# Patient Record
Sex: MALE | Race: WHITE | Employment: OTHER | URBAN - METROPOLITAN AREA
[De-identification: names, ages, dates, MRNs, and addresses within clinical notes are randomized per-mention and may not be internally consistent; named-entity substitution may affect disease eponyms.]

---

## 2018-11-14 ENCOUNTER — IP HISTORICAL/CONVERTED ENCOUNTER (OUTPATIENT)
Dept: OTHER | Age: 73
End: 2018-11-14

## 2020-08-19 ENCOUNTER — OP HISTORICAL/CONVERTED ENCOUNTER (OUTPATIENT)
Dept: OTHER | Age: 75
End: 2020-08-19

## 2020-10-06 ENCOUNTER — HOSPITAL ENCOUNTER (OUTPATIENT)
Dept: PREADMISSION TESTING | Age: 75
Discharge: HOME | End: 2020-10-06
Payer: MEDICARE

## 2020-10-06 ENCOUNTER — HOSPITAL ENCOUNTER (OUTPATIENT)
Dept: GENERAL RADIOLOGY | Age: 75
Discharge: HOME | End: 2020-10-06
Attending: ORTHOPAEDIC SURGERY
Payer: MEDICARE

## 2020-10-06 VITALS
HEART RATE: 78 BPM | BODY MASS INDEX: 39.71 KG/M2 | SYSTOLIC BLOOD PRESSURE: 182 MMHG | HEIGHT: 70 IN | DIASTOLIC BLOOD PRESSURE: 79 MMHG | WEIGHT: 277.4 LBS | OXYGEN SATURATION: 97 % | TEMPERATURE: 97.8 F | RESPIRATION RATE: 18 BRPM

## 2020-10-06 LAB
ABO + RH BLD: NORMAL
ANION GAP SERPL CALC-SCNC: 6 MMOL/L (ref 5–15)
APPEARANCE UR: CLEAR
APTT PPP: 29.5 SEC (ref 23–35.7)
BACTERIA SPEC CULT: NORMAL
BILIRUB UR QL: NEGATIVE
BLOOD GROUP ANTIBODIES SERPL: NEGATIVE
BUN SERPL-MCNC: 26 MG/DL (ref 6–20)
BUN/CREAT SERPL: 18 (ref 12–20)
CA-I BLD-MCNC: 9.1 MG/DL (ref 8.5–10.1)
CHLORIDE SERPL-SCNC: 108 MMOL/L (ref 97–108)
CO2 SERPL-SCNC: 28 MMOL/L (ref 21–32)
COLOR UR: NORMAL
CREAT SERPL-MCNC: 1.47 MG/DL (ref 0.7–1.3)
ERYTHROCYTE [DISTWIDTH] IN BLOOD BY AUTOMATED COUNT: 14 % (ref 11.5–14.5)
GLUCOSE SERPL-MCNC: 128 MG/DL (ref 65–100)
GLUCOSE UR STRIP.AUTO-MCNC: NEGATIVE MG/DL
HCT VFR BLD AUTO: 51.1 % (ref 36.6–50.3)
HGB BLD-MCNC: 17.8 G/DL (ref 12.1–17)
HGB UR QL STRIP: NEGATIVE
INR PPP: 1 (ref 0.9–1.1)
KETONES UR QL STRIP.AUTO: NEGATIVE MG/DL
LEUKOCYTE ESTERASE UR QL STRIP.AUTO: NEGATIVE
MCH RBC QN AUTO: 32 PG (ref 26–34)
MCHC RBC AUTO-ENTMCNC: 34.8 G/DL (ref 30–36.5)
MCV RBC AUTO: 91.9 FL (ref 80–99)
NITRITE UR QL STRIP.AUTO: NEGATIVE
PH UR STRIP: 5 [PH] (ref 5–8)
PLATELET # BLD AUTO: 181 K/UL (ref 150–400)
PMV BLD AUTO: 11.8 FL (ref 8.9–12.9)
POTASSIUM SERPL-SCNC: 3.4 MMOL/L (ref 3.5–5.1)
PROT UR STRIP-MCNC: NEGATIVE MG/DL
PROTHROMBIN TIME: 13.7 SEC (ref 11.9–14.7)
RBC # BLD AUTO: 5.56 M/UL (ref 4.1–5.7)
SODIUM SERPL-SCNC: 142 MMOL/L (ref 136–145)
SP GR UR REFRACTOMETRY: 1.02 (ref 1–1.03)
SPECIAL REQUESTS,SREQ: NORMAL
SPECIMEN EXP DATE BLD: NORMAL
THERAPEUTIC RANGE,PTTT: NORMAL SEC (ref 68–109)
UROBILINOGEN UR QL STRIP.AUTO: 0.1 EU/DL (ref 0.1–1)
WBC # BLD AUTO: 9.9 K/UL (ref 4.1–11.1)

## 2020-10-06 PROCEDURE — 87086 URINE CULTURE/COLONY COUNT: CPT

## 2020-10-06 PROCEDURE — 85610 PROTHROMBIN TIME: CPT

## 2020-10-06 PROCEDURE — 80048 BASIC METABOLIC PNL TOTAL CA: CPT

## 2020-10-06 PROCEDURE — 81003 URINALYSIS AUTO W/O SCOPE: CPT

## 2020-10-06 PROCEDURE — 71046 X-RAY EXAM CHEST 2 VIEWS: CPT

## 2020-10-06 PROCEDURE — 36415 COLL VENOUS BLD VENIPUNCTURE: CPT

## 2020-10-06 PROCEDURE — 85730 THROMBOPLASTIN TIME PARTIAL: CPT

## 2020-10-06 PROCEDURE — 86900 BLOOD TYPING SEROLOGIC ABO: CPT

## 2020-10-06 PROCEDURE — 85027 COMPLETE CBC AUTOMATED: CPT

## 2020-10-06 RX ORDER — PANTOPRAZOLE SODIUM 40 MG/1
40 TABLET, DELAYED RELEASE ORAL DAILY
COMMUNITY

## 2020-10-06 RX ORDER — ASPIRIN 81 MG/1
81 TABLET ORAL DAILY
COMMUNITY

## 2020-10-06 NOTE — PROGRESS NOTES
Pt states his wife recently, within the last 5 days, tested positive for [place]. As soon as they found out the Pt went to stay with his son, and had a negative COVID test on 10/4/20. He is to be swabbed again on 10/9/20. Dr. Prasanna Dominguez is aware of the situation and wants to proceed with surgery. The Pt was instructed that his wife may not come to the hospital at any point during his stay to visit him due to her recent illness. The Pt confirms understanding. Dr. Prasanna Dominguez is going to postpone the surgery by one week to allow the Pt to pass the 14 day incubation period. Dr. Yanira Adamson office is to follow up and reschedule with the Pt.

## 2020-10-07 LAB
BACTERIA SPEC CULT: NORMAL
SPECIAL REQUESTS,SREQ: NORMAL

## 2020-10-07 NOTE — PROGRESS NOTES
Dr. Judithann Spatz aware of abnormal labs as follows- BUN 26, Crt 1.47, GFR 47. No new orders at this time. Dr. Judithann Spatz advised to have Pt call his PCP to check in with them. Dr. Judithann Spatz states he does NOT need medical clearance. Pt advised and verbalizes understanding to call PCP.

## 2020-10-09 ENCOUNTER — HOSPITAL ENCOUNTER (OUTPATIENT)
Dept: PREADMISSION TESTING | Age: 75
Discharge: HOME | End: 2020-10-09

## 2020-10-23 ENCOUNTER — HOSPITAL ENCOUNTER (OUTPATIENT)
Dept: PREADMISSION TESTING | Age: 75
Discharge: HOME | End: 2020-10-23
Payer: MEDICARE

## 2020-10-23 LAB — SARS-COV-2, COV2: NORMAL

## 2020-10-23 PROCEDURE — 87635 SARS-COV-2 COVID-19 AMP PRB: CPT

## 2020-10-25 LAB — SARS-COV-2, COV2NT: NOT DETECTED

## 2020-10-26 RX ORDER — CEFAZOLIN SODIUM 2 G/100ML
2 INJECTION, SOLUTION INTRAVENOUS ONCE
Status: CANCELLED | OUTPATIENT
Start: 2020-10-27 | End: 2020-10-27

## 2020-10-27 ENCOUNTER — HOSPITAL ENCOUNTER (INPATIENT)
Age: 75
LOS: 5 days | Discharge: HOME HEALTH | DRG: 519 | End: 2020-11-01
Attending: ORTHOPAEDIC SURGERY | Admitting: ORTHOPAEDIC SURGERY
Payer: MEDICARE

## 2020-10-27 ENCOUNTER — APPOINTMENT (OUTPATIENT)
Dept: GENERAL RADIOLOGY | Age: 75
DRG: 519 | End: 2020-10-27
Attending: ORTHOPAEDIC SURGERY
Payer: MEDICARE

## 2020-10-27 ENCOUNTER — ANESTHESIA EVENT (OUTPATIENT)
Dept: SURGERY | Age: 75
DRG: 519 | End: 2020-10-27
Payer: MEDICARE

## 2020-10-27 ENCOUNTER — ANESTHESIA (OUTPATIENT)
Dept: SURGERY | Age: 75
DRG: 519 | End: 2020-10-27
Payer: MEDICARE

## 2020-10-27 PROBLEM — M48.062 LUMBAR STENOSIS WITH NEUROGENIC CLAUDICATION: Status: ACTIVE | Noted: 2020-10-27

## 2020-10-27 LAB
ANION GAP SERPL CALC-SCNC: 3 MMOL/L (ref 5–15)
BASOPHILS # BLD: 0 K/UL (ref 0–0.1)
BASOPHILS NFR BLD: 0 % (ref 0–1)
BUN SERPL-MCNC: 22 MG/DL (ref 6–20)
BUN/CREAT SERPL: 15 (ref 12–20)
CA-I BLD-MCNC: 8.9 MG/DL (ref 8.5–10.1)
CHLORIDE SERPL-SCNC: 107 MMOL/L (ref 97–108)
CO2 SERPL-SCNC: 30 MMOL/L (ref 21–32)
CREAT SERPL-MCNC: 1.45 MG/DL (ref 0.7–1.3)
DIFFERENTIAL METHOD BLD: ABNORMAL
EOSINOPHIL # BLD: 0 K/UL (ref 0–0.4)
EOSINOPHIL NFR BLD: 0 % (ref 0–7)
ERYTHROCYTE [DISTWIDTH] IN BLOOD BY AUTOMATED COUNT: 14 % (ref 11.5–14.5)
GLUCOSE BLD STRIP.AUTO-MCNC: 116 MG/DL (ref 65–100)
GLUCOSE BLD STRIP.AUTO-MCNC: 148 MG/DL (ref 65–100)
GLUCOSE SERPL-MCNC: 147 MG/DL (ref 65–100)
HCT VFR BLD AUTO: 48.6 % (ref 36.6–50.3)
HGB BLD-MCNC: 16.8 G/DL (ref 12.1–17)
IMM GRANULOCYTES # BLD AUTO: 0 K/UL (ref 0–0.04)
IMM GRANULOCYTES NFR BLD AUTO: 0 % (ref 0–0.5)
LYMPHOCYTES # BLD: 3.9 K/UL (ref 0.8–3.5)
LYMPHOCYTES NFR BLD: 26 % (ref 12–49)
MCH RBC QN AUTO: 32.2 PG (ref 26–34)
MCHC RBC AUTO-ENTMCNC: 34.6 G/DL (ref 30–36.5)
MCV RBC AUTO: 93.1 FL (ref 80–99)
MONOCYTES # BLD: 0.6 K/UL (ref 0–1)
MONOCYTES NFR BLD: 4 % (ref 5–13)
NEUTS SEG # BLD: 10.4 K/UL (ref 1.8–8)
NEUTS SEG NFR BLD: 70 % (ref 32–75)
PERFORMED BY, TECHID: ABNORMAL
PERFORMED BY, TECHID: ABNORMAL
PLATELET # BLD AUTO: 170 K/UL (ref 150–400)
PMV BLD AUTO: 11.2 FL (ref 8.9–12.9)
POTASSIUM SERPL-SCNC: 3.7 MMOL/L (ref 3.5–5.1)
POTASSIUM SERPL-SCNC: 3.9 MMOL/L (ref 3.5–5.1)
RBC # BLD AUTO: 5.22 M/UL (ref 4.1–5.7)
SODIUM SERPL-SCNC: 140 MMOL/L (ref 136–145)
WBC # BLD AUTO: 14.8 K/UL (ref 4.1–11.1)

## 2020-10-27 PROCEDURE — 85025 COMPLETE CBC W/AUTO DIFF WBC: CPT

## 2020-10-27 PROCEDURE — 36415 COLL VENOUS BLD VENIPUNCTURE: CPT

## 2020-10-27 PROCEDURE — 76010000173 HC OR TIME 3 TO 3.5 HR INTENSV-TIER 1: Performed by: ORTHOPAEDIC SURGERY

## 2020-10-27 PROCEDURE — 74011000250 HC RX REV CODE- 250: Performed by: NURSE ANESTHETIST, CERTIFIED REGISTERED

## 2020-10-27 PROCEDURE — 77030002986 HC SUT PROL J&J -A: Performed by: ORTHOPAEDIC SURGERY

## 2020-10-27 PROCEDURE — 77030013951 HC SEAL TISS ADH DURASL KT INLC -G1: Performed by: ORTHOPAEDIC SURGERY

## 2020-10-27 PROCEDURE — 76210000006 HC OR PH I REC 0.5 TO 1 HR: Performed by: ORTHOPAEDIC SURGERY

## 2020-10-27 PROCEDURE — 87086 URINE CULTURE/COLONY COUNT: CPT

## 2020-10-27 PROCEDURE — 2709999900 HC NON-CHARGEABLE SUPPLY: Performed by: ORTHOPAEDIC SURGERY

## 2020-10-27 PROCEDURE — 77030018673: Performed by: ORTHOPAEDIC SURGERY

## 2020-10-27 PROCEDURE — 77030012035 HC APPL SEAL MICROMYST KT INLC -C: Performed by: ORTHOPAEDIC SURGERY

## 2020-10-27 PROCEDURE — 00NY0ZZ RELEASE LUMBAR SPINAL CORD, OPEN APPROACH: ICD-10-PCS | Performed by: ORTHOPAEDIC SURGERY

## 2020-10-27 PROCEDURE — 74011250636 HC RX REV CODE- 250/636

## 2020-10-27 PROCEDURE — 77030039464 HC TU IRR ENDO DISP MSNX -B: Performed by: ORTHOPAEDIC SURGERY

## 2020-10-27 PROCEDURE — 74011000258 HC RX REV CODE- 258: Performed by: ORTHOPAEDIC SURGERY

## 2020-10-27 PROCEDURE — 00QT0ZZ REPAIR SPINAL MENINGES, OPEN APPROACH: ICD-10-PCS | Performed by: ORTHOPAEDIC SURGERY

## 2020-10-27 PROCEDURE — 77030041703 HC SLV COMPR DVT ARJO -B: Performed by: ORTHOPAEDIC SURGERY

## 2020-10-27 PROCEDURE — 74011250636 HC RX REV CODE- 250/636: Performed by: NURSE ANESTHETIST, CERTIFIED REGISTERED

## 2020-10-27 PROCEDURE — 74011250636 HC RX REV CODE- 250/636: Performed by: ORTHOPAEDIC SURGERY

## 2020-10-27 PROCEDURE — 77030005515 HC CATH URETH FOL14 BARD -B: Performed by: ORTHOPAEDIC SURGERY

## 2020-10-27 PROCEDURE — 01NB0ZZ RELEASE LUMBAR NERVE, OPEN APPROACH: ICD-10-PCS | Performed by: ORTHOPAEDIC SURGERY

## 2020-10-27 PROCEDURE — 77030039461 HC BLD SURG BN MSNX -E: Performed by: ORTHOPAEDIC SURGERY

## 2020-10-27 PROCEDURE — 77030002933 HC SUT MCRYL J&J -A: Performed by: ORTHOPAEDIC SURGERY

## 2020-10-27 PROCEDURE — 0QB00ZZ EXCISION OF LUMBAR VERTEBRA, OPEN APPROACH: ICD-10-PCS | Performed by: ORTHOPAEDIC SURGERY

## 2020-10-27 PROCEDURE — 77030011265 HC ELECTRD BLD HEX COVD -A: Performed by: ORTHOPAEDIC SURGERY

## 2020-10-27 PROCEDURE — 77030003161 HC GRFT DURA MTRX INLC -E: Performed by: ORTHOPAEDIC SURGERY

## 2020-10-27 PROCEDURE — 77030028271 HC SRGFL HEMSTAT MTRX KT J&J -C: Performed by: ORTHOPAEDIC SURGERY

## 2020-10-27 PROCEDURE — 82962 GLUCOSE BLOOD TEST: CPT

## 2020-10-27 PROCEDURE — 84132 ASSAY OF SERUM POTASSIUM: CPT

## 2020-10-27 PROCEDURE — 76000 FLUOROSCOPY <1 HR PHYS/QHP: CPT

## 2020-10-27 PROCEDURE — 76060000037 HC ANESTHESIA 3 TO 3.5 HR: Performed by: ORTHOPAEDIC SURGERY

## 2020-10-27 PROCEDURE — 74011000250 HC RX REV CODE- 250: Performed by: ORTHOPAEDIC SURGERY

## 2020-10-27 PROCEDURE — 77030038156 HC CRD BPLR DISP CARF -A: Performed by: ORTHOPAEDIC SURGERY

## 2020-10-27 PROCEDURE — 65270000029 HC RM PRIVATE

## 2020-10-27 PROCEDURE — 74011250637 HC RX REV CODE- 250/637: Performed by: ORTHOPAEDIC SURGERY

## 2020-10-27 PROCEDURE — 77030031139 HC SUT VCRL2 J&J -A: Performed by: ORTHOPAEDIC SURGERY

## 2020-10-27 PROCEDURE — 77030011264 HC ELECTRD BLD EXT COVD -A: Performed by: ORTHOPAEDIC SURGERY

## 2020-10-27 DEVICE — DURAGEN® SUTURABLE DURAL REGENERATION MATRIX, 2 IN X 2 IN (5 CM X 5 CM)
Type: IMPLANTABLE DEVICE | Site: SPINE LUMBAR | Status: FUNCTIONAL
Brand: DURAGEN® SUTURABLE

## 2020-10-27 RX ORDER — PROPOFOL 10 MG/ML
INJECTION, EMULSION INTRAVENOUS AS NEEDED
Status: DISCONTINUED | OUTPATIENT
Start: 2020-10-27 | End: 2020-10-27 | Stop reason: HOSPADM

## 2020-10-27 RX ORDER — ATORVASTATIN CALCIUM 20 MG/1
20 TABLET, FILM COATED ORAL
Status: DISCONTINUED | OUTPATIENT
Start: 2020-10-27 | End: 2020-11-01 | Stop reason: HOSPADM

## 2020-10-27 RX ORDER — HYDROCODONE BITARTRATE AND ACETAMINOPHEN 5; 325 MG/1; MG/1
1 TABLET ORAL
Status: DISCONTINUED | OUTPATIENT
Start: 2020-10-27 | End: 2020-10-28

## 2020-10-27 RX ORDER — AMLODIPINE BESYLATE 5 MG/1
5 TABLET ORAL DAILY
Status: DISCONTINUED | OUTPATIENT
Start: 2020-10-28 | End: 2020-10-30

## 2020-10-27 RX ORDER — LIDOCAINE HYDROCHLORIDE 20 MG/ML
INJECTION, SOLUTION EPIDURAL; INFILTRATION; INTRACAUDAL; PERINEURAL AS NEEDED
Status: DISCONTINUED | OUTPATIENT
Start: 2020-10-27 | End: 2020-10-27 | Stop reason: HOSPADM

## 2020-10-27 RX ORDER — NALOXONE HYDROCHLORIDE 0.4 MG/ML
0.4 INJECTION, SOLUTION INTRAMUSCULAR; INTRAVENOUS; SUBCUTANEOUS AS NEEDED
Status: DISCONTINUED | OUTPATIENT
Start: 2020-10-27 | End: 2020-11-01 | Stop reason: HOSPADM

## 2020-10-27 RX ORDER — AMOXICILLIN 250 MG
1 CAPSULE ORAL 2 TIMES DAILY
Status: DISCONTINUED | OUTPATIENT
Start: 2020-10-27 | End: 2020-11-01 | Stop reason: HOSPADM

## 2020-10-27 RX ORDER — HYDROCHLOROTHIAZIDE 25 MG/1
12.5 TABLET ORAL DAILY
Status: DISCONTINUED | OUTPATIENT
Start: 2020-10-28 | End: 2020-11-01 | Stop reason: HOSPADM

## 2020-10-27 RX ORDER — ONDANSETRON 2 MG/ML
4 INJECTION INTRAMUSCULAR; INTRAVENOUS
Status: DISPENSED | OUTPATIENT
Start: 2020-10-27 | End: 2020-10-28

## 2020-10-27 RX ORDER — FENTANYL CITRATE 50 UG/ML
INJECTION, SOLUTION INTRAMUSCULAR; INTRAVENOUS AS NEEDED
Status: DISCONTINUED | OUTPATIENT
Start: 2020-10-27 | End: 2020-10-27 | Stop reason: HOSPADM

## 2020-10-27 RX ORDER — POLYETHYLENE GLYCOL 3350 17 G/17G
17 POWDER, FOR SOLUTION ORAL DAILY
Status: DISCONTINUED | OUTPATIENT
Start: 2020-10-28 | End: 2020-11-01 | Stop reason: HOSPADM

## 2020-10-27 RX ORDER — OXYCODONE AND ACETAMINOPHEN 5; 325 MG/1; MG/1
2 TABLET ORAL AS NEEDED
Status: DISCONTINUED | OUTPATIENT
Start: 2020-10-27 | End: 2020-10-27 | Stop reason: HOSPADM

## 2020-10-27 RX ORDER — GABAPENTIN 300 MG/1
300 CAPSULE ORAL 2 TIMES DAILY
Status: DISCONTINUED | OUTPATIENT
Start: 2020-10-27 | End: 2020-11-01 | Stop reason: HOSPADM

## 2020-10-27 RX ORDER — SODIUM CHLORIDE 0.9 % (FLUSH) 0.9 %
5-40 SYRINGE (ML) INJECTION AS NEEDED
Status: DISCONTINUED | OUTPATIENT
Start: 2020-10-27 | End: 2020-10-27 | Stop reason: HOSPADM

## 2020-10-27 RX ORDER — MIDAZOLAM HYDROCHLORIDE 1 MG/ML
INJECTION, SOLUTION INTRAMUSCULAR; INTRAVENOUS AS NEEDED
Status: DISCONTINUED | OUTPATIENT
Start: 2020-10-27 | End: 2020-10-27 | Stop reason: HOSPADM

## 2020-10-27 RX ORDER — SODIUM CHLORIDE, SODIUM LACTATE, POTASSIUM CHLORIDE, CALCIUM CHLORIDE 600; 310; 30; 20 MG/100ML; MG/100ML; MG/100ML; MG/100ML
20 INJECTION, SOLUTION INTRAVENOUS CONTINUOUS
Status: DISCONTINUED | OUTPATIENT
Start: 2020-10-27 | End: 2020-10-27 | Stop reason: HOSPADM

## 2020-10-27 RX ORDER — SODIUM CHLORIDE 0.9 % (FLUSH) 0.9 %
5-40 SYRINGE (ML) INJECTION AS NEEDED
Status: DISCONTINUED | OUTPATIENT
Start: 2020-10-27 | End: 2020-11-01 | Stop reason: HOSPADM

## 2020-10-27 RX ORDER — PROPRANOLOL HYDROCHLORIDE 10 MG/1
30 TABLET ORAL
Status: DISCONTINUED | OUTPATIENT
Start: 2020-10-28 | End: 2020-11-01 | Stop reason: HOSPADM

## 2020-10-27 RX ORDER — DIPHENHYDRAMINE HYDROCHLORIDE 50 MG/ML
12.5 INJECTION, SOLUTION INTRAMUSCULAR; INTRAVENOUS AS NEEDED
Status: DISCONTINUED | OUTPATIENT
Start: 2020-10-27 | End: 2020-10-27 | Stop reason: HOSPADM

## 2020-10-27 RX ORDER — ALLOPURINOL 100 MG/1
300 TABLET ORAL
Status: DISCONTINUED | OUTPATIENT
Start: 2020-10-27 | End: 2020-11-01 | Stop reason: HOSPADM

## 2020-10-27 RX ORDER — SODIUM CHLORIDE 9 MG/ML
125 INJECTION, SOLUTION INTRAVENOUS CONTINUOUS
Status: DISCONTINUED | OUTPATIENT
Start: 2020-10-27 | End: 2020-10-30

## 2020-10-27 RX ORDER — ONDANSETRON 2 MG/ML
INJECTION INTRAMUSCULAR; INTRAVENOUS AS NEEDED
Status: DISCONTINUED | OUTPATIENT
Start: 2020-10-27 | End: 2020-10-27 | Stop reason: HOSPADM

## 2020-10-27 RX ORDER — SUCCINYLCHOLINE CHLORIDE 20 MG/ML
INJECTION INTRAMUSCULAR; INTRAVENOUS AS NEEDED
Status: DISCONTINUED | OUTPATIENT
Start: 2020-10-27 | End: 2020-10-27 | Stop reason: HOSPADM

## 2020-10-27 RX ORDER — HYDROCODONE BITARTRATE AND ACETAMINOPHEN 10; 325 MG/1; MG/1
1 TABLET ORAL
Status: DISCONTINUED | OUTPATIENT
Start: 2020-10-27 | End: 2020-10-28

## 2020-10-27 RX ORDER — HYDROMORPHONE HYDROCHLORIDE 1 MG/ML
0.5 INJECTION, SOLUTION INTRAMUSCULAR; INTRAVENOUS; SUBCUTANEOUS
Status: DISCONTINUED | OUTPATIENT
Start: 2020-10-27 | End: 2020-10-27 | Stop reason: HOSPADM

## 2020-10-27 RX ORDER — PANTOPRAZOLE SODIUM 20 MG/1
40 TABLET, DELAYED RELEASE ORAL DAILY
Status: DISCONTINUED | OUTPATIENT
Start: 2020-10-27 | End: 2020-11-01 | Stop reason: HOSPADM

## 2020-10-27 RX ORDER — PANTOPRAZOLE SODIUM 40 MG/1
40 TABLET, DELAYED RELEASE ORAL DAILY
Status: DISCONTINUED | OUTPATIENT
Start: 2020-10-28 | End: 2020-10-31

## 2020-10-27 RX ORDER — FACIAL-BODY WIPES
10 EACH TOPICAL DAILY PRN
Status: DISCONTINUED | OUTPATIENT
Start: 2020-10-29 | End: 2020-10-30

## 2020-10-27 RX ORDER — LOSARTAN POTASSIUM 50 MG/1
100 TABLET ORAL DAILY
Status: DISCONTINUED | OUTPATIENT
Start: 2020-10-28 | End: 2020-10-27

## 2020-10-27 RX ORDER — DEXAMETHASONE SODIUM PHOSPHATE 4 MG/ML
INJECTION, SOLUTION INTRA-ARTICULAR; INTRALESIONAL; INTRAMUSCULAR; INTRAVENOUS; SOFT TISSUE AS NEEDED
Status: DISCONTINUED | OUTPATIENT
Start: 2020-10-27 | End: 2020-10-27 | Stop reason: HOSPADM

## 2020-10-27 RX ORDER — MELOXICAM 7.5 MG/1
15 TABLET ORAL DAILY
Status: DISCONTINUED | OUTPATIENT
Start: 2020-10-28 | End: 2020-11-01 | Stop reason: HOSPADM

## 2020-10-27 RX ORDER — SODIUM CHLORIDE 0.9 % (FLUSH) 0.9 %
5-40 SYRINGE (ML) INJECTION EVERY 8 HOURS
Status: DISCONTINUED | OUTPATIENT
Start: 2020-10-27 | End: 2020-11-01 | Stop reason: HOSPADM

## 2020-10-27 RX ORDER — MORPHINE SULFATE 2 MG/ML
2 INJECTION, SOLUTION INTRAMUSCULAR; INTRAVENOUS
Status: DISPENSED | OUTPATIENT
Start: 2020-10-27 | End: 2020-10-28

## 2020-10-27 RX ORDER — BUPIVACAINE HYDROCHLORIDE 2.5 MG/ML
INJECTION, SOLUTION EPIDURAL; INFILTRATION; INTRACAUDAL AS NEEDED
Status: DISCONTINUED | OUTPATIENT
Start: 2020-10-27 | End: 2020-10-27 | Stop reason: HOSPADM

## 2020-10-27 RX ORDER — FENOFIBRATE 145 MG/1
145 TABLET, COATED ORAL
Status: DISCONTINUED | OUTPATIENT
Start: 2020-10-27 | End: 2020-11-01 | Stop reason: HOSPADM

## 2020-10-27 RX ORDER — ROCURONIUM BROMIDE 10 MG/ML
INJECTION, SOLUTION INTRAVENOUS AS NEEDED
Status: DISCONTINUED | OUTPATIENT
Start: 2020-10-27 | End: 2020-10-27 | Stop reason: HOSPADM

## 2020-10-27 RX ORDER — FENTANYL CITRATE 50 UG/ML
25 INJECTION, SOLUTION INTRAMUSCULAR; INTRAVENOUS
Status: DISCONTINUED | OUTPATIENT
Start: 2020-10-27 | End: 2020-10-27 | Stop reason: HOSPADM

## 2020-10-27 RX ORDER — ONDANSETRON 2 MG/ML
4 INJECTION INTRAMUSCULAR; INTRAVENOUS AS NEEDED
Status: DISCONTINUED | OUTPATIENT
Start: 2020-10-27 | End: 2020-10-27 | Stop reason: HOSPADM

## 2020-10-27 RX ORDER — METFORMIN HYDROCHLORIDE 500 MG/1
500 TABLET ORAL DAILY
Status: DISCONTINUED | OUTPATIENT
Start: 2020-10-28 | End: 2020-11-01 | Stop reason: HOSPADM

## 2020-10-27 RX ORDER — ASPIRIN 81 MG/1
81 TABLET ORAL DAILY
Status: DISCONTINUED | OUTPATIENT
Start: 2020-10-27 | End: 2020-11-01 | Stop reason: HOSPADM

## 2020-10-27 RX ORDER — LOSARTAN POTASSIUM 50 MG/1
100 TABLET ORAL DAILY
Status: DISCONTINUED | OUTPATIENT
Start: 2020-10-28 | End: 2020-11-01 | Stop reason: HOSPADM

## 2020-10-27 RX ADMIN — PHENYLEPHRINE HYDROCHLORIDE 100 MCG: 10 INJECTION INTRAVENOUS at 14:26

## 2020-10-27 RX ADMIN — SENNOSIDES AND DOCUSATE SODIUM 1 TABLET: 8.6; 5 TABLET ORAL at 22:37

## 2020-10-27 RX ADMIN — ONDANSETRON 4 MG: 2 INJECTION INTRAMUSCULAR; INTRAVENOUS at 21:04

## 2020-10-27 RX ADMIN — SODIUM CHLORIDE, POTASSIUM CHLORIDE, SODIUM LACTATE AND CALCIUM CHLORIDE 20 ML/HR: 600; 310; 30; 20 INJECTION, SOLUTION INTRAVENOUS at 11:15

## 2020-10-27 RX ADMIN — PHENYLEPHRINE HYDROCHLORIDE 100 MCG: 10 INJECTION INTRAVENOUS at 15:19

## 2020-10-27 RX ADMIN — PHENYLEPHRINE HYDROCHLORIDE 200 MCG: 10 INJECTION INTRAVENOUS at 15:22

## 2020-10-27 RX ADMIN — FENOFIBRATE 145 MG: 145 TABLET, FILM COATED ORAL at 22:37

## 2020-10-27 RX ADMIN — ROCURONIUM BROMIDE 10 MG: 10 SOLUTION INTRAVENOUS at 14:30

## 2020-10-27 RX ADMIN — ROCURONIUM BROMIDE 10 MG: 10 SOLUTION INTRAVENOUS at 14:45

## 2020-10-27 RX ADMIN — SUGAMMADEX 400 MG: 100 INJECTION, SOLUTION INTRAVENOUS at 15:54

## 2020-10-27 RX ADMIN — ONDANSETRON 4 MG: 2 INJECTION INTRAMUSCULAR; INTRAVENOUS at 15:45

## 2020-10-27 RX ADMIN — MIDAZOLAM HYDROCHLORIDE 2 MG: 2 INJECTION, SOLUTION INTRAMUSCULAR; INTRAVENOUS at 13:01

## 2020-10-27 RX ADMIN — FENTANYL CITRATE 50 MCG: 50 INJECTION, SOLUTION INTRAMUSCULAR; INTRAVENOUS at 16:08

## 2020-10-27 RX ADMIN — PROPOFOL 200 MG: 10 INJECTION, EMULSION INTRAVENOUS at 13:06

## 2020-10-27 RX ADMIN — SODIUM CHLORIDE 125 ML/HR: 9 INJECTION, SOLUTION INTRAVENOUS at 18:06

## 2020-10-27 RX ADMIN — ONDANSETRON 4 MG: 2 INJECTION INTRAMUSCULAR; INTRAVENOUS at 13:36

## 2020-10-27 RX ADMIN — LIDOCAINE HYDROCHLORIDE 40 MG: 20 INJECTION, SOLUTION EPIDURAL; INFILTRATION; INTRACAUDAL; PERINEURAL at 13:05

## 2020-10-27 RX ADMIN — FENTANYL CITRATE 50 MCG: 50 INJECTION, SOLUTION INTRAMUSCULAR; INTRAVENOUS at 16:18

## 2020-10-27 RX ADMIN — FENTANYL CITRATE 100 MCG: 50 INJECTION, SOLUTION INTRAMUSCULAR; INTRAVENOUS at 13:05

## 2020-10-27 RX ADMIN — SODIUM CHLORIDE, POTASSIUM CHLORIDE, SODIUM LACTATE AND CALCIUM CHLORIDE: 600; 310; 30; 20 INJECTION, SOLUTION INTRAVENOUS at 14:51

## 2020-10-27 RX ADMIN — DEXAMETHASONE SODIUM PHOSPHATE 4 MG: 4 INJECTION, SOLUTION INTRA-ARTICULAR; INTRALESIONAL; INTRAMUSCULAR; INTRAVENOUS; SOFT TISSUE at 13:36

## 2020-10-27 RX ADMIN — CEFAZOLIN 3 G: 10 INJECTION, POWDER, FOR SOLUTION INTRAVENOUS at 13:37

## 2020-10-27 RX ADMIN — ALLOPURINOL 300 MG: 300 TABLET ORAL at 22:37

## 2020-10-27 RX ADMIN — GABAPENTIN 300 MG: 300 CAPSULE ORAL at 21:05

## 2020-10-27 RX ADMIN — SODIUM CHLORIDE, POTASSIUM CHLORIDE, SODIUM LACTATE AND CALCIUM CHLORIDE: 600; 310; 30; 20 INJECTION, SOLUTION INTRAVENOUS at 13:01

## 2020-10-27 RX ADMIN — ONDANSETRON 4 MG: 2 INJECTION INTRAMUSCULAR; INTRAVENOUS at 17:25

## 2020-10-27 RX ADMIN — ATORVASTATIN CALCIUM 20 MG: 20 TABLET, FILM COATED ORAL at 22:37

## 2020-10-27 RX ADMIN — ROCURONIUM BROMIDE 10 MG: 10 SOLUTION INTRAVENOUS at 14:15

## 2020-10-27 RX ADMIN — PHENYLEPHRINE HYDROCHLORIDE 100 MCG: 10 INJECTION INTRAVENOUS at 14:31

## 2020-10-27 RX ADMIN — SUCCINYLCHOLINE CHLORIDE 120 MG: 20 INJECTION, SOLUTION INTRAMUSCULAR; INTRAVENOUS at 13:07

## 2020-10-27 RX ADMIN — HYDROCODONE BITARTRATE AND ACETAMINOPHEN 1 TABLET: 10; 325 TABLET ORAL at 21:05

## 2020-10-27 RX ADMIN — MORPHINE SULFATE 2 MG: 2 INJECTION, SOLUTION INTRAMUSCULAR; INTRAVENOUS at 18:06

## 2020-10-27 RX ADMIN — PHENYLEPHRINE HYDROCHLORIDE 100 MCG: 10 INJECTION INTRAVENOUS at 14:44

## 2020-10-27 RX ADMIN — PHENYLEPHRINE HYDROCHLORIDE 100 MCG: 10 INJECTION INTRAVENOUS at 15:18

## 2020-10-27 RX ADMIN — ROCURONIUM BROMIDE 50 MG: 10 SOLUTION INTRAVENOUS at 13:16

## 2020-10-27 NOTE — ROUTINE PROCESS
Pt arrived to floor from PACU at 1705. Vitals stable. Pt had emesis x1, zofran given. Will continue to monitor.

## 2020-10-27 NOTE — PROGRESS NOTES
[facility] Post surgical skin assessment completed with Morales Sibley RN. Pt skin dry/intact with exception of dressing on lower back w/ shadowing noted which was outlined.

## 2020-10-27 NOTE — ANESTHESIA PREPROCEDURE EVALUATION
Relevant Problems   No relevant active problems       Anesthetic History   No history of anesthetic complications            Review of Systems / Medical History  Patient summary reviewed, nursing notes reviewed and pertinent labs reviewed    Pulmonary  Within defined limits                 Neuro/Psych             Comments: Lumbar Spinal Stenosis with Neurogenic Claudication Cardiovascular    Hypertension          Hyperlipidemia         GI/Hepatic/Renal     GERD    Renal disease: CRI       Endo/Other    Diabetes: type 2    Morbid obesity and arthritis (Osteoarthritis & Gout)     Other Findings   Comments: Allergies  No Known Allergies  Ht: 5' 10.08\" (178 cm)  Weight: 127 kg (280 lb)  BMI: 40.09 kg/m²  CrCl:   58.2 mL/min (A)    Procedure  L2 - L3 & L4 - L5 Laminectomy, L4 - L5 Transforaminal Interbody Fusion Pedicle Screw Instrumentation      Medical History  Hypertension  Diabetes (HCC)  GERD (gastroesophageal reflux disease)  Arthritis  Gout  Hyperlipidemia  Cancer (HCC)  Ill-defined condition         Physical Exam    Airway             Cardiovascular               Dental         Pulmonary                 Abdominal         Other Findings   Comments: Results for Jessica Collins (MRN [id number]) as of 10/27/2020 11:59    10/6/2020 10:10  WBC: 9.9  RBC: 5.56  HGB: 17.8 (H)  HCT: 51.1 (H)  MCV: 91.9  MCH: 32.0  MCHC: 34.8  RDW: 14.0  PLATELET: 455  MPV: 23.6    Results for Jsesica Collins (MRN [id number]) as of 10/27/2020 11:59    10/6/2020 10:10  INR: 1.0  Prothrombin time: 13.7  aPTT: 29.5    Results for Jessica Collins (MRN [id number]) as of 10/27/2020 11:59    10/6/2020 10:10  Sodium: 142  Potassium: 3.4 (L)  Chloride: 108  CO2: 28  Anion gap: 6  Glucose: 128 (H)  BUN: 26 (H)  Creatinine: 1.47 (H)  BUN/Creatinine ratio: 18  Calcium: 9.1  GFR est non-AA: 47 (L)  GFR est AA: 57 (L)           Anesthetic Plan    ASA: 3  Anesthesia type: general          Induction: Intravenous  Anesthetic plan and risks discussed with: Patient

## 2020-10-27 NOTE — OP NOTES
Indication for Surgery  The patient presented with L4-5 spondylolisthesis and L2-3 as well as L4-5 stenosis with significant neurogenic claudication. He failed conservative treatment for this condition including physical therapy, medical management of pain, activity modifications and epidural steroid injections. I recommended decompression of the L2-3 and L4-5 levels, with fusion at the L4-5 level including TLIF to address the listhesis at L4-5. The patient was explained the benefits and risks of surgical intervention in detail and consented freely to the procedure. Preoperative Diagnosis   L2-3, L4-5 stenosis with neurogenic claudication   L4-5 spondylolisthesis    Postoperative Diagnosis   L2-3, L4-5 stenosis with neurogenic claudication   L4-5 spondylolisthesis   Adherent dura at multiple locations with multiple complex dural defects    Operation   L2-3 and L4-5 laminectomies   Patch repair of durotomies at both levels    Surgeon(s)  Myriam Keita MD    Assistant  Iván Gonzalez PA-C    Anesthesia  General endotracheal    Estimated Blood Loss  400 cc    Specimen(s)  None    Complications  Multiple CSF leaks due to dural adhesions and attenuation    Implants  Duragen patch in two pieces and Duraseal for coverage of defects    Drains  Nones    Technique  The patient was identified in the holding area and the operative site was marked. Consent was reviewed with the patient. The patient was taken to the OR and placed in the prone position on the Jhony table after induction of anesthesia and intubation. Her back was prepped and draped in the usual sterile fashion. The appropriate time out procedure was called and carried out. The patient received preoperative Ancef 2 g IV within 1 hour before incision. The midline incision was centered at the 24-5 interlaminar spaces and taken through skin and subcutaneous tissue. The spinous processes, laminae and facet capsules of L2-5 were exposed bilaterally. The level was confirmed with lateral fluoroscopy with a [place] elevator at the L3-4 interlaminar space. Deep retractors were then applied. Decompression was approached first at the L2-3 level. The midline structures were removed with a [place] rongeur and the lamina was divided bilaterally as well as in the inferior half of the lamina of L2 transversely using the Misonix ultrasonic bone scalpel. Partial facetectomies were also initiated by dividing the inferomedial aspects of the L2 inferior tubular processes with the Misonix bone scalpel. After removing the midline structures the ligamentum flavum was carefully teased away from the right side and removed with a 4 mm Kerrison punch. During dissection of the ligamentum flavum off the dura on the left side it was evident that there was a large defect and arachnoid mater was immediately visible. Despite extending the laminectomy cranially and caudally there was no clear tissue consistent with dura until just distal to the superior margin of the L3 lamina. Using micro curettage I attempted to identify the dural reflection but was not able to do so even despite careful dissection off the bone. An area of 1 cm x 3 cm dural defect was noted. Initially, this area had arachnoid matter that was maintaining turgor. Slowly this leaked as decompression progressed. After decompression was completed bilaterally at L2-3 a large cottonoid was applied to cover this area. Attempts to identify the dural edge of the suspected tear were unsuccessful indicating that the dura had been adherent to or confluent with the ligamentum flavum. However, adequate decompression was felt to be achieved at L2-3. Attention was then turned to the L4-5 level where again the Misonix bone scalpel was used to create the laminotomy.   After removal of the laminotomy bone and during right-sided ligamentum flavum removal another dural defect was encountered indicating that the finding at L2-3 was not isolated. At this point the decision was made not to further damage his dura with fusion. I was not able to note any motion at the L4-5 level so no further decompression was carried out. Two pieces of DuraGen were fashioned to the appropriate shape and size for the 2 laminectomy sites and layered on the thecal sac and roots. Each location was then sealed with DuraSeal.  Valsalva maneuver showed no active CSF leakage at this point. Hemostasis was established and then the wound was closed. Closure was accomplished with #1 Vicryl for the fascia first using figure-of-eight interrupted stitches then a layer of running locking #1 Vicryl stitch. The subcutaneous tissue was closed with 2-0 Vicryl. The skin was then closed with 3-0 Monocryl subcuticular stitch. Steri-Strips and a sterile dressing were applied. My PA assisted with patient positioning, prepping, draping, root retraction and cleaning and passing of instruments. He accomplished the irrigation and closure of the wound independently after the fascia was closed by me. The patient was then turned to the supine position and extubated without incident. He was transported to recovery room in stable condition.

## 2020-10-27 NOTE — ANESTHESIA POSTPROCEDURE EVALUATION
Procedure(s):  L2 - L3 & L4 - L5 Laminectomy, L4 - L5 Transforaminal  Interbody Fusion Pedicle Screw Instrumentation.     general    Anesthesia Post Evaluation      Multimodal analgesia: multimodal analgesia used between 6 hours prior to anesthesia start to PACU discharge  Patient location during evaluation: PACU  Patient participation: complete - patient participated  Level of consciousness: awake  Pain score: 0  Pain management: adequate  Airway patency: patent  Anesthetic complications: no  Cardiovascular status: acceptable  Respiratory status: acceptable  Hydration status: acceptable  Post anesthesia nausea and vomiting:  controlled  Final Post Anesthesia Temperature Assessment:  Normothermia (36.0-37.5 degrees C)      INITIAL Post-op Vital signs:   Vitals Value Taken Time   /68 10/27/2020  4:45 PM   Temp 36.2 °C (97.1 °F) 10/27/2020  4:45 PM   Pulse 71 10/27/2020  4:45 PM   Resp 14 10/27/2020  4:45 PM   SpO2 95 % 10/27/2020  4:45 PM

## 2020-10-27 NOTE — PERIOP NOTES
TRANSFER - OUT REPORT:    Verbal report given to [facility], RN on Johanna Joseph  being transferred to  for routine post - op       Report consisted of patients Situation, Background, Assessment and   Recommendations(SBAR). Information from the following report(s) SBAR, Procedure Summary, Intake/Output, MAR and Dual Neuro Assessment was reviewed with the receiving nurse. Opportunity for questions and clarification was provided.       Patient transported with:   Registered Nurse

## 2020-10-27 NOTE — ROUTINE PROCESS
Bedside shift change report given to Jenna Hoyos (oncoming nurse) by Mirna Fleischer, RN (offgoing nurse). Report included the following information SBAR.

## 2020-10-28 LAB
ANION GAP SERPL CALC-SCNC: 4 MMOL/L (ref 5–15)
BASOPHILS # BLD: 0 K/UL (ref 0–0.1)
BASOPHILS NFR BLD: 0 % (ref 0–1)
BUN SERPL-MCNC: 22 MG/DL (ref 6–20)
BUN/CREAT SERPL: 17 (ref 12–20)
CA-I BLD-MCNC: 8.4 MG/DL (ref 8.5–10.1)
CHLORIDE SERPL-SCNC: 108 MMOL/L (ref 97–108)
CO2 SERPL-SCNC: 28 MMOL/L (ref 21–32)
CREAT SERPL-MCNC: 1.27 MG/DL (ref 0.7–1.3)
DIFFERENTIAL METHOD BLD: ABNORMAL
EOSINOPHIL # BLD: 0 K/UL (ref 0–0.4)
EOSINOPHIL NFR BLD: 0 % (ref 0–7)
ERYTHROCYTE [DISTWIDTH] IN BLOOD BY AUTOMATED COUNT: 14.2 % (ref 11.5–14.5)
GLUCOSE SERPL-MCNC: 134 MG/DL (ref 65–100)
HCT VFR BLD AUTO: 45.5 % (ref 36.6–50.3)
HGB BLD-MCNC: 15.3 G/DL (ref 12.1–17)
IMM GRANULOCYTES # BLD AUTO: 0.1 K/UL (ref 0–0.04)
IMM GRANULOCYTES NFR BLD AUTO: 0 % (ref 0–0.5)
LYMPHOCYTES # BLD: 4.3 K/UL (ref 0.8–3.5)
LYMPHOCYTES NFR BLD: 29 % (ref 12–49)
MCH RBC QN AUTO: 31.2 PG (ref 26–34)
MCHC RBC AUTO-ENTMCNC: 33.6 G/DL (ref 30–36.5)
MCV RBC AUTO: 92.9 FL (ref 80–99)
MONOCYTES # BLD: 1.3 K/UL (ref 0–1)
MONOCYTES NFR BLD: 8 % (ref 5–13)
NEUTS SEG # BLD: 9.5 K/UL (ref 1.8–8)
NEUTS SEG NFR BLD: 63 % (ref 32–75)
NRBC # BLD: 0.1 K/UL (ref 0–0.01)
NRBC BLD-RTO: 0.6 PER 100 WBC
PLATELET # BLD AUTO: 175 K/UL (ref 150–400)
PMV BLD AUTO: 11.2 FL (ref 8.9–12.9)
POTASSIUM SERPL-SCNC: 3.8 MMOL/L (ref 3.5–5.1)
RBC # BLD AUTO: 4.9 M/UL (ref 4.1–5.7)
SODIUM SERPL-SCNC: 140 MMOL/L (ref 136–145)
WBC # BLD AUTO: 15.1 K/UL (ref 4.1–11.1)

## 2020-10-28 PROCEDURE — 74011250637 HC RX REV CODE- 250/637: Performed by: ORTHOPAEDIC SURGERY

## 2020-10-28 PROCEDURE — 74011250636 HC RX REV CODE- 250/636: Performed by: ORTHOPAEDIC SURGERY

## 2020-10-28 PROCEDURE — 74011250637 HC RX REV CODE- 250/637: Performed by: PHYSICIAN ASSISTANT

## 2020-10-28 PROCEDURE — 65270000029 HC RM PRIVATE

## 2020-10-28 PROCEDURE — 36415 COLL VENOUS BLD VENIPUNCTURE: CPT

## 2020-10-28 PROCEDURE — 80048 BASIC METABOLIC PNL TOTAL CA: CPT

## 2020-10-28 PROCEDURE — 85025 COMPLETE CBC W/AUTO DIFF WBC: CPT

## 2020-10-28 RX ORDER — ACETAMINOPHEN 325 MG/1
650 TABLET ORAL
Status: DISCONTINUED | OUTPATIENT
Start: 2020-10-28 | End: 2020-11-01 | Stop reason: HOSPADM

## 2020-10-28 RX ORDER — OXYCODONE HYDROCHLORIDE 10 MG/1
10 TABLET ORAL
Status: DISCONTINUED | OUTPATIENT
Start: 2020-10-28 | End: 2020-11-01 | Stop reason: HOSPADM

## 2020-10-28 RX ORDER — OXYCODONE HYDROCHLORIDE 5 MG/1
5 TABLET ORAL
Status: DISCONTINUED | OUTPATIENT
Start: 2020-10-28 | End: 2020-11-01 | Stop reason: HOSPADM

## 2020-10-28 RX ORDER — CYCLOBENZAPRINE HCL 10 MG
10 TABLET ORAL
Status: DISCONTINUED | OUTPATIENT
Start: 2020-10-28 | End: 2020-11-01 | Stop reason: HOSPADM

## 2020-10-28 RX ADMIN — GABAPENTIN 300 MG: 300 CAPSULE ORAL at 10:12

## 2020-10-28 RX ADMIN — Medication 10 ML: at 13:22

## 2020-10-28 RX ADMIN — SODIUM CHLORIDE 125 ML/HR: 9 INJECTION, SOLUTION INTRAVENOUS at 01:12

## 2020-10-28 RX ADMIN — ACETAMINOPHEN 650 MG: 325 TABLET, FILM COATED ORAL at 23:32

## 2020-10-28 RX ADMIN — FENOFIBRATE 145 MG: 145 TABLET, FILM COATED ORAL at 21:58

## 2020-10-28 RX ADMIN — ONDANSETRON 4 MG: 2 INJECTION INTRAMUSCULAR; INTRAVENOUS at 03:44

## 2020-10-28 RX ADMIN — ASPIRIN 81 MG: 81 TABLET, COATED ORAL at 10:12

## 2020-10-28 RX ADMIN — MORPHINE SULFATE 2 MG: 2 INJECTION, SOLUTION INTRAMUSCULAR; INTRAVENOUS at 03:44

## 2020-10-28 RX ADMIN — HYDROCODONE BITARTRATE AND ACETAMINOPHEN 1 TABLET: 5; 325 TABLET ORAL at 01:10

## 2020-10-28 RX ADMIN — PANTOPRAZOLE SODIUM 40 MG: 20 TABLET, DELAYED RELEASE ORAL at 10:12

## 2020-10-28 RX ADMIN — GABAPENTIN 300 MG: 300 CAPSULE ORAL at 20:23

## 2020-10-28 RX ADMIN — ACETAMINOPHEN 650 MG: 325 TABLET, FILM COATED ORAL at 10:21

## 2020-10-28 RX ADMIN — CYCLOBENZAPRINE 10 MG: 10 TABLET, FILM COATED ORAL at 10:21

## 2020-10-28 RX ADMIN — OXYCODONE HYDROCHLORIDE 10 MG: 10 TABLET ORAL at 23:32

## 2020-10-28 RX ADMIN — OXYCODONE HYDROCHLORIDE 10 MG: 10 TABLET ORAL at 13:22

## 2020-10-28 RX ADMIN — HYDROCODONE BITARTRATE AND ACETAMINOPHEN 1 TABLET: 10; 325 TABLET ORAL at 06:28

## 2020-10-28 RX ADMIN — ATORVASTATIN CALCIUM 20 MG: 20 TABLET, FILM COATED ORAL at 21:58

## 2020-10-28 RX ADMIN — OXYCODONE HYDROCHLORIDE 10 MG: 10 TABLET ORAL at 18:59

## 2020-10-28 RX ADMIN — CYCLOBENZAPRINE 10 MG: 10 TABLET, FILM COATED ORAL at 15:58

## 2020-10-28 RX ADMIN — CYCLOBENZAPRINE 10 MG: 10 TABLET, FILM COATED ORAL at 21:58

## 2020-10-28 RX ADMIN — ALLOPURINOL 300 MG: 300 TABLET ORAL at 20:23

## 2020-10-28 RX ADMIN — METFORMIN HYDROCHLORIDE 500 MG: 500 TABLET ORAL at 10:12

## 2020-10-28 NOTE — PROGRESS NOTES
PT consult received. Pt currently on bedrest until 10/29. This PT spoke with ortho PA to confirm plan to hold PT today and will see pt tomorrow once he is able to be more mobile. Thank you.

## 2020-10-28 NOTE — PROGRESS NOTES
NAME:     Osiel Lott   :       1945   MRN:       [id number]   DATE:      10/28/2020    POD:    1 Day Post-Op  S/P:    Procedure(s):  L2 - L3 & L4 - L5 Laminectomy, Dural tear    SUBJECTIVE:    Patient doing well. Low back pain moderate. Leg symptoms improving. Recent Labs     10/28/20  0530   HGB 15.3   HCT 45.5      K 3.8      CO2 28   BUN 22*   CREA 1.27   *     Patient Vitals for the past 12 hrs:   BP Temp Pulse Resp SpO2   10/28/20 0703 (!) 133/58 99 °F (37.2 °C) 92 18 90 %   10/28/20 0628 (!) 135/55 99.4 °F (37.4 °C) 90 -- 91 %   10/28/20 0300 (!) 134/55 98.2 °F (36.8 °C) 82 18 92 %   10/28/20 0108 111/61 99.6 °F (37.6 °C) 97 18 93 %   10/27/20 2310 (!) 114/52 99.9 °F (37.7 °C) 94 16 94 %       EXAM:  No motor or sensory deficits noted. Dressing clean, dry, intact. Incision without erythema, induration, fluctuance. Calves NTTP bilaterally  Distal pulses palpable bilaterally.     PLAN:  POD 1   S/p L2-3 and L4-5 decompression, dural tear  -keep flat on back 24 more hours  -no headache noted today  -continue current pain meds   -plan for starting PT tomorrow     Watson Orozco PA-C

## 2020-10-28 NOTE — PROGRESS NOTES
OT eval order received and acknowledged. Per RN and STEPHEN pt on continued bedrest today with therapy to start tomorrow. Will hold at this time and follow up with pt tomorrow for OT evaluation as appropriate. Thank you.

## 2020-10-28 NOTE — ROUTINE PROCESS
Bedside shift change report given to sal martinez rn (oncoming nurse) by Destiny Rollins rn (offgoing nurse). Report included the following information SBAR, Kardex and Intake/Output.

## 2020-10-29 LAB
ANION GAP SERPL CALC-SCNC: 6 MMOL/L (ref 5–15)
BACTERIA SPEC CULT: NORMAL
BASOPHILS # BLD: 0 K/UL (ref 0–0.1)
BASOPHILS NFR BLD: 0 % (ref 0–1)
BUN SERPL-MCNC: 15 MG/DL (ref 6–20)
BUN/CREAT SERPL: 13 (ref 12–20)
CA-I BLD-MCNC: 8.3 MG/DL (ref 8.5–10.1)
CHLORIDE SERPL-SCNC: 106 MMOL/L (ref 97–108)
CO2 SERPL-SCNC: 27 MMOL/L (ref 21–32)
CREAT SERPL-MCNC: 1.15 MG/DL (ref 0.7–1.3)
DIFFERENTIAL METHOD BLD: ABNORMAL
EOSINOPHIL # BLD: 0 K/UL (ref 0–0.4)
EOSINOPHIL NFR BLD: 0 % (ref 0–7)
ERYTHROCYTE [DISTWIDTH] IN BLOOD BY AUTOMATED COUNT: 14 % (ref 11.5–14.5)
GLUCOSE SERPL-MCNC: 112 MG/DL (ref 65–100)
HCT VFR BLD AUTO: 42.1 % (ref 36.6–50.3)
HGB BLD-MCNC: 14.2 G/DL (ref 12.1–17)
IMM GRANULOCYTES # BLD AUTO: 0 K/UL (ref 0–0.04)
IMM GRANULOCYTES NFR BLD AUTO: 0 % (ref 0–0.5)
LYMPHOCYTES # BLD: 4 K/UL (ref 0.8–3.5)
LYMPHOCYTES NFR BLD: 26 % (ref 12–49)
MCH RBC QN AUTO: 31.9 PG (ref 26–34)
MCHC RBC AUTO-ENTMCNC: 33.7 G/DL (ref 30–36.5)
MCV RBC AUTO: 94.6 FL (ref 80–99)
MONOCYTES # BLD: 1.4 K/UL (ref 0–1)
MONOCYTES NFR BLD: 9 % (ref 5–13)
NEUTS SEG # BLD: 9.8 K/UL (ref 1.8–8)
NEUTS SEG NFR BLD: 65 % (ref 32–75)
PLATELET # BLD AUTO: 144 K/UL (ref 150–400)
PMV BLD AUTO: 11.9 FL (ref 8.9–12.9)
POTASSIUM SERPL-SCNC: 3.7 MMOL/L (ref 3.5–5.1)
RBC # BLD AUTO: 4.45 M/UL (ref 4.1–5.7)
SODIUM SERPL-SCNC: 139 MMOL/L (ref 136–145)
SPECIAL REQUESTS,SREQ: NORMAL
WBC # BLD AUTO: 15.2 K/UL (ref 4.1–11.1)

## 2020-10-29 PROCEDURE — 74011250637 HC RX REV CODE- 250/637: Performed by: PHYSICIAN ASSISTANT

## 2020-10-29 PROCEDURE — 97530 THERAPEUTIC ACTIVITIES: CPT

## 2020-10-29 PROCEDURE — 65270000029 HC RM PRIVATE

## 2020-10-29 PROCEDURE — 74011250637 HC RX REV CODE- 250/637: Performed by: ORTHOPAEDIC SURGERY

## 2020-10-29 PROCEDURE — 94760 N-INVAS EAR/PLS OXIMETRY 1: CPT

## 2020-10-29 PROCEDURE — 74011250636 HC RX REV CODE- 250/636: Performed by: ORTHOPAEDIC SURGERY

## 2020-10-29 PROCEDURE — 77010033678 HC OXYGEN DAILY

## 2020-10-29 PROCEDURE — 97161 PT EVAL LOW COMPLEX 20 MIN: CPT

## 2020-10-29 PROCEDURE — 80048 BASIC METABOLIC PNL TOTAL CA: CPT

## 2020-10-29 PROCEDURE — 97165 OT EVAL LOW COMPLEX 30 MIN: CPT

## 2020-10-29 PROCEDURE — 85025 COMPLETE CBC W/AUTO DIFF WBC: CPT

## 2020-10-29 PROCEDURE — 36415 COLL VENOUS BLD VENIPUNCTURE: CPT

## 2020-10-29 RX ADMIN — ASPIRIN 81 MG: 81 TABLET, COATED ORAL at 08:03

## 2020-10-29 RX ADMIN — OXYCODONE HYDROCHLORIDE 10 MG: 10 TABLET ORAL at 10:59

## 2020-10-29 RX ADMIN — ACETAMINOPHEN 650 MG: 325 TABLET, FILM COATED ORAL at 08:03

## 2020-10-29 RX ADMIN — SENNOSIDES AND DOCUSATE SODIUM 1 TABLET: 8.6; 5 TABLET ORAL at 20:37

## 2020-10-29 RX ADMIN — ACETAMINOPHEN 650 MG: 325 TABLET, FILM COATED ORAL at 22:41

## 2020-10-29 RX ADMIN — ALLOPURINOL 300 MG: 300 TABLET ORAL at 21:03

## 2020-10-29 RX ADMIN — AMLODIPINE BESYLATE 5 MG: 5 TABLET ORAL at 08:03

## 2020-10-29 RX ADMIN — GABAPENTIN 300 MG: 300 CAPSULE ORAL at 08:03

## 2020-10-29 RX ADMIN — Medication 10 ML: at 22:00

## 2020-10-29 RX ADMIN — PANTOPRAZOLE SODIUM 40 MG: 20 TABLET, DELAYED RELEASE ORAL at 08:03

## 2020-10-29 RX ADMIN — LOSARTAN POTASSIUM 100 MG: 50 TABLET, FILM COATED ORAL at 08:02

## 2020-10-29 RX ADMIN — ACETAMINOPHEN 650 MG: 325 TABLET, FILM COATED ORAL at 03:35

## 2020-10-29 RX ADMIN — HYDROCHLOROTHIAZIDE 12.5 MG: 25 TABLET ORAL at 08:03

## 2020-10-29 RX ADMIN — CYCLOBENZAPRINE 10 MG: 10 TABLET, FILM COATED ORAL at 08:03

## 2020-10-29 RX ADMIN — GABAPENTIN 300 MG: 300 CAPSULE ORAL at 20:36

## 2020-10-29 RX ADMIN — ATORVASTATIN CALCIUM 20 MG: 20 TABLET, FILM COATED ORAL at 21:03

## 2020-10-29 RX ADMIN — PROPRANOLOL HYDROCHLORIDE 30 MG: 10 TABLET ORAL at 16:14

## 2020-10-29 RX ADMIN — SODIUM CHLORIDE 125 ML/HR: 9 INJECTION, SOLUTION INTRAVENOUS at 19:34

## 2020-10-29 RX ADMIN — OXYCODONE HYDROCHLORIDE 10 MG: 10 TABLET ORAL at 03:35

## 2020-10-29 RX ADMIN — MELOXICAM 15 MG: 7.5 TABLET ORAL at 16:14

## 2020-10-29 RX ADMIN — FENOFIBRATE 145 MG: 145 TABLET, FILM COATED ORAL at 21:03

## 2020-10-29 RX ADMIN — METFORMIN HYDROCHLORIDE 500 MG: 500 TABLET ORAL at 08:03

## 2020-10-29 RX ADMIN — CYCLOBENZAPRINE 10 MG: 10 TABLET, FILM COATED ORAL at 14:31

## 2020-10-29 NOTE — PROGRESS NOTES
Received patient from [facility] via wheelchair accompanied by nurse, PCT and son. Patient seated on the chair, verbalized that he is more comfortbale sitting for now, not in distress. Green drained and placed below bladder. Left patient in room, call light on his lap. No concerns at the moment.

## 2020-10-29 NOTE — PROGRESS NOTES
Skin assessment done by this RN with Kylie Harvey RN. Patient has surgical incision midline on the lower back with 4x4 gauze dressing and ABD pad with tape, dry skin on bilateral foot. Scattered redness on the face and all over the body. No other skin issues noted.

## 2020-10-29 NOTE — PROGRESS NOTES
OCCUPATIONAL THERAPY EVALUATION  Patient: Allyson Weller (42 y.o. male)  Date: 10/29/2020  Primary Diagnosis: Spinal stenosis, lumbar region, with neurogenic claudication [M48.062]  Acquired spondylolisthesis [M43.10]  Lumbar stenosis with neurogenic claudication [M48.062]  Procedure(s) (LRB):  L2 - L3 & L4 - L5 Laminectomy, durotomies of both levels (N/A) 2 Days Post-Op   Precautions: Fall, back precautions, brace on when OOB      ASSESSMENT  Based on the objective data described below, the patient presents with deficits in generalized strength, functional activity tolerance, dynamic sitting balance, static/dynamic standing balance, pain, and back precautions impacting overall ADL performance and functional transfers/mobility. Per pt, he lives with his spouse in a two story home, however has been staying with his son and plans to d/c to son's home with support of family at d/c. Pt also reports being IND with ADLs with increased time/effort and ambulating short distances 2' pain. Pt educated on log rolling, back precautions, and compensatory dressing techniques with use of AE with good understanding verbalized/demonstrated throughout evaluation. Pt provided with reacher and sock aide for home use. Pt currently requires mod A x2 for bed mobility, total A to don socks at bed level, setup assist to don back brace EOB, setup for retrieval to open mealtime containers, and mod Ax2 sit><stand with RW from EOB to chair. Pt noted to hold his breath during functional mobility, however follows cues to relax when prompted. Pt would benefit from continued skilled OT services during hospital stay and at next level of care to address current impairments and improve overall IND and safety with ADLs and functional mobility upon d/c. Other factors to consider for discharge: Family support, DME     Patient will benefit from skilled therapy intervention to address the above noted impairments.        PLAN :  Recommendations and Planned Interventions: self care training, functional mobility training, therapeutic exercise, balance training, therapeutic activities, endurance activities, patient education, home safety training, and family training/education    Frequency/Duration: Patient will be followed by occupational therapy 5 times a week to address goals. Recommendation for discharge: (in order for the patient to meet his/her long term goals)  HHOT with family assist     This discharge recommendation:  Has been made in collaboration with the attending provider and/or case management    IF patient discharges home will need the following DME: shower chair       SUBJECTIVE:   Patient stated my family will be able to help me at discharge    OBJECTIVE DATA SUMMARY:   HISTORY:   Past Medical History:   Diagnosis Date    Arthritis     Cancer ([facility] [address]     colon,skin    [facility]     GERD (gastroesophageal reflux disease)     Gout     Hyperlipidemia     Hypertension     Ill-defined condition     obesity   BMI= 35.9 om 11/21 2016     Past Surgical History:   Procedure Laterality Date    HX COLECTOMY  2000    HX COLONOSCOPY      HX ENDOSCOPY      HX HEENT Bilateral     Lasik    HX KNEE ARTHROSCOPY      HX MALIGNANT SKIN LESION EXCISION      HX ORTHOPAEDIC Left     shoulder reconstruction and replacement       Expanded or extensive additional review of patient history:     Home Situation  Home Environment: Private residence  One/Two Story Residence: One story  Living Alone: No  Support Systems: Spouse/Significant Other/Partner  Patient Expects to be Discharged to[de-identified] Private residence  Current DME Used/Available at Home: Wheelchair  Tub or Shower Type: Other (comment)(walk in shower and tub/shower)    Hand dominance: Left    EXAMINATION OF PERFORMANCE DEFICITS:  Cognitive/Behavioral Status:  Neurologic State: Alert  Orientation Level: Oriented X4  Cognition: Follows commands; Appropriate decision making    Hearing:   Auditory  Auditory Impairment: None    Vision/Perceptual:       WFL    Range of Motion:  B UE AROM generally decreased, functional.     Strength:  B UE strength generally decreased, functional.  3+/5. Coordination:     Fine Motor Skills-Upper: Left Intact; Right Intact    Gross Motor Skills-Upper: Left Intact; Right Intact    Tone & Sensation:  WFL B UEs    Balance:  Sitting: Intact; With support  Standing: Impaired; With support  Standing - Static: Fair  Standing - Dynamic : Fair;Constant support    Functional Mobility and Transfers for ADLs:  Bed Mobility:  Rolling: Moderate assistance;Assist x2  Supine to Sit: Moderate assistance;Assist x2  Scooting: Moderate assistance;Assist x2    Transfers:  Sit to Stand: Moderate assistance;Assist x2  Stand to Sit: Moderate assistance;Assist x2  Bed to Chair: Moderate assistance;Assist x2    ADL Intervention and task modifications:  Feeding  Container Management: Set-up; Supervision    Lower Body Dressing Assistance  Socks: Total assistance (dependent)  Position Performed: Long sitting on bed    Therapeutic Exercise:  Pt would benefit from UE HEP to improve overall UE AROM/strength and can be further educated in next treatment session. Functional Measure:    [address] [place] [id number] AM-PAC \"6 Clicks\"                                                       Daily Activity Inpatient Short Form  How much help from another person does the patient currently need. .. Total; A Lot A Little None   1. Putting on and taking off regular lower body clothing? [x]  1 []  2 []  3 []  4   2. Bathing (including washing, rinsing, drying)? []  1 [x]  2 []  3 []  4   3. Toileting, which includes using toilet, bedpan or urinal? [] 1 [x]  2 []  3 []  4   4. Putting on and taking off regular upper body clothing? []  1 []  2 [x]  3 []  4   5. Taking care of personal grooming such as brushing teeth? []  1 []  2 [x]  3 []  4   6. Eating meals?  []  1 []  2 [x]  3 []  4   © 2007, Trustees of [address] [place] [address] [address] [address], under license to [employer]. All rights reserved     Score:      Interpretation of Tool:  Represents clinically-significant functional categories (i.e. Activities of daily living). Percentage of Impairment CH    0%   CI    1-19% CJ    20-39% CK    40-59% CL    60-79% CM    80-99% CN     100%   [facility]  Score 6-24 24 23 20-22 15-19 10-14 7-9 6        Occupational Therapy Evaluation Charge Determination   History Examination Decision-Making   LOW Complexity : Brief history review  LOW Complexity : 1-3 performance deficits relating to physical, cognitive , or psychosocial skils that result in activity limitations and / or participation restrictions  MEDIUM Complexity : Patient may present with comorbidities that affect occupational performnce. Miniml to moderate modification of tasks or assistance (eg, physical or verbal ) with assesment(s) is necessary to enable patient to complete evaluation       Based on the above components, the patient evaluation is determined to be of the following complexity level: LOW   Pain Ratin-6/10 R hip with mobility     Activity Tolerance:   Fair    After treatment patient left in no apparent distress:    Sitting in chair and Call bell within reach, son and nursing present at bedside upon OT departure     COMMUNICATION/EDUCATION:   The patients plan of care was discussed with: Physical therapist and Registered nurse. Patient/family have participated as able in goal setting and plan of care. and Patient/family agree to work toward stated goals and plan of care. This patients plan of care is appropriate for delegation to [facility]. OT/PT sessions occurred together for increased patient and clinician safety as pt requires A of 2 for mobility at this time.     Thank you for this referral.  Erika Apodaca  Time Calculation: 33 mins    Problem: Self Care Deficits Care Plan (Adult)  Goal: *Acute Goals and Plan of Care (Insert Text)  Description: Pt will be Mod I sup <> sit in prep for EOB ADLs  Pt will be Mod I grooming sitting EOB  Pt will be Mod I LE dressing sitting EOB/long sit  Pt will be Mod I sit <>  prep for toileting LRAD  Pt will be Mod I toileting/toilet transfer/cloth mgmt LRAD  Pt will be IND following UE HEP in prep for self care tasks     Outcome: Not Met

## 2020-10-29 NOTE — ROUTINE PROCESS
Bedside shift change report given to sal martinez rn (oncoming nurse) by Lashonda Schwartz rn (offgoing nurse). Report included the following information Kardex and Intake/Output.

## 2020-10-29 NOTE — PROGRESS NOTES
PHYSICAL THERAPY EVALUATION  Patient: Gonsalo Love (39 y.o. male)  Date: 10/29/2020  Primary Diagnosis: Spinal stenosis, lumbar region, with neurogenic claudication [M48.062]  Acquired spondylolisthesis [M43.10]  Lumbar stenosis with neurogenic claudication [M48.062]  Procedure(s) (LRB):  L2 - L3 & L4 - L5 Laminectomy, durotomies of both levels (N/A) 2 Days Post-Op   Precautions: falls, spinal precautions (use brace with OOB mobility)       ASSESSMENT  Based on the objective data described below, the patient presents with generalized weakness, decreased activity tolerance, impaired dynamic sitting balance, impaired static/dynamic standing balance, pain limiting function, and back precautions impacting overall amb performance and functional transfers/mobility. Per pt, he lives with his spouse in a two story home, however has been staying with his son and plans to d/c to son's home with support of family at d/c. Pt also reports being IND with ADLs with increased time/effort and ambulating short distances 2' pain. Pt educated on log rolling, back precautions, and use of RW with amb to improve balance and safety, with good understanding verbalized/demonstrated throughout evaluation. Pt sidelying in bed upon PT/OT arrival, agreeable to evaluation. Pt required mod A x2 for bed mobility, mod A x2 for supine > sit transfers, and mod Ax2 with increased time sit<>stand with RW from EOB to chair. Pt amb 3 feet from bed to bedside chair with RW, gt belt, and mod A X2 demonstrates WBOS with short, shuffling gt pattern and right LE buckling when attempting to step backwards to chair. Pt noted to hold his breath during functional mobility, however follows cues to relax when prompted.  Pt would benefit from continued skilled PT services during hospital stay and at next level of care to address current impairments and improve overall IND and safety with amb, ADLs and functional mobility upon d/c.      Other factors to consider for discharge: Family support, DME     Patient will benefit from skilled therapy intervention to address the above noted impairments. PLAN :  Recommendations and Planned Interventions: bed mobility training, transfer training, gait training, therapeutic exercises, patient and family training/education and therapeutic activities      Frequency/Duration: Patient will be followed by physical therapy:  twice daily to address goals. Recommendation for discharge: (in order for the patient to meet his/her long term goals)  HHPT with increased family assist and possibly RW    This discharge recommendation:  Has been made in collaboration with the attending provider and/or case management    IF patient discharges home will need the following DME: rolling walker         SUBJECTIVE:   Patient stated I'm doing ok.     OBJECTIVE DATA SUMMARY:   HISTORY:    Past Medical History:   Diagnosis Date    Arthritis     Cancer ([facility]     colon,skin    Diabetes ([facility]     GERD (gastroesophageal reflux disease)     Gout     Hyperlipidemia     Hypertension     Ill-defined condition     obesity   BMI= 35.9 om 11/21 2016     Past Surgical History:   Procedure Laterality Date    HX COLECTOMY  2000    HX COLONOSCOPY      HX ENDOSCOPY      HX HEENT Bilateral     Lasik    HX KNEE ARTHROSCOPY      HX MALIGNANT SKIN LESION EXCISION      HX ORTHOPAEDIC Left     shoulder reconstruction and replacement       Personal factors and/or comorbidities impacting plan of care: suffered dura tear during surgery     Home Situation  Home Environment: Private residence  One/Two Story Residence: One story  Living Alone: No  Support Systems: Spouse/Significant Other/Partner  Patient Expects to be Discharged to[de-identified] Private residence  Current DME Used/Available at Home: Wheelchair  Tub or Shower Type:  Other (comment)(walk in shower and tub/shower)    EXAMINATION/PRESENTATION/DECISION MAKING:   Critical Behavior:  Neurologic State: Alert  Orientation Level: Oriented X4  Cognition: Follows commands, Appropriate decision making     Hearing: Auditory  Auditory Impairment: None  Skin:  Bandage on lower back, no drainage noted   Edema: none noted   Range Of Motion:  AROM: Generally decreased, functional(grossly limited due to weakness)                       Strength:    Strength: Generally decreased, functional(grossly 3-/5)                    Tone & Sensation:                                  Coordination:     Vision:      Functional Mobility:  Bed Mobility:  Rolling: Moderate assistance;Assist x2  Supine to Sit: Moderate assistance;Assist x2     Scooting: Moderate assistance;Assist x2  Transfers:  Sit to Stand: Moderate assistance;Assist x2  Stand to Sit: Moderate assistance;Assist x2        Bed to Chair: Moderate assistance;Assist x2              Balance:   Sitting: Intact; With support  Standing: Impaired; With support  Standing - Static: Fair  Standing - Dynamic : Fair;Constant support  Ambulation/Gait Training:        Ambulation - Level of Assistance: Moderate assistance;Assist x2     Gait Description (WDL): Ex[facility] to [facility]           Base of Support: Widened     Speed/Ninoska: Pace decreased (<100 feet/min); Shuffled; Slow    Therapeutic Exercises:   Not completed this session     Functional Measure:    [facility] [address] [address] AM-PAC 6 Clicks         Basic Mobility Inpatient Short Form  How much difficulty does the patient currently have. .. Unable A Lot A Little None   1. Turning over in bed (including adjusting bedclothes, sheets and blankets)? [] 1   [] 2   [x] 3   [] 4   2. Sitting down on and standing up from a chair with arms ( e.g., wheelchair, bedside commode, etc.)   [] 1   [x] 2   [] 3   [] 4   3. Moving from lying on back to sitting on the side of the bed? [] 1   [x] 2   [] 3   [] 4          How much help from another person does the patient currently need. .. Total A Lot A Little None   4.   Moving to and from a bed to a chair (including a wheelchair)? [] 1   [x] 2   [] 3   [] 4   5. Need to walk in hospital room? [] 1   [x] 2   [] 3   [] 4   6. Climbing 3-5 steps with a railing? [] 1   [x] 2   [] 3   [] 4   © , Trustees of [address] [place] [address] [address] [address], under license to [employer]. All rights reserved     Score:  Initial:  Most Recent: X (Date: 10/29/2020 )   Interpretation of Tool:  Represents activities that are increasingly more difficult (i.e. Bed mobility, Transfers, Gait). Score 24 23 22-20 19-15 14-10 9-7 6   Modifier CH CI CJ CK CL CM CN         Physical Therapy Evaluation Charge Determination   History Examination Presentation Decision-Making   HIGH Complexity :3+ comorbidities / personal factors will impact the outcome/ POC  HIGH Complexity : 4+ Standardized tests and measures addressing body structure, function, activity limitation and / or participation in recreation  MEDIUM Complexity : Evolving with changing characteristics  Other outcome measures ampac 6  mod      Based on the above components, the patient evaluation is determined to be of the following complexity level: MEDIUM    Pain Ratin-6/10 right hip with mobility     Activity Tolerance:   Fair and requires rest breaks    After treatment patient left in no apparent distress:   Sitting in chair, Call bell within reach, Caregiver / family present, and nsg updated     GOALS:    Problem: Mobility Impaired (Adult and Pediatric)  Goal: *Acute Goals and Plan of Care (Insert Text)  Description: Pt will be I with LE HEP in 7 days. Pt will perform bed mobility with mod I in 7 days. Pt will perform transfers with mod I in 7 days. Pt will amb 100-200 feet with LRAD safely with mod I in 7 days. Pt will ascend/descend 4 steps with B handrails and CGA in 7 days to safely enter home. Outcome: Not Met       COMMUNICATION/EDUCATION:   The patients plan of care was discussed with: Occupational therapist and Registered nurse.      Fall prevention education was provided and the patient/caregiver indicated understanding., Patient/family have participated as able in goal setting and plan of care. , and Patient/family agree to work toward stated goals and plan of care. PT/OT sessions occurred together for increased safety of pt and clinician.     Thank you for this referral.  Molly Gant, PT, DPT   Time Calculation: 33 mins

## 2020-10-29 NOTE — PROGRESS NOTES
NAME:     Merline Mccarthy   :       1945   MRN:       [id number]   DATE:      10/29/2020    POD:    2 Days Post-Op  S/P:    Procedure(s):  L2 - L3 & L4 - L5 Laminectomy, durotomies of both levels    SUBJECTIVE:    Patient doing well. Low back pain moderate. Leg symptoms improving. Recent Labs     10/28/20  0530   HGB 15.3   HCT 45.5      K 3.8      CO2 28   BUN 22*   CREA 1.27   *     Patient Vitals for the past 12 hrs:   BP Temp Pulse Resp SpO2   10/29/20 0704 (!) 146/73 99.1 °F (37.3 °C) 87 18 91 %   10/29/20 0335 (!) 152/76 100.2 °F (37.9 °C) 93 20 90 %   10/29/20 0053 -- 100.4 °F (38 °C) -- 20 91 %   10/28/20 2330 (!) 142/58 (!) 101 °F (38.3 °C) 92 20 91 %       EXAM:  No motor or sensory deficits noted. 2+ reflexes   Dressing clean, dry, intact. Incision without erythema, induration, fluctuance. Calves NTTP bilaterally  Distal pulses palpable bilaterally.     PLAN:  Plan to start mobilization today  PT/OT  Continue current pain meds  Temp overnight - pushed spirometer    Melodie Fox PA-C lum

## 2020-10-29 NOTE — PROGRESS NOTES
Pt transferred to 213 per order. Report called to cat Saunders. Pt was wheeled down to room with myself, brian, cna and pt son along with all of pt personal belongings. Pt oriented to  Room and assisted to bed side chair. Handed off care to [place]. RN after introductions were made and pt was comfortable. Chart placed at nurses station by the  Unit secretary computer.

## 2020-10-30 ENCOUNTER — APPOINTMENT (OUTPATIENT)
Dept: GENERAL RADIOLOGY | Age: 75
DRG: 519 | End: 2020-10-30
Attending: PHYSICIAN ASSISTANT
Payer: MEDICARE

## 2020-10-30 LAB
GLUCOSE BLD STRIP.AUTO-MCNC: 121 MG/DL (ref 65–100)
GLUCOSE BLD STRIP.AUTO-MCNC: 135 MG/DL (ref 65–100)
GLUCOSE BLD STRIP.AUTO-MCNC: 141 MG/DL (ref 65–100)
GLUCOSE BLD STRIP.AUTO-MCNC: 144 MG/DL (ref 65–100)
PERFORMED BY, TECHID: ABNORMAL

## 2020-10-30 PROCEDURE — 94760 N-INVAS EAR/PLS OXIMETRY 1: CPT

## 2020-10-30 PROCEDURE — 71045 X-RAY EXAM CHEST 1 VIEW: CPT

## 2020-10-30 PROCEDURE — 74011250637 HC RX REV CODE- 250/637: Performed by: PHYSICIAN ASSISTANT

## 2020-10-30 PROCEDURE — 74011250636 HC RX REV CODE- 250/636: Performed by: ORTHOPAEDIC SURGERY

## 2020-10-30 PROCEDURE — 65270000029 HC RM PRIVATE

## 2020-10-30 PROCEDURE — 77010033678 HC OXYGEN DAILY

## 2020-10-30 PROCEDURE — 74011250636 HC RX REV CODE- 250/636: Performed by: PHYSICIAN ASSISTANT

## 2020-10-30 PROCEDURE — 97530 THERAPEUTIC ACTIVITIES: CPT

## 2020-10-30 PROCEDURE — 97116 GAIT TRAINING THERAPY: CPT

## 2020-10-30 PROCEDURE — 74011250637 HC RX REV CODE- 250/637: Performed by: ORTHOPAEDIC SURGERY

## 2020-10-30 PROCEDURE — 82962 GLUCOSE BLOOD TEST: CPT

## 2020-10-30 RX ORDER — LABETALOL HCL 20 MG/4 ML
20 SYRINGE (ML) INTRAVENOUS
Status: DISCONTINUED | OUTPATIENT
Start: 2020-10-30 | End: 2020-11-01 | Stop reason: HOSPADM

## 2020-10-30 RX ORDER — FACIAL-BODY WIPES
10 EACH TOPICAL DAILY
Status: DISCONTINUED | OUTPATIENT
Start: 2020-10-30 | End: 2020-11-01 | Stop reason: HOSPADM

## 2020-10-30 RX ORDER — INSULIN LISPRO 100 [IU]/ML
INJECTION, SOLUTION INTRAVENOUS; SUBCUTANEOUS
Status: DISCONTINUED | OUTPATIENT
Start: 2020-10-30 | End: 2020-11-01 | Stop reason: HOSPADM

## 2020-10-30 RX ORDER — AMLODIPINE BESYLATE 5 MG/1
10 TABLET ORAL DAILY
Status: DISCONTINUED | OUTPATIENT
Start: 2020-10-31 | End: 2020-10-30

## 2020-10-30 RX ORDER — FACIAL-BODY WIPES
10 EACH TOPICAL DAILY
Status: DISCONTINUED | OUTPATIENT
Start: 2020-10-31 | End: 2020-10-30 | Stop reason: SDUPTHER

## 2020-10-30 RX ORDER — FUROSEMIDE 10 MG/ML
20 INJECTION INTRAMUSCULAR; INTRAVENOUS ONCE
Status: COMPLETED | OUTPATIENT
Start: 2020-10-30 | End: 2020-10-30

## 2020-10-30 RX ORDER — DEXTROSE 50 % IN WATER (D50W) INTRAVENOUS SYRINGE
25-50 AS NEEDED
Status: DISCONTINUED | OUTPATIENT
Start: 2020-10-30 | End: 2020-11-01 | Stop reason: HOSPADM

## 2020-10-30 RX ORDER — MAGNESIUM SULFATE 100 %
4 CRYSTALS MISCELLANEOUS AS NEEDED
Status: DISCONTINUED | OUTPATIENT
Start: 2020-10-30 | End: 2020-11-01 | Stop reason: HOSPADM

## 2020-10-30 RX ORDER — AMLODIPINE BESYLATE 5 MG/1
10 TABLET ORAL DAILY
Status: DISCONTINUED | OUTPATIENT
Start: 2020-10-31 | End: 2020-11-01 | Stop reason: HOSPADM

## 2020-10-30 RX ADMIN — PROPRANOLOL HYDROCHLORIDE 30 MG: 10 TABLET ORAL at 05:55

## 2020-10-30 RX ADMIN — OXYCODONE HYDROCHLORIDE 5 MG: 5 TABLET ORAL at 06:40

## 2020-10-30 RX ADMIN — OXYCODONE HYDROCHLORIDE 10 MG: 10 TABLET ORAL at 21:35

## 2020-10-30 RX ADMIN — HYDROCHLOROTHIAZIDE 12.5 MG: 25 TABLET ORAL at 09:34

## 2020-10-30 RX ADMIN — AMLODIPINE BESYLATE 5 MG: 5 TABLET ORAL at 09:35

## 2020-10-30 RX ADMIN — GABAPENTIN 300 MG: 300 CAPSULE ORAL at 21:35

## 2020-10-30 RX ADMIN — Medication 10 ML: at 21:38

## 2020-10-30 RX ADMIN — SODIUM CHLORIDE 125 ML/HR: 9 INJECTION, SOLUTION INTRAVENOUS at 04:47

## 2020-10-30 RX ADMIN — POLYETHYLENE GLYCOL 3350 17 G: 17 POWDER, FOR SOLUTION ORAL at 09:33

## 2020-10-30 RX ADMIN — ALLOPURINOL 300 MG: 300 TABLET ORAL at 21:36

## 2020-10-30 RX ADMIN — PANTOPRAZOLE SODIUM 40 MG: 20 TABLET, DELAYED RELEASE ORAL at 09:34

## 2020-10-30 RX ADMIN — LOSARTAN POTASSIUM 100 MG: 50 TABLET, FILM COATED ORAL at 09:34

## 2020-10-30 RX ADMIN — METFORMIN HYDROCHLORIDE 500 MG: 500 TABLET ORAL at 09:34

## 2020-10-30 RX ADMIN — GABAPENTIN 300 MG: 300 CAPSULE ORAL at 09:34

## 2020-10-30 RX ADMIN — FUROSEMIDE 20 MG: 10 INJECTION, SOLUTION INTRAMUSCULAR; INTRAVENOUS at 17:59

## 2020-10-30 RX ADMIN — FENOFIBRATE 145 MG: 145 TABLET, FILM COATED ORAL at 21:35

## 2020-10-30 RX ADMIN — ATORVASTATIN CALCIUM 20 MG: 20 TABLET, FILM COATED ORAL at 21:36

## 2020-10-30 RX ADMIN — ASPIRIN 81 MG: 81 TABLET, COATED ORAL at 09:34

## 2020-10-30 RX ADMIN — PANTOPRAZOLE SODIUM 40 MG: 20 TABLET, DELAYED RELEASE ORAL at 09:00

## 2020-10-30 RX ADMIN — SENNOSIDES AND DOCUSATE SODIUM 1 TABLET: 8.6; 5 TABLET ORAL at 09:34

## 2020-10-30 RX ADMIN — ACETAMINOPHEN 650 MG: 325 TABLET, FILM COATED ORAL at 05:55

## 2020-10-30 RX ADMIN — SENNOSIDES AND DOCUSATE SODIUM 1 TABLET: 8.6; 5 TABLET ORAL at 21:35

## 2020-10-30 RX ADMIN — MELOXICAM 15 MG: 7.5 TABLET ORAL at 09:34

## 2020-10-30 NOTE — PROGRESS NOTES
CM assessed patient at bedside. Patient was admitted to the hospital for chronic left shoulder pain. Patient has knee problems which prevents him from being active. Patient has an RUR score of 7%. Patient resides with his wife Charlene Logan [phone]) and his two sons. Patient reported the house is split level and has \" a lot of interior stairs\". Patient has no DME, no falling, and driving. Patient reported that following hospital discharge he will be living at:  [address] [address]    Patient reported that he is under the care of Dr. Riccardo Johansen and had an appointment last week. Patient has no POA. Patient has no past HH or rehab. NOK is wife Charlene Logan [phone]    CM provided patient with a freedom choice list for [facility]. Patient wants to review the list with his wife. CM will continue to follow the patient for discharge planning needs. ADDENDUM  Patient's choice is HH is [facility]. CM sent a referral on [facility].

## 2020-10-30 NOTE — PROGRESS NOTES
PHYSICAL THERAPY TREATMENT  Patient: Natalio Hassan (32 y.o. male)  Date: 10/30/2020  Diagnosis: Spinal stenosis, lumbar region, with neurogenic claudication [M48.062]  Acquired spondylolisthesis [M43.10]  Lumbar stenosis with neurogenic claudication [M48.062]   <principal problem not specified>  Procedure(s) (LRB):  L2 - L3 & L4 - L5 Laminectomy, durotomies of both levels (N/A) 3 Days Post-Op  Precautions:    Chart, physical therapy assessment, plan of care and goals were reviewed. ASSESSMENT  Patient continues with skilled PT services and is progressing towards goals. Pt seen BID this visit for ambulation session. Pt cont to progress with ambulation and was able to amb approx 75 ft in the room with no LOB during session and CGA. Continue to require more assistance with bed mobility at this time but able to logroll with assistance. Pt's son present and verbalized that he can assist with some mobility at home and pt  can enter house using back door with no steps Pt progressing overall, rec HHPT with family assistance and RW. Current Level of Function Impacting Discharge (mobility/balance): needs RW, assistance at home. PLAN :  Patient continues to benefit from skilled intervention to address the above impairments. Continue treatment per established plan of care. to address goals. Recommendation for discharge: (in order for the patient to meet his/her long term goals)  Physical therapy at least 2 days/week in the home AND ensure assist and/or supervision for safety with bed mobility and transfers.     This discharge recommendation:  Has been made in collaboration with the attending provider and/or case management    IF patient discharges home will need the following DME: rolling walker       SUBJECTIVE:   Patient stated sure I will get up again    OBJECTIVE DATA SUMMARY:   Critical Behavior:  Neurologic State: Alert  Orientation Level: Appropriate for age, Oriented X4  Cognition: Appropriate for age attention/concentration     Functional Mobility Training:  Bed Mobility:  Rolling: Minimum assistance;Assist x1  Supine to Sit: Minimum assistance     Scooting: Moderate assistance        Transfers:  Sit to Stand: Moderate assistance  Stand to Sit: Minimum assistance        Bed to Chair: Minimum assistance;Assist x2    Balance:  Sitting: Intact; With support  Ambulation/Gait Training:  Distance (ft): 75 Feet (ft)     Ambulation - Level of Assistance: Contact guard assistance    Base of Support: Widened     Speed/Ninoska: Pace decreased (<100 feet/min)  Step Length: Right shortened;Left shortened      Pain Ratin/10 at rest, 3/10 with activity in back    Activity Tolerance:   Fair  Please refer to the flowsheet for vital signs taken during this treatment. After treatment patient left in no apparent distress: In bed with call button at reach, family present. COMMUNICATION/COLLABORATION:   The patients plan of care was discussed with: Occupational therapy assistant. Sophie Carmen   Time Calculation: 24 mins           Problem: Mobility Impaired (Adult and Pediatric)  Goal: *Acute Goals and Plan of Care (Insert Text)  Description: Pt will be I with LE HEP in 7 days. Pt will perform bed mobility with mod I in 7 days. Pt will perform transfers with mod I in 7 days. Pt will amb 100-200 feet with LRAD safely with mod I in 7 days. Pt will ascend/descend 4 steps with B handrails and CGA in 7 days to safely enter home.        10/30/2020 1426 by Rekha Domingo  Outcome: Progressing Towards Goal

## 2020-10-30 NOTE — PROGRESS NOTES
NAME:     Carlos Eduardo Byers   :       1945   MRN:       [id number]   DATE:      10/30/2020    POD:    3 Days Post-Op  S/P:    Procedure(s):  L2 - L3 & L4 - L5 Laminectomy, durotomies of both levels    SUBJECTIVE:    Patient doing well. Low back pain moderate. Leg symptoms improving. Recent Labs     10/29/20  1021   HGB 14.2   HCT 42.1      K 3.7      CO2 27   BUN 15   CREA 1.15   *     Patient Vitals for the past 12 hrs:   BP Temp Pulse Resp SpO2   10/30/20 0831 123/75 97.9 °F (36.6 °C) 85 18 93 %   10/30/20 0733 -- -- -- -- 92 %   10/30/20 0344 (!) 160/78 99.4 °F (37.4 °C) 77 20 94 %   10/30/20 0051 (!) 150/75 99.4 °F (37.4 °C) 79 20 90 %   10/29/20 2250 (!) 156/73 100.4 °F (38 °C) 85 20 90 %       EXAM:  No motor or sensory deficits noted. 2+ reflexes   Dressing clean, dry, intact. Incision without erythema, induration, fluctuance. Calves NTTP bilaterally  Distal pulses palpable bilaterally.     PLAN:  Continue PT/OT - was able to stand and walk some yesterday  No headache noted  Plan for D/C home or possible rehab 1-2 days depending on ambulation status     Krysta Orr PA-C

## 2020-10-30 NOTE — PROGRESS NOTES
Patient is was accepted to [facility]. CM will continue to follow patient for discharge planning needs.

## 2020-10-30 NOTE — PROGRESS NOTES
Problem: Self Care Deficits Care Plan (Adult)  Goal: *Acute Goals and Plan of Care (Insert Text)  Description: Pt will be Mod I sup <> sit in prep for EOB ADLs  Pt will be Mod I grooming sitting EOB  Pt will be Mod I LE dressing sitting EOB/long sit  Pt will be Mod I sit <>  prep for toileting LRAD  Pt will be Mod I toileting/toilet transfer/cloth mgmt LRAD  Pt will be IND following UE HEP in prep for self care tasks     Outcome: Progressing Towards Goal  OCCUPATIONAL THERAPY TREATMENT  Patient: Johanna Joseph (76 y.o. male)  Date: 10/30/2020  Diagnosis: Spinal stenosis, lumbar region, with neurogenic claudication [M48.062]  Acquired spondylolisthesis [M43.10]  Lumbar stenosis with neurogenic claudication [M48.062]   <principal problem not specified>  Procedure(s) (LRB):  L2 - L3 & L4 - L5 Laminectomy, durotomies of both levels (N/A) 3 Days Post-Op  Precautions:    Chart, occupational therapy assessment, plan of care, and goals were reviewed. ASSESSMENT  Patient continues with skilled OT services and is progressing towards goals. Pt. Overall bed mobility Min A , functional transfers Mod A , functional mobility Min A x2. Pt performed BSC and chair transfer with Min A x2 for lifting and lowering assistance. Pt able to recite 3/3 precautions. PLAN :  Patient continues to benefit from skilled intervention to address the above impairments. Continue treatment per established plan of care. to address goals.     Recommendation for discharge: (in order for the patient to meet his/her long term goals)  Occupational therapy at least 2 days/week in the home with family assistance    This discharge recommendation:  Has been made in collaboration with the attending provider and/or case management    IF patient discharges home will need the following DME: AE: long handled bathing, AE: long handled dressing, bedside commode, and walker: rolling       SUBJECTIVE:   Patient stated I will be staying with my son when I leave the hospital.    OBJECTIVE DATA SUMMARY:   Cognitive/Behavioral Status:  Neurologic State: Alert  Orientation Level: Appropriate for age;Oriented X4  Cognition: Appropriate for age attention/concentration    Functional Mobility and Transfers for ADLs:  Bed Mobility:  Rolling: Minimum assistance;Assist x1  Supine to Sit: Minimum assistance  Scooting: Moderate assistance    Transfers:  Sit to Stand: Moderate assistance  Functional Transfers  Toilet Transfer : Minimum assistance  Bed to Chair: Minimum assistance;Assist x2    Balance:  Sitting: Intact; With support    Pain:  2/ 10 pain in back     Activity Tolerance:   Fair and requires rest breaks  Please refer to the flowsheet for vital signs taken during this treatment. After treatment patient left in no apparent distress:   Sitting in chair, Heels elevated for pressure relief, and Caregiver / family present    COMMUNICATION/COLLABORATION:   The patients plan of care was discussed with: Physical therapy assistant.  Co-tx with PTA for increased safety with bed mobility and transfes    Cecillia Skains  Time Calculation: 34 mins

## 2020-10-30 NOTE — PROGRESS NOTES
PHYSICAL THERAPY TREATMENT  Patient: Riky Ballard (65 y.o. male)  Date: 10/30/2020  Diagnosis: Spinal stenosis, lumbar region, with neurogenic claudication [M48.062]  Acquired spondylolisthesis [M43.10]  Lumbar stenosis with neurogenic claudication [M48.062]   <principal problem not specified>  Procedure(s) (LRB):  L2 - L3 & L4 - L5 Laminectomy, durotomies of both levels (N/A) 3 Days Post-Op  Precautions:    Chart, physical therapy assessment, plan of care and goals were reviewed. ASSESSMENT  Patient continues with skilled PT services and is progressing towards goals. Ambulation distance increased today with no c/o increased pain and no buckling of LEs noted. Patient ambulated around room well with RW and CGA-min A. Pt did need mod A for  bed mobility but rolled well via logrolling technique. Patient was able to transfer with modA x2 as well. Cueing needed for technique at times with mobility for safety. Pt able to don lumbar brace prior to ambulation and also verbalized understanding of back precautions. Current Level of Function Impacting Discharge (mobility/balance): assistance at home. Other factors to consider for discharge: equipment, family. PLAN :  Patient continues to benefit from skilled intervention to address the above impairments. Continue treatment per established plan of care. to address goals. Recommendation for discharge: (in order for the patient to meet his/her long term goals)  Physical therapy at least 2 days/week in the home AND ensure assist and/or supervision for safety with OOB mobility and transfers    This discharge recommendation:  Has been made in collaboration with the attending provider and/or case management    IF patient discharges home will need the following DME: rolling walker       SUBJECTIVE:   Patient stated \"I am going to stay at my son's house when I leave .     OBJECTIVE DATA SUMMARY:   Critical Behavior:  Neurologic State: Alert  Orientation Level: Appropriate for age, Oriented X4  Cognition: Appropriate for age attention/concentration     Functional Mobility Training:  Bed Mobility:  Rolling: Minimum assistance;Assist x1  Supine to Sit: Moderate assistance;Assist x2     Scooting: Moderate assistance        Transfers:  Sit to Stand: Assist x2; Moderate assistance  Stand to Sit: Moderate assistance;Assist x2        Bed to Chair: Minimum assistance;Assist x2    Balance:  Sitting: Intact; With support  Ambulation/Gait Training:  Distance (ft): 50 Feet (ft)(with gt belt and RW)     Ambulation - Level of Assistance: Minimal assistance;Contact guard assistance;Assist x2       Base of Support: Widened     Speed/Ninoska: Pace decreased (<100 feet/min)  Step Length: Left shortened;Right shortened    Pain Ratin-3/10    Activity Tolerance:   Good and requires rest breaks  Please refer to the flowsheet for vital signs taken during this treatment. After treatment patient left in no apparent distress:   Sitting in chair and Call bell within reach    COMMUNICATION/COLLABORATION:   The patients plan of care was discussed with: Occupational therapy assistant and Registered nurse. Drea Carmen   Time Calculation: 33 mins         Problem: Mobility Impaired (Adult and Pediatric)  Goal: *Acute Goals and Plan of Care (Insert Text)  Description: Pt will be I with LE HEP in 7 days. Pt will perform bed mobility with mod I in 7 days. Pt will perform transfers with mod I in 7 days. Pt will amb 100-200 feet with LRAD safely with mod I in 7 days. Pt will ascend/descend 4 steps with B handrails and CGA in 7 days to safely enter home.        Outcome: Progressing Towards Goal

## 2020-10-30 NOTE — CONSULTS
Consult    Patient: Aliya Sultana MRN: [id number]  SSN: [id number]    YOB: 1945  Age: 76 y.o. Sex: male      Subjective:      Aliya Sultana is a 76 y.o. male who is being seen for presented to the hospital for surgical intervention by Dr. Prasanna Dominguez to include L4-L5 laminectomies L2-L3 laminectomies with patch repair of durotomies at both levels. Patient has had persistent hypertension even while on losartan and Norvasc. He has been receiving 125 mL/h over the last 72 hours due to elevated creatinine. Hospital service been consulted for medical management. He continues to have lower back pain, moderate, status post surgical intervention.     Past Medical History:   Diagnosis Date    Arthritis     Cancer ([facility] [address]     colon,skin    Diabetes ([facility] [address]     GERD (gastroesophageal reflux disease)     Gout     Hyperlipidemia     Hypertension     Ill-defined condition     obesity   BMI= 35.9 om 11/21 2016     Past Surgical History:   Procedure Laterality Date    HX COLECTOMY  2000    HX COLONOSCOPY      HX ENDOSCOPY      HX HEENT Bilateral     Lasik    HX KNEE ARTHROSCOPY      HX MALIGNANT SKIN LESION EXCISION      HX ORTHOPAEDIC Left     shoulder reconstruction and replacement      Family History   Problem Relation Age of Onset    Lupus Mother     Diabetes Father     Hypertension Father     Lupus Sister      Social History     Tobacco Use    Smoking status: Never Smoker    Smokeless tobacco: Never Used   Substance Use Topics    Alcohol use: Never     Frequency: Never      Current Facility-Administered Medications   Medication Dose Route Frequency Provider Last Rate Last Dose    furosemide (LASIX) injection 20 mg  20 mg IntraVENous ONCE Ronald Mccord PA-C        [START ON 10/31/2020] amLODIPine (NORVASC) tablet 10 mg  10 mg Oral DAILY Randell Mccord PA-C        cyclobenzaprine (FLEXERIL) tablet 10 mg  10 mg Oral TID PRN Thelma Vega PA-C   10 mg at 10/29/20 1431    oxyCODONE IR (ROXICODONE) tablet 5 mg  5 mg Oral Q4H PRN Patty Pont, PA-C   5 mg at 10/30/20 0640    oxyCODONE IR (ROXICODONE) tablet 10 mg  10 mg Oral Q4H PRN Patty Pont, PA-C   10 mg at 10/29/20 1059    acetaminophen (TYLENOL) tablet 650 mg  650 mg Oral Q4H PRN Patty Pont, PA-C   650 mg at 10/30/20 0555    fenofibrate nanocrystallized (TRICOR) tablet 145 mg  145 mg Oral QHS Jessi Hendrickson MD   145 mg at 10/29/20 2103    pantoprazole (PROTONIX) tablet 40 mg  40 mg Oral DAILY Jessi Hendrickson MD   40 mg at 10/30/20 0934    allopurinoL (ZYLOPRIM) tablet 300 mg  300 mg Oral QHS Jessi Hendrickson MD   300 mg at 10/29/20 2103    gabapentin (NEURONTIN) capsule 300 mg  300 mg Oral BID Jessi Hendrickson MD   300 mg at 10/30/20 5320    metFORMIN (GLUCOPHAGE) tablet 500 mg  500 mg Oral DAILY Jessi Hendrickson MD   500 mg at 10/30/20 0934    meloxicam (MOBIC) tablet 15 mg  15 mg Oral DAILY Jessi Hendrickson MD   15 mg at 10/30/20 0934    propranoloL (INDERAL) tablet 30 mg  30 mg Oral ACB Jessi Hendrickson MD   30 mg at 10/30/20 0555    pantoprazole (PROTONIX) tablet 40 mg  40 mg Oral DAILY Jessi Hendrickson MD   40 mg at 10/30/20 0900    aspirin delayed-release tablet 81 mg  81 mg Oral DAILY Jessi Hendrickson MD   81 mg at 10/30/20 0934    atorvastatin (LIPITOR) tablet 20 mg  20 mg Oral QHS Jessi Hendrickson MD   20 mg at 10/29/20 2103    sodium chloride (NS) flush 5-40 mL  5-40 mL IntraVENous Q8H Jessi Hendrickson MD   Stopped at 10/30/20 1400    sodium chloride (NS) flush 5-40 mL  5-40 mL IntraVENous PRN Jessi Hendrickson MD        naloxone [facility]) injection 0.4 mg  0.4 mg IntraVENous PRN Jessi Hendrickson MD        senna-docusate (PERICOLACE) 8.6-50 mg per tablet 1 Tab  1 Tab Oral BID Jessi Hendrickson MD   1 Tab at 10/30/20 0934    polyethylene glycol (MIRALAX) packet 17 g  17 g Oral DAILY Jessi Hendrickson MD   17 g at 10/30/20 0933    bisacodyL (DULCOLAX) suppository 10 mg  10 mg Rectal DAILY PRN Jessi Hendrickson MD        benzocaine-menthoL (CEPACOL) lozenge 1 Lozenge  1 Lozenge Oral PRN Jessi Hendrickson MD        losartan (COZAAR) tablet 100 mg  100 mg Oral DAILY Jessi Hendrickson MD   100 mg at 10/30/20 5729    And    hydroCHLOROthiazide (HYDRODIURIL) tablet 12.5 mg  12.5 mg Oral DAILY Jessi Hendrickson MD   12.5 mg at 10/30/20 2266        No Known Allergies    Review of Systems:  Constitutional: negative for fevers, chills, sweats, and fatigue  Eyes: negative for visual disturbance, redness, and icterus  Ears, Nose, Mouth, Throat, and Face: negative for ear drainage, nasal congestion, and sore throat  Respiratory: Mild shortness of breath, denies cough  Cardiovascular: negative for chest pain, dyspnea, lower extremity edema, dyspnea on exertion  Gastrointestinal: negative for nausea, vomiting, diarrhea, and abdominal pain  Genitourinary:negative for frequency, dysuria, and hematuria  Integument/Breast: negative for rash, skin lesion(s), and dryness  Hematologic/Lymphatic: negative for bleeding, lymphadenopathy, and blood in stool or urine  Musculoskeletal:negative for arthralgias, neck pain, and+ back pain  Neurological: negative for headaches, dizziness, seizures, and weakness  Behavioral/Psychiatric: negative for anxiety and depression  Endocrine: negative for temperature intolerance and excessive urination  Allergic/Immunologic: negative for urticaria and angioedema    Objective:     Vitals:    10/30/20 0940 10/30/20 1123 10/30/20 1435 10/30/20 1622   BP: (!) 172/83 (!) 168/73 (!) 175/76 (!) 153/69   Pulse:  70 76    Resp:  18 18    Temp:  97.7 °F (36.5 °C) 97.9 °F (36.6 °C)    SpO2:  95% 95%    Weight:            Physical Exam:  Constitutional: Alert in no acute distress   HEENT: Sclerae anicteric, The neck is supple. Cardiovascular: Regular rate and rhythm. No murmurs, gallops, or rubs. Collette Ruts Respiratory: Diminished breath sounds bilaterally.    GI: Abdomen nondistended, soft, and nontender. Normal active bowel sounds. Rectal: Deferred   Musculoskeletal: 1+ lower extremity edema bilaterally. Extremities have good range of motion. Neurological:  Patient is alert and oriented. Cranial nerves II-XII intact  Psychiatric: Mood appears appropriate with judgement intact. Lymphatic: No cervical or supraclavicular adenopathy. Skin: No obvious hematoma at the site of the incision      Assessment:     Hospital Problems  Date Reviewed: 10/27/2020          Codes Class Noted POA    Lumbar stenosis with neurogenic claudication ICD-10-CM: M48.062  ICD-9-CM: 724.03  10/27/2020 Unknown            Impression\plan:    1. Back pain status post L2-L3 and L4-L5 laminectomies with patch repair of durotomies at both levels  2. Essential hypertension, uncontrolled  3. KENZIE resolving  4. Diabetes mellitus, type II  Plan:     Plan:    1. Back pain status post L2-L3 and L4-L5 laminectomies with patch repair of durotomies at both levels  Pain control per surgical services  Continue to express the importance of becoming mobile    2. Essential hypertension, uncontrolled  Increase Norvasc to 10 mg daily  Continue losartan hydrochlorothiazide and allopurinol  Lasix x1  Add labetalol for systolic pressure greater than 170    3. KENZIE resolving  Continue to monitor closely    4. Diabetes mellitus, type II  Adequate blood sugar control with the highest blood sugar observed is 148. Will place on sliding scale insulin for routine glucose checks. Chest x-ray to further evaluate for atelectasis    CBC and CMP in a.m.     CODE STATUS: Full    DVT prophylaxis: Per surgical services  Ulcer prophylaxis: Protonix    Time for evaluation 50 minutes    Signed By: Guy Carpio PA-C     October 30, 2020

## 2020-10-31 LAB
ALBUMIN SERPL-MCNC: 2.8 G/DL (ref 3.5–5)
ALBUMIN/GLOB SERPL: 0.7 {RATIO} (ref 1.1–2.2)
ALP SERPL-CCNC: 63 U/L (ref 45–117)
ALT SERPL-CCNC: 23 U/L (ref 12–78)
ANION GAP SERPL CALC-SCNC: 6 MMOL/L (ref 5–15)
AST SERPL W P-5'-P-CCNC: 23 U/L (ref 15–37)
BASOPHILS # BLD: 0 K/UL (ref 0–0.1)
BASOPHILS NFR BLD: 0 % (ref 0–1)
BILIRUB SERPL-MCNC: 0.8 MG/DL (ref 0.2–1)
BUN SERPL-MCNC: 16 MG/DL (ref 6–20)
BUN/CREAT SERPL: 17 (ref 12–20)
CA-I BLD-MCNC: 8.8 MG/DL (ref 8.5–10.1)
CHLORIDE SERPL-SCNC: 100 MMOL/L (ref 97–108)
CO2 SERPL-SCNC: 28 MMOL/L (ref 21–32)
CREAT SERPL-MCNC: 0.92 MG/DL (ref 0.7–1.3)
DIFFERENTIAL METHOD BLD: ABNORMAL
EOSINOPHIL # BLD: 0.2 K/UL (ref 0–0.4)
EOSINOPHIL NFR BLD: 2 % (ref 0–7)
ERYTHROCYTE [DISTWIDTH] IN BLOOD BY AUTOMATED COUNT: 13.3 % (ref 11.5–14.5)
GLOBULIN SER CALC-MCNC: 4.2 G/DL (ref 2–4)
GLUCOSE BLD STRIP.AUTO-MCNC: 115 MG/DL (ref 65–100)
GLUCOSE BLD STRIP.AUTO-MCNC: 123 MG/DL (ref 65–100)
GLUCOSE BLD STRIP.AUTO-MCNC: 129 MG/DL (ref 65–100)
GLUCOSE BLD STRIP.AUTO-MCNC: 130 MG/DL (ref 65–100)
GLUCOSE BLD STRIP.AUTO-MCNC: 131 MG/DL (ref 65–100)
GLUCOSE SERPL-MCNC: 117 MG/DL (ref 65–100)
HCT VFR BLD AUTO: 43.6 % (ref 36.6–50.3)
HGB BLD-MCNC: 14.9 G/DL (ref 12.1–17)
IMM GRANULOCYTES # BLD AUTO: 0 K/UL (ref 0–0.04)
IMM GRANULOCYTES NFR BLD AUTO: 0 % (ref 0–0.5)
LYMPHOCYTES # BLD: 4.1 K/UL (ref 0.8–3.5)
LYMPHOCYTES NFR BLD: 32 % (ref 12–49)
MCH RBC QN AUTO: 31.2 PG (ref 26–34)
MCHC RBC AUTO-ENTMCNC: 34.2 G/DL (ref 30–36.5)
MCV RBC AUTO: 91.2 FL (ref 80–99)
MONOCYTES # BLD: 1.3 K/UL (ref 0–1)
MONOCYTES NFR BLD: 10 % (ref 5–13)
NEUTS SEG # BLD: 7.3 K/UL (ref 1.8–8)
NEUTS SEG NFR BLD: 56 % (ref 32–75)
NRBC # BLD: 0 K/UL (ref 0–0.01)
NRBC BLD-RTO: 0 PER 100 WBC
PERFORMED BY, TECHID: ABNORMAL
PLATELET # BLD AUTO: 188 K/UL (ref 150–400)
PMV BLD AUTO: 11.9 FL (ref 8.9–12.9)
POTASSIUM SERPL-SCNC: 3.1 MMOL/L (ref 3.5–5.1)
PROT SERPL-MCNC: 7 G/DL (ref 6.4–8.2)
RBC # BLD AUTO: 4.78 M/UL (ref 4.1–5.7)
SODIUM SERPL-SCNC: 134 MMOL/L (ref 136–145)
WBC # BLD AUTO: 13 K/UL (ref 4.1–11.1)

## 2020-10-31 PROCEDURE — 74011250637 HC RX REV CODE- 250/637: Performed by: ORTHOPAEDIC SURGERY

## 2020-10-31 PROCEDURE — 94760 N-INVAS EAR/PLS OXIMETRY 1: CPT

## 2020-10-31 PROCEDURE — 74011250637 HC RX REV CODE- 250/637: Performed by: PHYSICIAN ASSISTANT

## 2020-10-31 PROCEDURE — 65270000029 HC RM PRIVATE

## 2020-10-31 PROCEDURE — 82962 GLUCOSE BLOOD TEST: CPT

## 2020-10-31 PROCEDURE — 77010033678 HC OXYGEN DAILY

## 2020-10-31 PROCEDURE — 85025 COMPLETE CBC W/AUTO DIFF WBC: CPT

## 2020-10-31 PROCEDURE — 36415 COLL VENOUS BLD VENIPUNCTURE: CPT

## 2020-10-31 PROCEDURE — 97530 THERAPEUTIC ACTIVITIES: CPT

## 2020-10-31 PROCEDURE — 80053 COMPREHEN METABOLIC PANEL: CPT

## 2020-10-31 RX ORDER — HYDRALAZINE HYDROCHLORIDE 10 MG/1
10 TABLET, FILM COATED ORAL 3 TIMES DAILY
Status: DISCONTINUED | OUTPATIENT
Start: 2020-10-31 | End: 2020-11-01

## 2020-10-31 RX ORDER — POTASSIUM CHLORIDE 20 MEQ/1
40 TABLET, EXTENDED RELEASE ORAL 2 TIMES DAILY
Status: COMPLETED | OUTPATIENT
Start: 2020-10-31 | End: 2020-11-01

## 2020-10-31 RX ADMIN — SENNOSIDES AND DOCUSATE SODIUM 1 TABLET: 8.6; 5 TABLET ORAL at 09:00

## 2020-10-31 RX ADMIN — OXYCODONE HYDROCHLORIDE 10 MG: 10 TABLET ORAL at 22:28

## 2020-10-31 RX ADMIN — PROPRANOLOL HYDROCHLORIDE 30 MG: 10 TABLET ORAL at 09:09

## 2020-10-31 RX ADMIN — ALLOPURINOL 300 MG: 300 TABLET ORAL at 22:27

## 2020-10-31 RX ADMIN — POTASSIUM CHLORIDE 40 MEQ: 1500 TABLET, EXTENDED RELEASE ORAL at 12:52

## 2020-10-31 RX ADMIN — LOSARTAN POTASSIUM 100 MG: 50 TABLET, FILM COATED ORAL at 09:01

## 2020-10-31 RX ADMIN — ASPIRIN 81 MG: 81 TABLET, COATED ORAL at 09:01

## 2020-10-31 RX ADMIN — HYDRALAZINE HYDROCHLORIDE 10 MG: 10 TABLET, FILM COATED ORAL at 15:47

## 2020-10-31 RX ADMIN — FENOFIBRATE 145 MG: 145 TABLET, FILM COATED ORAL at 22:27

## 2020-10-31 RX ADMIN — HYDRALAZINE HYDROCHLORIDE 10 MG: 10 TABLET, FILM COATED ORAL at 22:28

## 2020-10-31 RX ADMIN — Medication 10 ML: at 13:23

## 2020-10-31 RX ADMIN — SENNOSIDES AND DOCUSATE SODIUM 1 TABLET: 8.6; 5 TABLET ORAL at 22:27

## 2020-10-31 RX ADMIN — Medication 10 ML: at 22:39

## 2020-10-31 RX ADMIN — HYDROCHLOROTHIAZIDE 12.5 MG: 25 TABLET ORAL at 09:01

## 2020-10-31 RX ADMIN — GABAPENTIN 300 MG: 300 CAPSULE ORAL at 22:28

## 2020-10-31 RX ADMIN — ATORVASTATIN CALCIUM 20 MG: 20 TABLET, FILM COATED ORAL at 22:27

## 2020-10-31 RX ADMIN — BISACODYL 10 MG: 10 SUPPOSITORY RECTAL at 09:18

## 2020-10-31 RX ADMIN — METFORMIN HYDROCHLORIDE 500 MG: 500 TABLET ORAL at 09:01

## 2020-10-31 RX ADMIN — PANTOPRAZOLE SODIUM 40 MG: 20 TABLET, DELAYED RELEASE ORAL at 09:01

## 2020-10-31 RX ADMIN — GABAPENTIN 300 MG: 300 CAPSULE ORAL at 09:01

## 2020-10-31 RX ADMIN — Medication 10 ML: at 06:50

## 2020-10-31 RX ADMIN — MELOXICAM 15 MG: 7.5 TABLET ORAL at 09:09

## 2020-10-31 RX ADMIN — POLYETHYLENE GLYCOL 3350 17 G: 17 POWDER, FOR SOLUTION ORAL at 09:09

## 2020-10-31 RX ADMIN — AMLODIPINE BESYLATE 10 MG: 5 TABLET ORAL at 09:01

## 2020-10-31 RX ADMIN — POTASSIUM CHLORIDE 40 MEQ: 1500 TABLET, EXTENDED RELEASE ORAL at 22:27

## 2020-10-31 NOTE — PROGRESS NOTES
Hospitalist Progress Note    Subjective:   Daily Progress Note: 10/31/2020 10:33 AM    Still with short shallow breaths due to lying in the bed. Complains of back pain with movements only. Currently without back pain because he is laying flat in the bed.   Denies shortness of breath or chest pain    Current Facility-Administered Medications   Medication Dose Route Frequency    amLODIPine (NORVASC) tablet 10 mg  10 mg Oral DAILY    labetaloL (NORMODYNE;TRANDATE) 20 mg/4 mL (5 mg/mL) injection 20 mg  20 mg IntraVENous Q5MIN PRN    glucose chewable tablet 16 g  4 Tab Oral PRN    dextrose (D50W) injection syrg 12.5-25 g  25-50 mL IntraVENous PRN    glucagon (GLUCAGEN) injection 1 mg  1 mg IntraMUSCular PRN    insulin lispro (HUMALOG) injection   SubCUTAneous AC&HS    bisacodyL (DULCOLAX) suppository 10 mg  10 mg Rectal DAILY    cyclobenzaprine (FLEXERIL) tablet 10 mg  10 mg Oral TID PRN    oxyCODONE IR (ROXICODONE) tablet 5 mg  5 mg Oral Q4H PRN    oxyCODONE IR (ROXICODONE) tablet 10 mg  10 mg Oral Q4H PRN    acetaminophen (TYLENOL) tablet 650 mg  650 mg Oral Q4H PRN    fenofibrate nanocrystallized (TRICOR) tablet 145 mg  145 mg Oral QHS    pantoprazole (PROTONIX) tablet 40 mg  40 mg Oral DAILY    allopurinoL (ZYLOPRIM) tablet 300 mg  300 mg Oral QHS    gabapentin (NEURONTIN) capsule 300 mg  300 mg Oral BID    metFORMIN (GLUCOPHAGE) tablet 500 mg  500 mg Oral DAILY    meloxicam (MOBIC) tablet 15 mg  15 mg Oral DAILY    propranoloL (INDERAL) tablet 30 mg  30 mg Oral ACB    pantoprazole (PROTONIX) tablet 40 mg  40 mg Oral DAILY    aspirin delayed-release tablet 81 mg  81 mg Oral DAILY    atorvastatin (LIPITOR) tablet 20 mg  20 mg Oral QHS    sodium chloride (NS) flush 5-40 mL  5-40 mL IntraVENous Q8H    sodium chloride (NS) flush 5-40 mL  5-40 mL IntraVENous PRN    naloxone (NARCAN) injection 0.4 mg  0.4 mg IntraVENous PRN    senna-docusate (PERICOLACE) 8.6-50 mg per tablet 1 Tab  1 Tab Oral BID  polyethylene glycol (MIRALAX) packet 17 g  17 g Oral DAILY    benzocaine-menthoL (CEPACOL) lozenge 1 Lozenge  1 Lozenge Oral PRN    losartan (COZAAR) tablet 100 mg  100 mg Oral DAILY    And    hydroCHLOROthiazide (HYDRODIURIL) tablet 12.5 mg  12.5 mg Oral DAILY        Review of Systems  Review of Systems   Constitutional: Positive for malaise/fatigue. Negative for chills and fever. HENT: Negative. Eyes: Negative. Respiratory: Negative for cough, shortness of breath and wheezing. Cardiovascular: Positive for leg swelling. Negative for chest pain and palpitations. Gastrointestinal: Negative for abdominal pain, diarrhea and vomiting. Genitourinary: Negative. Musculoskeletal: Positive for back pain (with movement). Skin: Negative. Neurological: Negative for dizziness and headaches. Psychiatric/Behavioral: Negative. Objective:     Visit Vitals  BP (!) 171/79 (BP Patient Position: At rest)   Pulse 85   Temp 98.5 °F (36.9 °C)   Resp 20   Wt 127 kg (280 lb)   SpO2 93%   BMI 40.09 kg/m²    O2 Flow Rate (L/min): 2 l/min O2 Device: Nasal cannula    Temp (24hrs), Av.3 °F (36.8 °C), Min:97.7 °F (36.5 °C), Max:98.9 °F (37.2 °C)      No intake/output data recorded. 10/29 1901 - 10/31 0700  In: -   Out: 2750 [Urine:2750]    PHYSICAL EXAM:    Physical Exam  Vitals signs and nursing note reviewed. Constitutional:       General: He is not in acute distress. Appearance: Normal appearance. HENT:      Head: Normocephalic and atraumatic. Nose: Nose normal.      Mouth/Throat:      Mouth: Mucous membranes are moist.      Pharynx: Oropharynx is clear. Eyes:      Conjunctiva/sclera: Conjunctivae normal.   Neck:      Musculoskeletal: Normal range of motion and neck supple. Cardiovascular:      Rate and Rhythm: Normal rate and regular rhythm.    Pulmonary:      Effort: Pulmonary effort is normal.      Comments: Diminished bilaterally  Abdominal:      General: Bowel sounds are normal.      Palpations: Abdomen is soft. Musculoskeletal: Normal range of motion. Right lower leg: Edema present. Left lower leg: Edema present. Comments: 1+ lower extremity edema bilaterally   Neurological:      General: No focal deficit present. Mental Status: He is alert and oriented to person, place, and time. Mental status is at baseline. Psychiatric:      Comments: Sleepy          Data Review    Recent Results (from the past 24 hour(s))   GLUCOSE, POC    Collection Time: 10/30/20 12:45 PM   Result Value Ref Range    Glucose (POC) 121 (H) 65 - 100 mg/dL    Performed by Rudy BETANCUR    GLUCOSE, POC    Collection Time: 10/30/20  6:22 PM   Result Value Ref Range    Glucose (POC) 144 (H) 65 - 100 mg/dL    Performed by Rudy BETANCUR    GLUCOSE, POC    Collection Time: 10/30/20  9:40 PM   Result Value Ref Range    Glucose (POC) 141 (H) 65 - 100 mg/dL    Performed by [facility] JAZMIN    CBC WITH AUTOMATED DIFF    Collection Time: 10/31/20  6:06 AM   Result Value Ref Range    WBC 13.0 (H) 4.1 - 11.1 K/uL    RBC 4.78 4.10 - 5.70 M/uL    HGB 14.9 12.1 - 17.0 g/dL    HCT 43.6 36.6 - 50.3 %    MCV 91.2 80.0 - 99.0 FL    MCH 31.2 26.0 - 34.0 PG    MCHC 34.2 30.0 - 36.5 g/dL    RDW 13.3 11.5 - 14.5 %    PLATELET 888 465 - 924 K/uL    MPV 11.9 8.9 - 12.9 FL    NRBC 0.0 0  WBC    ABSOLUTE NRBC 0.00 0.00 - 0.01 K/uL    NEUTROPHILS 56 32 - 75 %    LYMPHOCYTES 32 12 - 49 %    MONOCYTES 10 5 - 13 %    EOSINOPHILS 2 0 - 7 %    BASOPHILS 0 0 - 1 %    IMMATURE GRANULOCYTES 0 0.0 - 0.5 %    ABS. NEUTROPHILS 7.3 1.8 - 8.0 K/UL    ABS. LYMPHOCYTES 4.1 (H) 0.8 - 3.5 K/UL    ABS. MONOCYTES 1.3 (H) 0.0 - 1.0 K/UL    ABS. EOSINOPHILS 0.2 0.0 - 0.4 K/UL    ABS. BASOPHILS 0.0 0.0 - 0.1 K/UL    ABS. IMM.  GRANS. 0.0 0.00 - 0.04 K/UL    DF AUTOMATED     METABOLIC PANEL, COMPREHENSIVE    Collection Time: 10/31/20  6:06 AM   Result Value Ref Range    Sodium 134 (L) 136 - 145 mmol/L    Potassium 3.1 (L) 3.5 - 5.1 mmol/L Chloride 100 97 - 108 mmol/L    CO2 28 21 - 32 mmol/L    Anion gap 6 5 - 15 mmol/L    Glucose 117 (H) 65 - 100 mg/dL    BUN 16 6 - 20 mg/dL    Creatinine 0.92 0.70 - 1.30 mg/dL    BUN/Creatinine ratio 17 12 - 20      GFR est AA >60 >60 ml/min/1.73m2    GFR est non-AA >60 >60 ml/min/1.73m2    Calcium 8.8 8.5 - 10.1 mg/dL    Bilirubin, total 0.8 0.2 - 1.0 mg/dL    AST (SGOT) 23 15 - 37 U/L    ALT (SGPT) 23 12 - 78 U/L    Alk. phosphatase 63 45 - 117 U/L    Protein, total 7.0 6.4 - 8.2 g/dL    Albumin 2.8 (L) 3.5 - 5.0 g/dL    Globulin 4.2 (H) 2.0 - 4.0 g/dL    A-G Ratio 0.7 (L) 1.1 - 2.2     GLUCOSE, POC    Collection Time: 10/31/20  8:29 AM   Result Value Ref Range    Glucose (POC) 115 (H) 65 - 100 mg/dL    Performed by Wendy Kwong         Assessment/Plan:     Active Problems:    Lumbar stenosis with neurogenic claudication (10/27/2020)      Impression\plan:     1. Back pain status post L2-L3 and L4-L5 laminectomies with patch repair of durotomies at both levels  2. Essential hypertension, uncontrolled  3. KENZIE resolving  4. Diabetes mellitus, type II  5. Hypokalemia    Plan:     1. Back pain status post L2-L3 and L4-L5 laminectomies with patch repair of durotomies at both levels  Pain control per surgical services  Continue to express the importance of becoming mobile and reducing risk of atelectasis and pneumonia postoperatively     2.  Essential hypertension, uncontrolled  Continue norvasc, increase to 10 mg on 10/31/2020   Continue losartan hydrochlorothiazide  and propanolol  Lasix x1 - on 10/30 with good urine output  Add labetalol for systolic pressure greater than 170  Pain control may be contributing to the patient's  elevated blood pressure or he was elevated prior to admission  Added hydralazine 3 times daily     3. KENZIE resolving  Resolved, creatinine 0.92     4. Diabetes mellitus, type II  Sliding scale insulin  Metformin  Good control     5.   Hypokalemia, moderate  Potassium 40 mg x 3 doses    6. Constipation  Dulcolax suppository  MiraLAX  Lactulose    7.   Protein malnutrition, mild  Albumin 2.6  Encourage p.o. intake    Chest x-ray normal     CBC: WNL and CMP: K 3.1     CODE STATUS: Full     DVT prophylaxis: SCD  Ulcer prophylaxis: Protonix     Time for evaluation 30 minutes      Care Plan discussed with: Patient/Family

## 2020-10-31 NOTE — PROGRESS NOTES
Problem: Mobility Impaired (Adult and Pediatric)  Goal: *Acute Goals and Plan of Care (Insert Text)  Description: Pt will be I with LE HEP in 7 days. Pt will perform bed mobility with mod I in 7 days. Pt will perform transfers with mod I in 7 days. Pt will amb 100-200 feet with LRAD safely with mod I in 7 days. Pt will ascend/descend 4 steps with B handrails and CGA in 7 days to safely enter home. Outcome: Progressing Towards Goal   PHYSICAL THERAPY TREATMENT  Patient: Samuel Erwin (4 y.o. male)  Date: 10/31/2020  Diagnosis: Spinal stenosis, lumbar region, with neurogenic claudication [M48.062]  Acquired spondylolisthesis [M43.10]  Lumbar stenosis with neurogenic claudication [M48.062]   <principal problem not specified>  Procedure(s) (LRB):  L2 - L3 & L4 - L5 Laminectomy, durotomies of both levels (N/A) 4 Days Post-Op  Precautions:    Chart, physical therapy assessment, plan of care and goals were reviewed. ASSESSMENT  Patient continues with skilled PT services and is progressing towards goals. Pt continued c PT services today. Pt was able to perform bed mob with Min A. He did require VC for proper sequencing and technique. Functional transfers were Min to Mod A and CGA for GT. He tolerated there ex well. He was able to transfer to commode with CGA. Current Level of Function Impacting Discharge (mobility/balance): dec functional mobility     Other factors to consider for discharge:          PLAN :  Patient continues to benefit from skilled intervention to address the above impairments. Continue treatment per established plan of care. to address goals.     Recommendation for discharge: (in order for the patient to meet his/her long term goals)  Therapy up to 5 days/week in SNF setting or intensive home health therapy program    This discharge recommendation:  Has been made in collaboration with the attending provider and/or case management    IF patient discharges home will need the following DME: rolling walker       SUBJECTIVE:   Patient stated pt stated he is doing well today. Feels like he needs has to do a BM.     OBJECTIVE DATA SUMMARY:   Critical Behavior:  Neurologic State: Alert  Orientation Level: Oriented X4  Cognition: Appropriate decision making     Functional Mobility Training:  Bed Mobility:  Rolling: Minimum assistance  Supine to Sit: Minimum assistance     Scooting: Minimum assistance        Transfers:  Sit to Stand: Moderate assistance  Stand to Sit: Minimum assistance                             Balance:  Sitting: Intact; With support  Ambulation/Gait Training:  Distance (ft): 75 Feet (ft)     Ambulation - Level of Assistance: Contact guard assistance                 Base of Support: Widened     Speed/Ninoska: Pace decreased (<100 feet/min)  Step Length: Left shortened;Right shortened                  No LOB noted with amb today. Ninoska improved as treatment progressed. Stairs: Therapeutic Exercises:   Seated LAQ, AP, and marching  2 x 10  Pain Ratin/10    Activity Tolerance:   tolerates ADLs without rest breaks and SpO2 stable on RA  Please refer to the flowsheet for vital signs taken during this treatment. After treatment patient left in no apparent distress:   Sitting in chair and Call bell within reach    COMMUNICATION/COLLABORATION:   The patients plan of care was discussed with: Physical therapist, Physician, and Case management.      Topher Goldstein   Time Calculation: 25 mins

## 2020-10-31 NOTE — PROGRESS NOTES
2 person skin assessment done by Marixa Leal RN and Amada Lisa RN. Patient has a midline incision on his back. Incision is clean, dry, and intact. Dressing changed with abdomen pad and medipore tape, No other skin issues at this time. Will continue to monitor.

## 2020-11-01 VITALS
SYSTOLIC BLOOD PRESSURE: 146 MMHG | DIASTOLIC BLOOD PRESSURE: 76 MMHG | OXYGEN SATURATION: 94 % | TEMPERATURE: 98.1 F | WEIGHT: 280 LBS | RESPIRATION RATE: 18 BRPM | BODY MASS INDEX: 40.09 KG/M2 | HEART RATE: 89 BPM

## 2020-11-01 DIAGNOSIS — M48.062 LUMBAR STENOSIS WITH NEUROGENIC CLAUDICATION: Primary | ICD-10-CM

## 2020-11-01 LAB
ALBUMIN SERPL-MCNC: 2.9 G/DL (ref 3.5–5)
ALBUMIN/GLOB SERPL: 0.7 {RATIO} (ref 1.1–2.2)
ALP SERPL-CCNC: 65 U/L (ref 45–117)
ALT SERPL-CCNC: 25 U/L (ref 12–78)
ANION GAP SERPL CALC-SCNC: 9 MMOL/L (ref 5–15)
AST SERPL W P-5'-P-CCNC: 23 U/L (ref 15–37)
BASOPHILS # BLD: 0 K/UL (ref 0–0.1)
BASOPHILS NFR BLD: 0 % (ref 0–1)
BILIRUB SERPL-MCNC: 0.8 MG/DL (ref 0.2–1)
BUN SERPL-MCNC: 22 MG/DL (ref 6–20)
BUN/CREAT SERPL: 21 (ref 12–20)
CA-I BLD-MCNC: 9 MG/DL (ref 8.5–10.1)
CHLORIDE SERPL-SCNC: 100 MMOL/L (ref 97–108)
CO2 SERPL-SCNC: 27 MMOL/L (ref 21–32)
CREAT SERPL-MCNC: 1.06 MG/DL (ref 0.7–1.3)
DIFFERENTIAL METHOD BLD: ABNORMAL
EOSINOPHIL # BLD: 0.2 K/UL (ref 0–0.4)
EOSINOPHIL NFR BLD: 2 % (ref 0–7)
ERYTHROCYTE [DISTWIDTH] IN BLOOD BY AUTOMATED COUNT: 13.3 % (ref 11.5–14.5)
GLOBULIN SER CALC-MCNC: 4.2 G/DL (ref 2–4)
GLUCOSE BLD STRIP.AUTO-MCNC: 134 MG/DL (ref 65–100)
GLUCOSE BLD STRIP.AUTO-MCNC: 157 MG/DL (ref 65–100)
GLUCOSE SERPL-MCNC: 109 MG/DL (ref 65–100)
HCT VFR BLD AUTO: 43.3 % (ref 36.6–50.3)
HGB BLD-MCNC: 15.2 G/DL (ref 12.1–17)
IMM GRANULOCYTES # BLD AUTO: 0 K/UL (ref 0–0.04)
IMM GRANULOCYTES NFR BLD AUTO: 0 % (ref 0–0.5)
LYMPHOCYTES # BLD: 4.5 K/UL (ref 0.8–3.5)
LYMPHOCYTES NFR BLD: 35 % (ref 12–49)
MCH RBC QN AUTO: 32 PG (ref 26–34)
MCHC RBC AUTO-ENTMCNC: 35.1 G/DL (ref 30–36.5)
MCV RBC AUTO: 91.2 FL (ref 80–99)
MONOCYTES # BLD: 1.4 K/UL (ref 0–1)
MONOCYTES NFR BLD: 11 % (ref 5–13)
NEUTS SEG # BLD: 6.7 K/UL (ref 1.8–8)
NEUTS SEG NFR BLD: 52 % (ref 32–75)
PERFORMED BY, TECHID: ABNORMAL
PERFORMED BY, TECHID: ABNORMAL
PLATELET # BLD AUTO: 217 K/UL (ref 150–400)
PMV BLD AUTO: 11.7 FL (ref 8.9–12.9)
POTASSIUM SERPL-SCNC: 3.8 MMOL/L (ref 3.5–5.1)
PROT SERPL-MCNC: 7.1 G/DL (ref 6.4–8.2)
RBC # BLD AUTO: 4.75 M/UL (ref 4.1–5.7)
SODIUM SERPL-SCNC: 136 MMOL/L (ref 136–145)
WBC # BLD AUTO: 12.8 K/UL (ref 4.1–11.1)

## 2020-11-01 PROCEDURE — 74011250637 HC RX REV CODE- 250/637: Performed by: PHYSICIAN ASSISTANT

## 2020-11-01 PROCEDURE — 36415 COLL VENOUS BLD VENIPUNCTURE: CPT

## 2020-11-01 PROCEDURE — 82962 GLUCOSE BLOOD TEST: CPT

## 2020-11-01 PROCEDURE — 80053 COMPREHEN METABOLIC PANEL: CPT

## 2020-11-01 PROCEDURE — 93005 ELECTROCARDIOGRAM TRACING: CPT

## 2020-11-01 PROCEDURE — 74011250637 HC RX REV CODE- 250/637: Performed by: ORTHOPAEDIC SURGERY

## 2020-11-01 PROCEDURE — 85025 COMPLETE CBC W/AUTO DIFF WBC: CPT

## 2020-11-01 PROCEDURE — 94760 N-INVAS EAR/PLS OXIMETRY 1: CPT

## 2020-11-01 PROCEDURE — 74011250636 HC RX REV CODE- 250/636: Performed by: PHYSICIAN ASSISTANT

## 2020-11-01 RX ORDER — HYDRALAZINE HYDROCHLORIDE 25 MG/1
25 TABLET, FILM COATED ORAL 3 TIMES DAILY
Status: DISCONTINUED | OUTPATIENT
Start: 2020-11-01 | End: 2020-11-01 | Stop reason: HOSPADM

## 2020-11-01 RX ADMIN — POLYETHYLENE GLYCOL 3350 17 G: 17 POWDER, FOR SOLUTION ORAL at 08:50

## 2020-11-01 RX ADMIN — POTASSIUM CHLORIDE 40 MEQ: 1500 TABLET, EXTENDED RELEASE ORAL at 08:51

## 2020-11-01 RX ADMIN — HYDROCHLOROTHIAZIDE 12.5 MG: 25 TABLET ORAL at 08:50

## 2020-11-01 RX ADMIN — BISACODYL 10 MG: 10 SUPPOSITORY RECTAL at 08:51

## 2020-11-01 RX ADMIN — SENNOSIDES AND DOCUSATE SODIUM 1 TABLET: 8.6; 5 TABLET ORAL at 08:50

## 2020-11-01 RX ADMIN — MELOXICAM 15 MG: 7.5 TABLET ORAL at 08:51

## 2020-11-01 RX ADMIN — PANTOPRAZOLE SODIUM 40 MG: 20 TABLET, DELAYED RELEASE ORAL at 08:51

## 2020-11-01 RX ADMIN — AMLODIPINE BESYLATE 10 MG: 5 TABLET ORAL at 08:51

## 2020-11-01 RX ADMIN — ASPIRIN 81 MG: 81 TABLET, COATED ORAL at 08:51

## 2020-11-01 RX ADMIN — LOSARTAN POTASSIUM 100 MG: 50 TABLET, FILM COATED ORAL at 08:51

## 2020-11-01 RX ADMIN — HYDRALAZINE HYDROCHLORIDE 10 MG: 10 TABLET, FILM COATED ORAL at 08:50

## 2020-11-01 RX ADMIN — METFORMIN HYDROCHLORIDE 500 MG: 500 TABLET ORAL at 08:50

## 2020-11-01 RX ADMIN — LABETALOL HYDROCHLORIDE 20 MG: 5 INJECTION, SOLUTION INTRAVENOUS at 04:00

## 2020-11-01 RX ADMIN — Medication 10 ML: at 14:00

## 2020-11-01 RX ADMIN — LACTULOSE 45 ML: 20 SOLUTION ORAL at 08:50

## 2020-11-01 RX ADMIN — GABAPENTIN 300 MG: 300 CAPSULE ORAL at 08:51

## 2020-11-01 RX ADMIN — PROPRANOLOL HYDROCHLORIDE 30 MG: 10 TABLET ORAL at 10:51

## 2020-11-01 NOTE — PROGRESS NOTES
Progress Note  Date:2020       Room:[facility]  Patient Name:Danny Iyer     YOB: 1945     Age:75 y.o. Subjective    Subjective    POD:    5 Days Post-Op  S/P:      Procedure(s):  L2 - L3 & L4 - L5 Laminectomy, durotomies of both levels     Patient has no complaints. However, he has not  Had a bowel movement since surgery. Yesterday he had scant bowel movement. Green catheter was removed yesterday. According to the nursing team, the patient is ambulating on the floor reasonably well with support, and is able to go to the bathroom, with support. Review of Systems  Objective         Vitals Last 24 Hours:  TEMPERATURE:  Temp  Av.6 °F (37 °C)  Min: 98.2 °F (36.8 °C)  Max: 99.2 °F (37.3 °C)  RESPIRATIONS RANGE: Resp  Av  Min: 18  Max: 20  PULSE OXIMETRY RANGE: SpO2  Av.9 %  Min: 93 %  Max: 96 %  PULSE RANGE: Pulse  Av.7  Min: 66  Max: 84  BLOOD PRESSURE RANGE: Systolic (28YBA), EYK:867 , Min:144 , HHA:003   ; Diastolic (90UPK), ABU:04, Min:68, Max:89    I/O (24Hr): Intake/Output Summary (Last 24 hours) at 2020 1002  Last data filed at 2020 0350  Gross per 24 hour   Intake --   Output 801 ml   Net -801 ml     Objective     EXAM:   Patient sitting up in his bedside armchair, being fed his breakfast by his nurse, and taking sips of water in between with his hands. No motor or sensory deficits noted. 2+ reflexes  Dressing clean, dry, intact. Calves NTTP bilaterally    Labs/Imaging/Diagnostics    Labs:  CBC:  Recent Labs     11/01/20  0220 10/31/20  0606   WBC 12.8* 13.0*   RBC 4.75 4.78   HGB 15.2 14.9   HCT 43.3 43.6   MCV 91.2 91.2   RDW 13.3 13.3    188     CHEMISTRIES:  Recent Labs     11/01/20  0220 10/31/20  0606    134*   K 3.8 3.1*    100   CO2 27 28   BUN 22* 16   CA 9.0 8.8   PT/INR:No results for input(s): INR, INREXT in the last 72 hours.     No lab exists for component: PROTIME  APTT:No results for input(s): APTT in the last 72 hours. LIVER PROFILE:  Recent Labs     11/01/20  0220 10/31/20  0606   AST 23 23   ALT 25 23     Lab Results   Component Value Date/Time    ALT (SGPT) 25 11/01/2020 02:20 AM    AST (SGOT) 23 11/01/2020 02:20 AM    Alk. phosphatase 65 11/01/2020 02:20 AM    Bilirubin, total 0.8 11/01/2020 02:20 AM       Imaging Last 24 Hours:  No results found. Assessment//Plan   Active Problems:    Lumbar stenosis with neurogenic claudication (10/27/2020)      Assessment & Plan     Continue PT/OT -  No headache noted. The patient is keen to go home. He informs me that he has 24/7 support at home for his ADLs and assistance. The nursing team suggested that the patient may be discharged after he has a bowel movement. Plan discharge home today, if patient has bowel movement and is comfortable with ADLs and home support.     Electronically signed by Luna Soto MD on 11/1/2020 at 10:02 AM

## 2020-11-01 NOTE — PROGRESS NOTES
Progress Note  Date:10/31/2020       Room:[facility]  Patient Deanne Bronson     YOB: 1945     Age:75 y.o. Subjective    Subjective POD:    4 Days Post-Op  S/P:      Procedure(s):  L2 - L3 & L4 - L5 Laminectomy, durotomies of both levels    Patient has no complaints. However, he has not  Had a bowel movement since surgery. He still has his Green catheter that was placed perioperatively. Review of Systems  Objective         Vitals Last 24 Hours:  TEMPERATURE:  Temp  Av.5 °F (36.9 °C)  Min: 98.2 °F (36.8 °C)  Max: 99.2 °F (37.3 °C)  RESPIRATIONS RANGE: Resp  Av.2  Min: 18  Max: 20  PULSE OXIMETRY RANGE: SpO2  Av.1 %  Min: 93 %  Max: 96 %  PULSE RANGE: Pulse  Av.2  Min: 66  Max: 85  BLOOD PRESSURE RANGE: Systolic (22LTO), NAHOMY:737 , Min:144 , KVJ:804   ; Diastolic (13CVP), YCL:39, Min:68, Max:80    I/O (24Hr): Intake/Output Summary (Last 24 hours) at 10/31/2020 2336  Last data filed at 10/31/2020 0730  Gross per 24 hour   Intake --   Output 810 ml   Net -810 ml     Objective   EXAM:   Patient lying in bed, with oxygen with nasal cannula. No motor or sensory deficits noted. 2+ reflexes   Dressing clean, dry, intact. Incision without erythema, induration, fluctuance. Calves NTTP bilaterally  Distal pulses palpable bilaterally. Labs/Imaging/Diagnostics    Labs:  CBC:  Recent Labs     10/31/20  0606 10/29/20  1021   WBC 13.0* 15.2*   RBC 4.78 4.45   HGB 14.9 14.2   HCT 43.6 42.1   MCV 91.2 94.6   RDW 13.3 14.0    144*     CHEMISTRIES:  Recent Labs     10/31/20  0606 10/29/20  1021   * 139   K 3.1* 3.7    106   CO2 28 27   BUN 16 15   CA 8.8 8.3*   PT/INR:No results for input(s): INR, INREXT in the last 72 hours. No lab exists for component: PROTIME  APTT:No results for input(s): APTT in the last 72 hours.   LIVER PROFILE:  Recent Labs     10/31/20  0606   AST 23   ALT 23     Lab Results   Component Value Date/Time ALT (SGPT) 23 10/31/2020 06:06 AM    AST (SGOT) 23 10/31/2020 06:06 AM    Alk. phosphatase 63 10/31/2020 06:06 AM    Bilirubin, total 0.8 10/31/2020 06:06 AM       Imaging Last 24 Hours:  No results found. Assessment//Plan   Active Problems:    Lumbar stenosis with neurogenic claudication (10/27/2020)      Assessment & Plan   Continue PT/OT - According to the patient and his nursing team, the patient has not been ambulatory, and has not been able to walk to the bathroom. No headache noted  Plan for D/C home or possible rehab 1-2 days depending on ambulation status. Green catheter was ordered to be discontinued today. Bowel protocol is being implemented. Oxygen with nasal cannula has been requested to be weaned off.     Electronically signed by Martha Mcdonald MD on 10/31/2020 at 11:36 PM

## 2020-11-01 NOTE — PROGRESS NOTES
Hospitalist Progress Note    Subjective:   Daily Progress Note: 11/1/2020 10:33 AM    Patient found upright in chair today. No complaints of shortness of breath, chest pain, headache or dizziness.   Patient had small bowel movement yesterday    Current Facility-Administered Medications   Medication Dose Route Frequency    hydrALAZINE (APRESOLINE) tablet 25 mg  25 mg Oral TID    lactulose (CHRONULAC) 10 gram/15 mL solution 45 mL  30 g Oral BID    amLODIPine (NORVASC) tablet 10 mg  10 mg Oral DAILY    labetaloL (NORMODYNE;TRANDATE) 20 mg/4 mL (5 mg/mL) injection 20 mg  20 mg IntraVENous Q5MIN PRN    glucose chewable tablet 16 g  4 Tab Oral PRN    dextrose (D50W) injection syrg 12.5-25 g  25-50 mL IntraVENous PRN    glucagon (GLUCAGEN) injection 1 mg  1 mg IntraMUSCular PRN    insulin lispro (HUMALOG) injection   SubCUTAneous AC&HS    bisacodyL (DULCOLAX) suppository 10 mg  10 mg Rectal DAILY    cyclobenzaprine (FLEXERIL) tablet 10 mg  10 mg Oral TID PRN    oxyCODONE IR (ROXICODONE) tablet 5 mg  5 mg Oral Q4H PRN    oxyCODONE IR (ROXICODONE) tablet 10 mg  10 mg Oral Q4H PRN    acetaminophen (TYLENOL) tablet 650 mg  650 mg Oral Q4H PRN    fenofibrate nanocrystallized (TRICOR) tablet 145 mg  145 mg Oral QHS    pantoprazole (PROTONIX) tablet 40 mg  40 mg Oral DAILY    allopurinoL (ZYLOPRIM) tablet 300 mg  300 mg Oral QHS    gabapentin (NEURONTIN) capsule 300 mg  300 mg Oral BID    metFORMIN (GLUCOPHAGE) tablet 500 mg  500 mg Oral DAILY    meloxicam (MOBIC) tablet 15 mg  15 mg Oral DAILY    propranoloL (INDERAL) tablet 30 mg  30 mg Oral ACB    aspirin delayed-release tablet 81 mg  81 mg Oral DAILY    atorvastatin (LIPITOR) tablet 20 mg  20 mg Oral QHS    sodium chloride (NS) flush 5-40 mL  5-40 mL IntraVENous Q8H    sodium chloride (NS) flush 5-40 mL  5-40 mL IntraVENous PRN    naloxone (NARCAN) injection 0.4 mg  0.4 mg IntraVENous PRN    senna-docusate (PERICOLACE) 8.6-50 mg per tablet 1 Tab  1 Tab Oral BID    polyethylene glycol (MIRALAX) packet 17 g  17 g Oral DAILY    benzocaine-menthoL (CEPACOL) lozenge 1 Lozenge  1 Lozenge Oral PRN    losartan (COZAAR) tablet 100 mg  100 mg Oral DAILY    And    hydroCHLOROthiazide (HYDRODIURIL) tablet 12.5 mg  12.5 mg Oral DAILY        Review of Systems  Review of Systems   Constitutional: Positive for malaise/fatigue. Negative for chills and fever. HENT: Negative. Eyes: Negative. Respiratory: Negative for cough, shortness of breath and wheezing. Cardiovascular: Positive for leg swelling. Negative for chest pain and palpitations. Gastrointestinal: Negative for abdominal pain, diarrhea and vomiting. Genitourinary: Negative. Musculoskeletal: Positive for back pain (with movement). Buttocks pain, bilaterally   Skin: Negative. Neurological: Negative for dizziness and headaches. Psychiatric/Behavioral: Negative. Objective:     Visit Vitals  BP (!) 163/76 (BP 1 Location: Right arm, BP Patient Position: At rest)   Pulse 82   Temp 98.3 °F (36.8 °C)   Resp 18   Wt 127 kg (280 lb)   SpO2 93%   BMI 40.09 kg/m²    O2 Flow Rate (L/min): 2 l/min O2 Device: Room air    Temp (24hrs), Av.6 °F (37 °C), Min:98.2 °F (36.8 °C), Max:99.2 °F (37.3 °C)      No intake/output data recorded. 10/30 1901 -  0700  In: -   Out: 8525 [Urine:1610]    PHYSICAL EXAM:    Physical Exam  Vitals signs and nursing note reviewed. Constitutional:       General: He is not in acute distress. Appearance: Normal appearance. HENT:      Head: Normocephalic and atraumatic. Nose: Nose normal.      Mouth/Throat:      Mouth: Mucous membranes are moist.      Pharynx: Oropharynx is clear. Eyes:      Conjunctiva/sclera: Conjunctivae normal.   Neck:      Musculoskeletal: Normal range of motion and neck supple. Cardiovascular:      Rate and Rhythm: Normal rate and regular rhythm.    Pulmonary:      Effort: Pulmonary effort is normal.      Breath sounds: Normal breath sounds. Comments: Diminished bilaterally  Abdominal:      General: Bowel sounds are normal.      Palpations: Abdomen is soft. Musculoskeletal: Normal range of motion. Right lower leg: Edema present. Left lower leg: Edema present. Comments: 1+ lower extremity edema bilaterally   Neurological:      General: No focal deficit present. Mental Status: He is alert and oriented to person, place, and time. Mental status is at baseline. Comments: Essential tremor bilateral hands   Psychiatric:      Comments: Sleepy          Data Review    Recent Results (from the past 24 hour(s))   GLUCOSE, POC    Collection Time: 10/31/20 11:42 AM   Result Value Ref Range    Glucose (POC) 129 (H) 65 - 100 mg/dL    Performed by Lupis Fraser    GLUCOSE, POC    Collection Time: 10/31/20  1:11 PM   Result Value Ref Range    Glucose (POC) 131 (H) 65 - 100 mg/dL    Performed by Rene Lin    GLUCOSE, POC    Collection Time: 10/31/20  4:47 PM   Result Value Ref Range    Glucose (POC) 123 (H) 65 - 100 mg/dL    Performed by Lupis Fraser    GLUCOSE, POC    Collection Time: 10/31/20  8:02 PM   Result Value Ref Range    Glucose (POC) 130 (H) 65 - 100 mg/dL    Performed by MATEUS MICHELLE    CBC WITH AUTOMATED DIFF    Collection Time: 11/01/20  2:20 AM   Result Value Ref Range    WBC 12.8 (H) 4.1 - 11.1 K/uL    RBC 4.75 4.10 - 5.70 M/uL    HGB 15.2 12.1 - 17.0 g/dL    HCT 43.3 36.6 - 50.3 %    MCV 91.2 80.0 - 99.0 FL    MCH 32.0 26.0 - 34.0 PG    MCHC 35.1 30.0 - 36.5 g/dL    RDW 13.3 11.5 - 14.5 %    PLATELET 316 000 - 600 K/uL    MPV 11.7 8.9 - 12.9 FL    NEUTROPHILS 52 32 - 75 %    LYMPHOCYTES 35 12 - 49 %    MONOCYTES 11 5 - 13 %    EOSINOPHILS 2 0 - 7 %    BASOPHILS 0 0 - 1 %    IMMATURE GRANULOCYTES 0 0.0 - 0.5 %    ABS. NEUTROPHILS 6.7 1.8 - 8.0 K/UL    ABS. LYMPHOCYTES 4.5 (H) 0.8 - 3.5 K/UL    ABS. MONOCYTES 1.4 (H) 0.0 - 1.0 K/UL    ABS. EOSINOPHILS 0.2 0.0 - 0.4 K/UL    ABS.  BASOPHILS 0.0 0.0 - 0.1 K/UL    ABS. IMM. GRANS. 0.0 0.00 - 0.04 K/UL    DF INACTIVE CODE. NEVER ACTIVATE/PH     METABOLIC PANEL, COMPREHENSIVE    Collection Time: 11/01/20  2:20 AM   Result Value Ref Range    Sodium 136 136 - 145 mmol/L    Potassium 3.8 3.5 - 5.1 mmol/L    Chloride 100 97 - 108 mmol/L    CO2 27 21 - 32 mmol/L    Anion gap 9 5 - 15 mmol/L    Glucose 109 (H) 65 - 100 mg/dL    BUN 22 (H) 6 - 20 mg/dL    Creatinine 1.06 0.70 - 1.30 mg/dL    BUN/Creatinine ratio 21 (H) 12 - 20      GFR est AA >60 >60 ml/min/1.73m2    GFR est non-AA >60 >60 ml/min/1.73m2    Calcium 9.0 8.5 - 10.1 mg/dL    Bilirubin, total 0.8 0.2 - 1.0 mg/dL    AST (SGOT) 23 15 - 37 U/L    ALT (SGPT) 25 12 - 78 U/L    Alk. phosphatase 65 45 - 117 U/L    Protein, total 7.1 6.4 - 8.2 g/dL    Albumin 2.9 (L) 3.5 - 5.0 g/dL    Globulin 4.2 (H) 2.0 - 4.0 g/dL    A-G Ratio 0.7 (L) 1.1 - 2.2     GLUCOSE, POC    Collection Time: 11/01/20  8:36 AM   Result Value Ref Range    Glucose (POC) 157 (H) 65 - 100 mg/dL    Performed by Ayden Alcantara         Assessment/Plan:     Active Problems:    Lumbar stenosis with neurogenic claudication (10/27/2020)      Impression\plan:     1. Back pain status post L2-L3 and L4-L5 laminectomies with patch repair of durotomies at both levels  2. Essential hypertension, uncontrolled  3. KENZIE resolving  4. Diabetes mellitus, type II  5. Hypokalemia, resolverd    Plan:     1.    Back pain status post L2-L3 and L4-L5 laminectomies with patch repair of durotomies at both levels  Pain control per surgical services  Continue to express the importance of becoming mobile and reducing risk of atelectasis and pneumonia postoperatively  Need to monitor for radiculopathy to the buttocks      2.  Essential hypertension, uncontrolled  Continue norvasc, increase to 10 mg on 10/31/2020   Continue losartan hydrochlorothiazide  and propanolol  Add labetalol for systolic pressure greater than 170  Added hydralazine 3 times daily increase to 25 mg on 11/1/2020     3. KENZIE resolving  Resolved     4. Diabetes mellitus, type II  Sliding scale insulin  Metformin  Good control     5. Hypokalemia, moderate  Resolved    6. Constipation  Dulcolax suppository  MiraLAX  Lactulose  Small bowel movement yesterday    7.   Protein malnutrition, mild  Albumin 2.6  Encourage p.o. intake    Chest x-ray normal     CBC: WNL and CMP: K 3.1     CODE STATUS: Full     DVT prophylaxis: SCD  Ulcer prophylaxis: Protonix     Time for evaluation 32 minutes      Care Plan discussed with: Patient/Family

## 2020-11-01 NOTE — PROGRESS NOTES
Problem: Hypertension  Goal: *Blood pressure within specified parameters  Outcome: Progressing Towards Goal     Problem: Pain  Goal: *Control of Pain  Outcome: Progressing Towards Goal

## 2020-11-01 NOTE — DISCHARGE INSTRUCTIONS
Patient Education        Lumbar Laminectomy: What to Expect at Home  Your Recovery  A lumbar laminectomy is surgery to ease pressure on the spinal cord and nerves of the lower spine. The doctor took out pieces of bone that were squeezing the spinal cord and nerves. You can expect your back to feel stiff or sore after surgery. This should improve in the weeks after surgery. You may have trouble sitting or standing in one position for very long and may need pain medicine in the weeks after your surgery. Your doctor may advise you to work with a physical therapist to strengthen the muscles around your spine and trunk. You will need to learn how to lift, twist, and bend so that you don't put too much strain on your back. This care sheet gives you a general idea about how long it will take for you to recover. But each person recovers at a different pace. Follow the steps below to get better as quickly as possible. How can you care for yourself at home? Activity    · Rest when you feel tired. Getting enough sleep will help you recover.     · Try to walk each day. Start by walking a little more than you did the day before. Bit by bit, increase the amount you walk. Walking boosts blood flow and helps prevent pneumonia and constipation. Walking may also decrease your muscle soreness after surgery.     · If advised by your doctor, you may need to avoid lifting anything that would cause excessive strain on your back. This may include a child, heavy grocery bags and milk containers, a heavy briefcase or backpack, cat litter or dog food bags, or a vacuum .     · Avoid strenuous activities, such as bicycle riding, jogging, weight lifting, or aerobic exercise, until your doctor says it is okay.     · Do not drive for 2 to 4 weeks after your surgery or until your doctor says it is okay.     · Avoid riding in a car for more than 30 minutes at a time for 2 to 4 weeks after surgery.  If you must ride in a car for a longer distance, stop often to walk and stretch your legs.     · Try to change your position about every 30 minutes while sitting or standing. This will help decrease your back pain while you are healing.     · You will probably need to take 4 to 6 weeks off from work. It depends on the type of work you do and how you feel.     · You may have sex as soon as you feel able, but avoid positions that put stress on your back or cause pain. Diet    · You can eat your normal diet. If your stomach is upset, try bland, low-fat foods like plain rice, broiled chicken, toast, and yogurt.     · Drink plenty of fluids (unless your doctor tells you not to).     · You may notice that your bowel movements are not regular right after your surgery. This is common. Try to avoid constipation and straining with bowel movements. You may want to take a fiber supplement every day. If you have not had a bowel movement after a couple of days, ask your doctor about taking a mild laxative. Medicines    · Your doctor will tell you if and when you can restart your medicines. He or she will also give you instructions about taking any new medicines.     · If you take aspirin or some other blood thinner, ask your doctor if and when to start taking it again. Make sure that you understand exactly what your doctor wants you to do.     · Take pain medicines exactly as directed. ? If the doctor gave you a prescription medicine for pain, take it as prescribed. ? If you are not taking a prescription pain medicine, ask your doctor if you can take an over-the-counter medicine.     · If your doctor prescribed antibiotics, take them as directed. Do not stop taking them just because you feel better. You need to take the full course of antibiotics.     · If you think your pain medicine is making you sick to your stomach:  ? Take your medicine after meals (unless your doctor has told you not to). ? Ask your doctor for a different pain medicine.    Incision care    · If you have strips of tape on the cut (incision) the doctor made, leave the tape on for a week or until it falls off.     · Wash the area daily with warm, soapy water and pat it dry.     · Keep the area clean and dry. You may cover it with a gauze bandage if it weeps or rubs against clothing. Change the bandage every day. Exercise    · Do back exercises as instructed by your doctor.     · Your doctor may advise you to work with a physical therapist to improve the strength and flexibility of your back. Other instructions    · To reduce stiffness and help sore muscles, use a warm water bottle, a heating pad set on low, or a warm cloth on your back. Do not put heat right over the incision. Do not go to sleep with a heating pad on your skin. Follow-up care is a key part of your treatment and safety. Be sure to make and go to all appointments, and call your doctor if you are having problems. It's also a good idea to know your test results and keep a list of the medicines you take. When should you call for help? Call 911 anytime you think you may need emergency care. For example, call if:    · You passed out (lost consciousness).     · You have sudden chest pain and shortness of breath, or you cough up blood.     · You are unable to move a leg at all. Call your doctor now or seek immediate medical care if:    · You have new or worse symptoms in your legs or buttocks. Symptoms may include:  ? Numbness or tingling. ? Weakness. ? Pain.     · You lose bladder or bowel control.     · You have loose stitches, or your incision comes open.     · You have blood or fluid draining from the incision.     · You have signs of infection, such as:  ? Increased pain, swelling, warmth, or redness. ? Pus draining from the incision. ? A fever. ? Red streaks leading from the incision.    Watch closely for changes in your health, and be sure to contact your doctor if:    · You do not have a bowel movement after taking a laxative.     · You are not getting better as expected. Where can you learn more? Go to http://www.gray.com/  Enter E493 in the search box to learn more about \"Lumbar Laminectomy: What to Expect at Home. \"  Current as of: March 2, 2020               Content Version: 12.6  © 4952-0001 [employer]. Care instructions adapted under license by [email or web address] (which disclaims liability or warranty for this information). If you have questions about a medical condition or this instruction, always ask your healthcare professional. [place] [address] any warranty or liability for your use of this information. Patient Education        Lumbar Laminectomy: What to Expect at Home  Your Recovery  A lumbar laminectomy is surgery to ease pressure on the spinal cord and nerves of the lower spine. The doctor took out pieces of bone that were squeezing the spinal cord and nerves. You can expect your back to feel stiff or sore after surgery. This should improve in the weeks after surgery. You may have trouble sitting or standing in one position for very long and may need pain medicine in the weeks after your surgery. Your doctor may advise you to work with a physical therapist to strengthen the muscles around your spine and trunk. You will need to learn how to lift, twist, and bend so that you don't put too much strain on your back. This care sheet gives you a general idea about how long it will take for you to recover. But each person recovers at a different pace. Follow the steps below to get better as quickly as possible. How can you care for yourself at home? Activity    · Rest when you feel tired. Getting enough sleep will help you recover.     · Try to walk each day. Start by walking a little more than you did the day before. Bit by bit, increase the amount you walk. Walking boosts blood flow and helps prevent pneumonia and constipation.  Walking may also decrease your muscle soreness after surgery.     · If advised by your doctor, you may need to avoid lifting anything that would cause excessive strain on your back. This may include a child, heavy grocery bags and milk containers, a heavy briefcase or backpack, cat litter or dog food bags, or a vacuum .     · Avoid strenuous activities, such as bicycle riding, jogging, weight lifting, or aerobic exercise, until your doctor says it is okay.     · Do not drive for 2 to 4 weeks after your surgery or until your doctor says it is okay.     · Avoid riding in a car for more than 30 minutes at a time for 2 to 4 weeks after surgery. If you must ride in a car for a longer distance, stop often to walk and stretch your legs.     · Try to change your position about every 30 minutes while sitting or standing. This will help decrease your back pain while you are healing.     · You will probably need to take 4 to 6 weeks off from work. It depends on the type of work you do and how you feel.     · You may have sex as soon as you feel able, but avoid positions that put stress on your back or cause pain. Diet    · You can eat your normal diet. If your stomach is upset, try bland, low-fat foods like plain rice, broiled chicken, toast, and yogurt.     · Drink plenty of fluids (unless your doctor tells you not to).     · You may notice that your bowel movements are not regular right after your surgery. This is common. Try to avoid constipation and straining with bowel movements. You may want to take a fiber supplement every day. If you have not had a bowel movement after a couple of days, ask your doctor about taking a mild laxative. Medicines    · Your doctor will tell you if and when you can restart your medicines. He or she will also give you instructions about taking any new medicines.     · If you take aspirin or some other blood thinner, ask your doctor if and when to start taking it again.  Make sure that you understand exactly what your doctor wants you to do.     · Take pain medicines exactly as directed. ? If the doctor gave you a prescription medicine for pain, take it as prescribed. ? If you are not taking a prescription pain medicine, ask your doctor if you can take an over-the-counter medicine.     · If your doctor prescribed antibiotics, take them as directed. Do not stop taking them just because you feel better. You need to take the full course of antibiotics.     · If you think your pain medicine is making you sick to your stomach:  ? Take your medicine after meals (unless your doctor has told you not to). ? Ask your doctor for a different pain medicine. Incision care    · If you have strips of tape on the cut (incision) the doctor made, leave the tape on for a week or until it falls off.     · Wash the area daily with warm, soapy water and pat it dry.     · Keep the area clean and dry. You may cover it with a gauze bandage if it weeps or rubs against clothing. Change the bandage every day. Exercise    · Do back exercises as instructed by your doctor.     · Your doctor may advise you to work with a physical therapist to improve the strength and flexibility of your back. Other instructions    · To reduce stiffness and help sore muscles, use a warm water bottle, a heating pad set on low, or a warm cloth on your back. Do not put heat right over the incision. Do not go to sleep with a heating pad on your skin. Follow-up care is a key part of your treatment and safety. Be sure to make and go to all appointments, and call your doctor if you are having problems. It's also a good idea to know your test results and keep a list of the medicines you take. When should you call for help? Call 911 anytime you think you may need emergency care.  For example, call if:    · You passed out (lost consciousness).     · You have sudden chest pain and shortness of breath, or you cough up blood.     · You are unable to move a leg at all. Call your doctor now or seek immediate medical care if:    · You have new or worse symptoms in your legs or buttocks. Symptoms may include:  ? Numbness or tingling. ? Weakness. ? Pain.     · You lose bladder or bowel control.     · You have loose stitches, or your incision comes open.     · You have blood or fluid draining from the incision.     · You have signs of infection, such as:  ? Increased pain, swelling, warmth, or redness. ? Pus draining from the incision. ? A fever. ? Red streaks leading from the incision. Watch closely for changes in your health, and be sure to contact your doctor if:    · You do not have a bowel movement after taking a laxative.     · You are not getting better as expected. Where can you learn more? Go to http://www.gray.com/  Enter U005 in the search box to learn more about \"Lumbar Laminectomy: What to Expect at Home. \"  Current as of: March 2, 2020               Content Version: 12.6  © 2006-2020 [employer]. Care instructions adapted under license by [employer] (which disclaims liability or warranty for this information). If you have questions about a medical condition or this instruction, always ask your healthcare professional. [place] [address] any warranty or liability for your use of this information.

## 2020-11-01 NOTE — PROGRESS NOTES
Patient was accepted to [facility]. CM will continue to follow patient. ADDENDUM  Patient is being discharged today and will be receiving [facility]. CM reviewed IMM with patient. CM completed discharge checklist with Christophe SYLVESTER.

## 2020-11-02 LAB
ATRIAL RATE: 83 BPM
CALCULATED P AXIS, ECG09: 25 DEGREES
CALCULATED R AXIS, ECG10: -56 DEGREES
CALCULATED T AXIS, ECG11: 2 DEGREES
DIAGNOSIS, 93000: NORMAL
P-R INTERVAL, ECG05: 150 MS
Q-T INTERVAL, ECG07: 354 MS
QRS DURATION, ECG06: 112 MS
QTC CALCULATION (BEZET), ECG08: 415 MS
VENTRICULAR RATE, ECG03: 83 BPM

## 2022-03-19 PROBLEM — M48.062 LUMBAR STENOSIS WITH NEUROGENIC CLAUDICATION: Status: ACTIVE | Noted: 2020-10-27

## 2022-09-07 ENCOUNTER — HOSPITAL ENCOUNTER (OUTPATIENT)
Dept: WOUND CARE | Age: 77
Discharge: HOME | End: 2022-09-07
Payer: MEDICARE

## 2022-09-07 VITALS
HEIGHT: 71 IN | WEIGHT: 285 LBS | RESPIRATION RATE: 18 BRPM | BODY MASS INDEX: 39.9 KG/M2 | DIASTOLIC BLOOD PRESSURE: 76 MMHG | TEMPERATURE: 96.9 F | SYSTOLIC BLOOD PRESSURE: 162 MMHG | HEART RATE: 78 BPM

## 2022-09-07 DIAGNOSIS — L97.812 NON-PRESSURE CHRONIC ULCER OF OTHER PART OF RIGHT LOWER LEG WITH FAT LAYER EXPOSED (HCC): Primary | ICD-10-CM

## 2022-09-07 PROCEDURE — 99214 OFFICE O/P EST MOD 30 MIN: CPT

## 2022-09-07 PROCEDURE — 74011000250 HC RX REV CODE- 250: Performed by: SPECIALIST

## 2022-09-07 PROCEDURE — 11045 DBRDMT SUBQ TISS EACH ADDL: CPT

## 2022-09-07 PROCEDURE — 74011250637 HC RX REV CODE- 250/637: Performed by: SPECIALIST

## 2022-09-07 PROCEDURE — 11042 DBRDMT SUBQ TIS 1ST 20SQCM/<: CPT

## 2022-09-07 RX ORDER — LIDOCAINE HYDROCHLORIDE 20 MG/ML
15 SOLUTION OROPHARYNGEAL ONCE
Status: CANCELLED | OUTPATIENT
Start: 2022-09-07 | End: 2022-09-07

## 2022-09-07 RX ORDER — LIDOCAINE HYDROCHLORIDE 20 MG/ML
15 SOLUTION OROPHARYNGEAL ONCE
Status: DISCONTINUED | OUTPATIENT
Start: 2022-09-07 | End: 2022-09-08 | Stop reason: HOSPADM

## 2022-09-07 RX ORDER — SILVER SULFADIAZINE 10 G/1000G
CREAM TOPICAL ONCE
Status: CANCELLED | OUTPATIENT
Start: 2022-09-07 | End: 2022-09-07

## 2022-09-07 RX ORDER — MUPIROCIN 20 MG/G
OINTMENT TOPICAL ONCE
Status: CANCELLED | OUTPATIENT
Start: 2022-09-07 | End: 2022-09-07

## 2022-09-07 RX ORDER — TRIAMCINOLONE ACETONIDE 1 MG/G
OINTMENT TOPICAL ONCE
Status: CANCELLED | OUTPATIENT
Start: 2022-09-07 | End: 2022-09-07

## 2022-09-07 RX ORDER — TAMSULOSIN HYDROCHLORIDE 0.4 MG/1
0.4 CAPSULE ORAL DAILY
COMMUNITY
End: 2022-09-21

## 2022-09-07 RX ORDER — LIDOCAINE HYDROCHLORIDE 20 MG/ML
15 SOLUTION OROPHARYNGEAL AS NEEDED
Status: DISCONTINUED | OUTPATIENT
Start: 2022-09-07 | End: 2022-09-09 | Stop reason: HOSPADM

## 2022-09-07 RX ORDER — TRIAMCINOLONE ACETONIDE 1 MG/G
OINTMENT TOPICAL 2 TIMES DAILY
Status: DISCONTINUED | OUTPATIENT
Start: 2022-09-07 | End: 2022-09-09 | Stop reason: HOSPADM

## 2022-09-07 NOTE — WOUND CARE
[address]   Progress Note and Procedure Note     [address] [facility] RECORD NUMBER:  [id number]  AGE: 68 y.o. RACE WHITE/NON-  GENDER: male  : 1945  EPISODE DATE:  2022    Subjective:   40-year-old diabetic male presents with a several month history of drainage and ulceration from his right lower extremity. He has a history of lymphedema. He does not wear compression stockings. He was gifted a lymphedema pump, but has not been using it. He was started on oral antibiotics by his PCP, taking 3 times a day, but the name of the antibiotic is not known to the patient. Chief Complaint   Patient presents with    Wound Check     Right leg         HISTORY of PRESENT ILLNESS HPI    Jeremiah Bazan is a 68 y.o. male who presents today for wound/ulcer evaluation.    History of Wound Context: RLE  Wound/Ulcer Pain Timing/Severity: none  Quality of pain: dull  Severity:  0 / 10   Modifying Factors: None  Associated Signs/Symptoms: edema    Ulcer Identification:  Ulcer Type: venous    Contributing Factors: lymphedema    Wound: RLE         PAST MEDICAL HISTORY    Past Medical History:   Diagnosis Date    Arthritis     Cancer ([facility] [address]     colon,skin    Diabetes ([facility] [address]     GERD (gastroesophageal reflux disease)     Gout     Hyperlipidemia     Hypertension     Ill-defined condition     obesity   BMI= 35.9 om 2016        PAST SURGICAL HISTORY    Past Surgical History:   Procedure Laterality Date    HX BACK SURGERY      HX COLONOSCOPY      HX ENDOSCOPY      HX HEENT Bilateral     Lasik    HX KNEE ARTHROSCOPY      HX MALIGNANT SKIN LESION EXCISION      HX ORTHOPAEDIC Left     shoulder reconstruction and replacement    HX OTHER SURGICAL      brain surgery for tremors    HX TOTAL COLECTOMY         FAMILY HISTORY    Family History   Problem Relation Age of Onset    Lupus Mother     Diabetes Father     Hypertension Father     Lupus Sister        SOCIAL HISTORY    Social History Tobacco Use    Smoking status: Former     Types: Cigarettes     Quit date:      Years since quittin.7    Smokeless tobacco: Never   Substance Use Topics    Alcohol use: Never    Drug use: No       ALLERGIES    No Known Allergies    MEDICATIONS    Current Outpatient Medications on File Prior to Encounter   Medication Sig Dispense Refill    tamsulosin (FLOMAX) 0.4 mg capsule Take 0.4 mg by mouth daily. pantoprazole (PROTONIX) 40 mg tablet Take 40 mg by mouth daily. aspirin delayed-release 81 mg tablet Take 81 mg by mouth daily. fenofibrate nanocrystallized (TRICOR) 145 mg tablet Take 145 mg by mouth nightly. felodipine (PLENDIL SR) 10 mg 24 hr tablet Take 10 mg by mouth daily. allopurinol (ZYLOPRIM) 300 mg tablet Take 300 mg by mouth nightly. telmisartan-hydroCHLOROthiazide (MICARDIS HCT) 80-12.5 mg per tablet Take 1 Tab by mouth daily. gabapentin (NEURONTIN) 300 mg capsule Take 300 mg by mouth two (2) times a day. metFORMIN (GLUCOPHAGE) 500 mg tablet Take 500 mg by mouth daily. lovastatin (MEVACOR) 20 mg tablet Take 20 mg by mouth nightly. meloxicam (MOBIC) 15 mg tablet Take 15 mg by mouth daily. No current facility-administered medications on file prior to encounter. REVIEW OF SYSTEMS    Pertinent items are noted in HPI.     Objective:     Visit Vitals  BP (!) 162/76 (BP 1 Location: Right arm, BP Patient Position: At rest;Sitting)   Pulse 78   Temp 96.9 °F (36.1 °C)   Resp 18   Ht 5' 11\" (1.803 m)   Wt 129.3 kg (285 lb)   BMI 39.75 kg/m²       Wt Readings from Last 3 Encounters:   22 129.3 kg (285 lb)   10/27/20 127 kg (280 lb)   10/06/20 125.8 kg (277 lb 6.4 oz)       PHYSICAL EXAM  Patient had moderate swelling of bilateral lower extremities consistent with lymphedema, on the medial right distal calf there is a superficial ulceration consistent with venous ulceration, with drainage but no evidence of active infection    Pertinent items are noted in HPI. Assessment:    Venous ulcer RLE    Problem List Items Addressed This Visit       Non-pressure chronic ulcer of other part of right lower leg with fat layer exposed ([address] - Primary    Relevant Medications    tamsulosin (FLOMAX) 0.4 mg capsule    Other Relevant Orders    DUPLEX LOWER EXT VENOUS REFLUX BILAT    LOWER EXT ART PVR MULT LEVEL SEG PRESSURES       Wound Leg Right #1 09/07/22 (Active)   Wound Image   09/07/22 0941   Wound Etiology Venous 09/07/22 0941   Dressing Status Other (Comment) 09/07/22 0941   Cleansed Cleansed with saline 09/07/22 0941   Wound Length (cm) 7.5 cm 09/07/22 0941   Wound Width (cm) 5 cm 09/07/22 0941   Wound Depth (cm) 0.1 cm 09/07/22 0941   Wound Surface Area (cm^2) 37.5 cm^2 09/07/22 0941   Wound Volume (cm^3) 3.75 cm^3 09/07/22 0941   Post-Procedure Length (cm) 7.5 cm 09/07/22 1012   Post-Procedure Width (cm) 5 cm 09/07/22 1012   Post-Procedure Depth (cm) 0.1 cm 09/07/22 1012   Post-Procedure Surface Area (cm^2) 37.5 cm^2 09/07/22 1012   Post-Procedure Volume (cm^3) 3.75 cm^3 09/07/22 1012   Wound Assessment Granulation tissue;Slough 09/07/22 0941   Drainage Amount None 09/07/22 0941   Wound Odor None 09/07/22 0941   Angeles-Wound/Incision Assessment Dry/flaky 09/07/22 0941   Edges Attached edges 09/07/22 0941   Wound Thickness Description Partial thickness 09/07/22 0941   Number of days: 0       POP IN PROCEDURE TYPE (DEBRIDEMENT, BIOPSY, I&D, SKIN SUB, CHEMICAL CAUERTY)     Plan:   Silvercel, drawtex, double tubi   Plan unna subsequent to vascular lab studies  Vascular studies    Treatment Note please see attached Discharge Instructions    Written patient dismissal instructions given to patient or POA.          Electronically signed by Parminder Diehl MD on 9/7/2022 at 10:26 AM               Debridement Wound Care        Problem List Items Addressed This Visit       Non-pressure chronic ulcer of other part of right lower leg with fat layer exposed ([address] - Primary    Relevant Medications    tamsulosin (FLOMAX) 0.4 mg capsule    Other Relevant Orders    DUPLEX LOWER EXT VENOUS REFLUX BILAT    LOWER EXT ART PVR MULT LEVEL SEG PRESSURES       Procedure Note  Indications:  Based on my examination of this patient's wound(s)/ulcer(s) today, debridement is required to promote healing and evaluate the wound base.     Performed by: Alejandro Washington MD    Consent obtained: Yes    Time out taken: Yes    Debridement: Excisional    Using curette the wound(s)/ulcer(s) was/were sharply debrided down through and including the removal of    subcutaneous tissue    Devitalized Tissue Debrided: slough    Pre Debridement Measurements:  Are located in the [place]  Documentation Flow Sheet    Non-Pressure ulcer, fat layer exposed    Wound/Ulcer #: 1    Post Debridement Measurements:  Wound/Ulcer Descriptions are Pre Debridement except measurements:    Wound Leg Right #1 09/07/22 (Active)   Wound Image   09/07/22 0941   Wound Etiology Venous 09/07/22 0941   Dressing Status Other (Comment) 09/07/22 0941   Cleansed Cleansed with saline 09/07/22 0941   Wound Length (cm) 7.5 cm 09/07/22 0941   Wound Width (cm) 5 cm 09/07/22 0941   Wound Depth (cm) 0.1 cm 09/07/22 0941   Wound Surface Area (cm^2) 37.5 cm^2 09/07/22 0941   Wound Volume (cm^3) 3.75 cm^3 09/07/22 0941   Post-Procedure Length (cm) 7.5 cm 09/07/22 1012   Post-Procedure Width (cm) 5 cm 09/07/22 1012   Post-Procedure Depth (cm) 0.1 cm 09/07/22 1012   Post-Procedure Surface Area (cm^2) 37.5 cm^2 09/07/22 1012   Post-Procedure Volume (cm^3) 3.75 cm^3 09/07/22 1012   Wound Assessment Granulation tissue;Slough 09/07/22 0941   Drainage Amount None 09/07/22 0941   Wound Odor None 09/07/22 0941   Angeles-Wound/Incision Assessment Dry/flaky 09/07/22 0941   Edges Attached edges 09/07/22 0941   Wound Thickness Description Partial thickness 09/07/22 0941   Number of days: 0        Total Surface Area Debrided:  10 sq cm     Estimated Blood Loss:  None    Hemostasis Achieved: Pressure    Procedural Pain: 0 / 10     Post Procedural Pain: 0 / 10     Response to treatment: Well tolerated by patient

## 2022-09-07 NOTE — WOUND CARE
Discharge Instructions for  [address] [place] [address], [address] [place] [address]  Telephone: [phone]    NAME:  Marni Barry  YOB: 1945  MEDICAL RECORD NUMBER:  [id number]  DATE:  9/7/2022      Return Appointment:  [] Dressing supply provider:   [x] Home Healthcare: [facility]   [x] Return Appointment:   1  Week(s)  [] Nurse Visit:  Week(s)    [] Discharge from [facility]  [x] Ordered tests: venous and arterial studies at [facility]  [] Referrals:   [] Rx:   [x] Other:  patient has lymphedema pumps    Wound Cleansing:   Do not scrub or use excessive force. Cleanse wound prior to applying a clean dressing with:  [x] Normal Saline   [x] Mild Soap & Water/Baby Shampoo   [] Keep Wound Dry in Shower  [] Other:      Topical Treatments:  Do not apply lotions, creams, or ointments to wound bed unless directed. [] Apply moisturizing lotion to skin surrounding the wound prior to dressing change.  [] Apply antifungal ointment to skin surrounding the wound prior to dressing change.  [] Apply thin film of moisture barrier/zinc ointment to skin immediately around wound. [] Other:       Dressings:           Wound Location  Right lower leg  [] Apply Primary Dressing:       [] MediHoney Gel    [] MediHoney Alginate               [] Calcium Alginate      [x] Calcium Alginate with Silver   [] Collagen                   [] Collagen with Silver                [] Santyl with Moistened saline gauze              [] Hydrofera Blue (cut to size and moistened)  [] Hydrofera Blue Ready (Cut to size)   [] NS wet to dry    [] Betadine wet to dry              [] Hydrogel                [] Mepitel     [] Bactroban/Mupirocin               [] Other:     [x] Cover and Secure with:     [x] Gauze [] Dali [x] Kerlix   [] Foam [] Super Absorbant [] ABD     [] ACE Wrap            [x] Other: drawtex   Limit contact of tape with skin.     [x] Change dressing: [] Daily    [] Every Other Day [] Once per week   [] Twice per week   [x] Three times per week [] Do Not Change Dressing   [] Other:      Pressure Relief:  [] When sitting, shift position or do seat lifts every 15 minutes.  [] Wheelchair cushion [] Specialty Bed/Mattress  [] Turn every 2 hours when in bed. Avoid direct pressure on wound site. Limit side lying to 30 degree tilt. Limit HOB elevation to 30 degrees. Edema Control:  Apply: [] Compression Stocking:   mmHg  []Right Leg []Left Leg   [x] Tubigrip [x]Right Leg Double Layer []Left Leg Double Layer      []Right Leg Single Layer []Left Leg Single Layer     [x] Elevate leg(s) above the level of the heart when sitting. [x] Avoid prolonged standing in one place. Compression:  Apply: [] Multilayer Compression Wrap: []RightLeg []Left Leg                                 []Coflex   []Coflex Lite             []Unna     [] Do not get leg(s) with wrap wet. If wraps become too tight call the center or completely remove the wrap. [] Elevate leg(s) above the level of the heart when sitting. [] Avoid prolonged standing in one place.     Off-Loading:   [] Off-loading when [] walking  [] in bed [] sitting  [] Total non-weight bearing  [] Right Leg  [] Left Leg   [] Assistive Device [] Walker [] Cane      [] Wheelchair  [] Crutches   [] Surgical shoe    [] Podus Boot(s)   [] Foam Boot(s)  [] Roll About    [] Cast Boot [] CROW Boot  [] Wedge Shoe  [] Other:  Dietary:  [] Diet as tolerated: [x] Calorie Diabetic Diet: [] No Added Salt:  [] Increase Protein: [] Other:     Activity:  [x] Activity as tolerated:    [] Patient has no activity restrictions       [] Strict Bedrest:   [] Remain off Work:       [] May return to full duty work:                                     [] Return to work with restrictions:               [address] Information: Should you experience any significant changes in your wound(s) or have questions about your wound care, please contact [facility] [place] at [place] [address] 8:00 am - 4:30. If you need help with your wound outside of these hours and cannot wait until we are again available, contact your PCP or go to the hospital emergency room. PLEASE NOTE: IF YOU ARE UNABLE TO OBTAIN WOUND SUPPLIES, CONTINUE TO USE THE SUPPLIES YOU HAVE AVAILABLE UNTIL YOU ARE ABLE TO REACH US. IT IS MOST IMPORTANT TO KEEP THE WOUND COVERED AT ALL TIMES.     [x] Dr. Leigh Ann Benito   [] Dr. Sujata Parks  [] Dr. Gardner Gosselin

## 2022-09-07 NOTE — WOUND CARE
[address]   Progress Note and Procedure Note     [address] RECORD NUMBER:  [id number]  AGE: 68 y.o. RACE WHITE/NON-  GENDER: male  : 1945  EPISODE DATE:  2022    Subjective:   C/o L leg swelling and pain, no fever    Chief Complaint   Patient presents with    Wound Check     Right leg         HISTORY of PRESENT ILLNESS HPI    Joy Moon is a 68 y.o. male who presents today for wound/ulcer evaluation.    History of Wound Context: LLE  Wound/Ulcer Pain Timing/Severity: mild  Quality of pain: aching  Severity:  2 / 10   Modifying Factors: None  Associated Signs/Symptoms: edema    Ulcer Identification:  Ulcer Type: non-healing surgical    Contributing Factors: diabetes    Wound: LLE         PAST MEDICAL HISTORY    Past Medical History:   Diagnosis Date    Arthritis     Cancer ([facility]     colon,skin    Diabetes ([facility]     GERD (gastroesophageal reflux disease)     Gout     Hyperlipidemia     Hypertension     Ill-defined condition     obesity   BMI= 35.9 om 2016        PAST SURGICAL HISTORY    Past Surgical History:   Procedure Laterality Date    HX BACK SURGERY      HX COLONOSCOPY      HX ENDOSCOPY      HX HEENT Bilateral     Lasik    HX KNEE ARTHROSCOPY      HX MALIGNANT SKIN LESION EXCISION      HX ORTHOPAEDIC Left     shoulder reconstruction and replacement    HX OTHER SURGICAL      brain surgery for tremors    HX TOTAL COLECTOMY         FAMILY HISTORY    Family History   Problem Relation Age of Onset    Lupus Mother     Diabetes Father     Hypertension Father     Lupus Sister        SOCIAL HISTORY    Social History     Tobacco Use    Smoking status: Former     Types: Cigarettes     Quit date:      Years since quittin.7    Smokeless tobacco: Never   Substance Use Topics    Alcohol use: Never    Drug use: No       ALLERGIES    No Known Allergies    MEDICATIONS    Current Outpatient Medications on File Prior to Encounter   Medication Sig Dispense Refill    tamsulosin (FLOMAX) 0.4 mg capsule Take 0.4 mg by mouth daily. pantoprazole (PROTONIX) 40 mg tablet Take 40 mg by mouth daily. aspirin delayed-release 81 mg tablet Take 81 mg by mouth daily. fenofibrate nanocrystallized (TRICOR) 145 mg tablet Take 145 mg by mouth nightly. felodipine (PLENDIL SR) 10 mg 24 hr tablet Take 10 mg by mouth daily. allopurinol (ZYLOPRIM) 300 mg tablet Take 300 mg by mouth nightly. telmisartan-hydroCHLOROthiazide (MICARDIS HCT) 80-12.5 mg per tablet Take 1 Tab by mouth daily. gabapentin (NEURONTIN) 300 mg capsule Take 300 mg by mouth two (2) times a day. metFORMIN (GLUCOPHAGE) 500 mg tablet Take 500 mg by mouth daily. lovastatin (MEVACOR) 20 mg tablet Take 20 mg by mouth nightly. meloxicam (MOBIC) 15 mg tablet Take 15 mg by mouth daily. No current facility-administered medications on file prior to encounter. REVIEW OF SYSTEMS    Pertinent items are noted in HPI. Objective:     Visit Vitals  BP (!) 162/76 (BP 1 Location: Right arm, BP Patient Position: At rest;Sitting)   Pulse 78   Temp 96.9 °F (36.1 °C)   Resp 18   Ht 5' 11\" (1.803 m)   Wt 129.3 kg (285 lb)   BMI 39.75 kg/m²       Wt Readings from Last 3 Encounters:   09/07/22 129.3 kg (285 lb)   10/27/20 127 kg (280 lb)   10/06/20 125.8 kg (277 lb 6.4 oz)       PHYSICAL EXAM  Measurements not significantly changed, tunneling persists  Medial periwound inflammation    Pertinent items are noted in HPI.     Assessment:    Little change    Problem List Items Addressed This Visit       Non-pressure chronic ulcer of other part of right lower leg with fat layer exposed ([address] - Primary    Relevant Medications    tamsulosin (FLOMAX) 0.4 mg capsule    Other Relevant Orders    DUPLEX LOWER EXT VENOUS REFLUX BILAT    LOWER EXT ART PVR MULT LEVEL SEG PRESSURES       Wound Leg Right #1 09/07/22 (Active)   Wound Image   09/07/22 0941   Wound Etiology Venous 09/07/22 0941 Dressing Status Other (Comment) 09/07/22 0941   Cleansed Cleansed with saline 09/07/22 0941   Wound Length (cm) 7.5 cm 09/07/22 0941   Wound Width (cm) 5 cm 09/07/22 0941   Wound Depth (cm) 0.1 cm 09/07/22 0941   Wound Surface Area (cm^2) 37.5 cm^2 09/07/22 0941   Wound Volume (cm^3) 3.75 cm^3 09/07/22 0941   Post-Procedure Length (cm) 7.5 cm 09/07/22 1012   Post-Procedure Width (cm) 5 cm 09/07/22 1012   Post-Procedure Depth (cm) 0.1 cm 09/07/22 1012   Post-Procedure Surface Area (cm^2) 37.5 cm^2 09/07/22 1012   Post-Procedure Volume (cm^3) 3.75 cm^3 09/07/22 1012   Wound Assessment Granulation tissue;Slough 09/07/22 0941   Drainage Amount None 09/07/22 0941   Wound Odor None 09/07/22 0941   Angeles-Wound/Incision Assessment Dry/flaky 09/07/22 0941   Edges Attached edges 09/07/22 0941   Wound Thickness Description Partial thickness 09/07/22 0941   Number of days: 0       POP IN PROCEDURE TYPE (DEBRIDEMENT, BIOPSY, I&D, SKIN SUB, CHEMICAL CAUERTY)     Plan:   Add jess to hydrofera blue  Triamcinalone to inlamed periwound skin  Lymphedema pump    Treatment Note please see attached Discharge Instructions    Written patient dismissal instructions given to patient or POA. Electronically signed by Beto Alfonso MD on 9/7/2022 at 10:35 AM               Debridement Wound Care        Problem List Items Addressed This Visit       Non-pressure chronic ulcer of other part of right lower leg with fat layer exposed ([address] - Primary    Relevant Medications    tamsulosin (FLOMAX) 0.4 mg capsule    Other Relevant Orders    DUPLEX LOWER EXT VENOUS REFLUX BILAT    LOWER EXT ART PVR MULT LEVEL SEG PRESSURES       Procedure Note  Indications:  Based on my examination of this patient's wound(s)/ulcer(s) today, debridement is required to promote healing and evaluate the wound base.     Performed by: Beto Alfonso MD    Consent obtained: Yes    Time out taken: Yes    Debridement: Excisional    Using curette the wound(s)/ulcer(s) was/were sharply debrided down through and including the removal of    subcutaneous tissue    Devitalized Tissue Debrided: slough    Pre Debridement Measurements:  Are located in the [place]  Documentation Flow Sheet    Non-Pressure ulcer, fat layer exposed    Wound/Ulcer #: 1    Post Debridement Measurements:  Wound/Ulcer Descriptions are Pre Debridement except measurements:    Wound Leg Right #1 09/07/22 (Active)   Wound Image   09/07/22 0941   Wound Etiology Venous 09/07/22 0941   Dressing Status Other (Comment) 09/07/22 0941   Cleansed Cleansed with saline 09/07/22 0941   Wound Length (cm) 7.5 cm 09/07/22 0941   Wound Width (cm) 5 cm 09/07/22 0941   Wound Depth (cm) 0.1 cm 09/07/22 0941   Wound Surface Area (cm^2) 37.5 cm^2 09/07/22 0941   Wound Volume (cm^3) 3.75 cm^3 09/07/22 0941   Post-Procedure Length (cm) 7.5 cm 09/07/22 1012   Post-Procedure Width (cm) 5 cm 09/07/22 1012   Post-Procedure Depth (cm) 0.1 cm 09/07/22 1012   Post-Procedure Surface Area (cm^2) 37.5 cm^2 09/07/22 1012   Post-Procedure Volume (cm^3) 3.75 cm^3 09/07/22 1012   Wound Assessment Granulation tissue;Slough 09/07/22 0941   Drainage Amount None 09/07/22 0941   Wound Odor None 09/07/22 0941   Angeles-Wound/Incision Assessment Dry/flaky 09/07/22 0941   Edges Attached edges 09/07/22 0941   Wound Thickness Description Partial thickness 09/07/22 0941   Number of days: 0        Total Surface Area Debrided:  37.5 sq cm     Estimated Blood Loss:  Minimal     Hemostasis Achieved: Pressure    Procedural Pain: 0 / 10     Post Procedural Pain: 0 / 10     Response to treatment: Well tolerated by patient

## 2022-09-07 NOTE — DISCHARGE INSTRUCTIONS
Discharge Instructions for  [address] [place] [address], [facility]  Telephone: [phone]    NAME:  Vika Zarate  YOB: 1945  MEDICAL RECORD NUMBER:  [id number]  DATE:  9/7/2022      Return Appointment:  [] Dressing supply provider:   [x] Home Healthcare: [facility]   [x] Return Appointment:   1  Week(s)  [] Nurse Visit:  Week(s)    [] Discharge from [facility]  [x] Ordered tests: venous and arterial studies at [facility]  [] Referrals:   [] Rx:   [x] Other:  patient has lymphedema pumps    Wound Cleansing:   Do not scrub or use excessive force. Cleanse wound prior to applying a clean dressing with:  [x] Normal Saline   [x] Mild Soap & Water/Baby Shampoo   [] Keep Wound Dry in Shower  [] Other:      Topical Treatments:  Do not apply lotions, creams, or ointments to wound bed unless directed. [] Apply moisturizing lotion to skin surrounding the wound prior to dressing change.  [] Apply antifungal ointment to skin surrounding the wound prior to dressing change.  [] Apply thin film of moisture barrier/zinc ointment to skin immediately around wound. [] Other:       Dressings:           Wound Location  Right lower leg  [] Apply Primary Dressing:       [] MediHoney Gel    [] MediHoney Alginate               [] Calcium Alginate      [x] Calcium Alginate with Silver   [] Collagen                   [] Collagen with Silver                [] Santyl with Moistened saline gauze              [] Hydrofera Blue (cut to size and moistened)  [] Hydrofera Blue Ready (Cut to size)   [] NS wet to dry    [] Betadine wet to dry              [] Hydrogel                [] Mepitel     [] Bactroban/Mupirocin               [] Other:     [x] Cover and Secure with:     [x] Gauze [] Dali [x] Kerlix   [] Foam [] Super Absorbant [] ABD     [] ACE Wrap            [x] Other: drawtex   Limit contact of tape with skin.     [x] Change dressing: [] Daily    [] Every Other Day [] Once per week   [] Twice per week   [x] Three times per week [] Do Not Change Dressing   [] Other:      Pressure Relief:  [] When sitting, shift position or do seat lifts every 15 minutes.  [] Wheelchair cushion [] Specialty Bed/Mattress  [] Turn every 2 hours when in bed. Avoid direct pressure on wound site. Limit side lying to 30 degree tilt. Limit HOB elevation to 30 degrees. Edema Control:  Apply: [] Compression Stocking:   mmHg  []Right Leg []Left Leg   [x] Tubigrip [x]Right Leg Double Layer []Left Leg Double Layer      []Right Leg Single Layer []Left Leg Single Layer     [x] Elevate leg(s) above the level of the heart when sitting. [x] Avoid prolonged standing in one place. Compression:  Apply: [] Multilayer Compression Wrap: []RightLeg []Left Leg                                 []Coflex   []Coflex Lite             []Unna     [] Do not get leg(s) with wrap wet. If wraps become too tight call the center or completely remove the wrap. [] Elevate leg(s) above the level of the heart when sitting. [] Avoid prolonged standing in one place.     Off-Loading:   [] Off-loading when [] walking  [] in bed [] sitting  [] Total non-weight bearing  [] Right Leg  [] Left Leg   [] Assistive Device [] Walker [] Cane      [] Wheelchair  [] Crutches   [] Surgical shoe    [] Podus Boot(s)   [] Foam Boot(s)  [] Roll About    [] Cast Boot [] CROW Boot  [] Wedge Shoe  [] Other:  Dietary:  [] Diet as tolerated: [x] Calorie Diabetic Diet: [] No Added Salt:  [] Increase Protein: [] Other:     Activity:  [x] Activity as tolerated:    [] Patient has no activity restrictions       [] Strict Bedrest:   [] Remain off Work:       [] May return to full duty work:                                     [] Return to work with restrictions:               [address] Information: Should you experience any significant changes in your wound(s) or have questions about your wound care, please contact [facility] [place] at [place] [phone] 8:00 am - 4:30. If you need help with your wound outside of these hours and cannot wait until we are again available, contact your PCP or go to the hospital emergency room. PLEASE NOTE: IF YOU ARE UNABLE TO OBTAIN WOUND SUPPLIES, CONTINUE TO USE THE SUPPLIES YOU HAVE AVAILABLE UNTIL YOU ARE ABLE TO REACH US. IT IS MOST IMPORTANT TO KEEP THE WOUND COVERED AT ALL TIMES.     [x] Dr. Jonah Rea   [] Dr. Sukhdeep Borja  [] Dr. Zepeda Scot

## 2022-09-14 ENCOUNTER — HOSPITAL ENCOUNTER (OUTPATIENT)
Dept: WOUND CARE | Age: 77
Discharge: HOME | End: 2022-09-14
Payer: MEDICARE

## 2022-09-14 VITALS
HEART RATE: 73 BPM | SYSTOLIC BLOOD PRESSURE: 176 MMHG | TEMPERATURE: 98.3 F | RESPIRATION RATE: 18 BRPM | DIASTOLIC BLOOD PRESSURE: 74 MMHG

## 2022-09-14 DIAGNOSIS — L97.812 NON-PRESSURE CHRONIC ULCER OF OTHER PART OF RIGHT LOWER LEG WITH FAT LAYER EXPOSED (HCC): Primary | ICD-10-CM

## 2022-09-14 PROCEDURE — 74011000250 HC RX REV CODE- 250: Performed by: SPECIALIST

## 2022-09-14 PROCEDURE — 99213 OFFICE O/P EST LOW 20 MIN: CPT

## 2022-09-14 RX ORDER — LIDOCAINE HYDROCHLORIDE 20 MG/ML
15 SOLUTION OROPHARYNGEAL ONCE
Status: COMPLETED | OUTPATIENT
Start: 2022-09-14 | End: 2022-09-14

## 2022-09-14 RX ORDER — SILVER SULFADIAZINE 10 G/1000G
CREAM TOPICAL ONCE
Status: CANCELLED | OUTPATIENT
Start: 2022-09-14 | End: 2022-09-14

## 2022-09-14 RX ORDER — TRIAMCINOLONE ACETONIDE 1 MG/G
OINTMENT TOPICAL ONCE
Status: CANCELLED | OUTPATIENT
Start: 2022-09-14 | End: 2022-09-14

## 2022-09-14 RX ORDER — MUPIROCIN 20 MG/G
OINTMENT TOPICAL ONCE
Status: CANCELLED | OUTPATIENT
Start: 2022-09-14 | End: 2022-09-14

## 2022-09-14 RX ORDER — LIDOCAINE HYDROCHLORIDE 20 MG/ML
15 SOLUTION OROPHARYNGEAL ONCE
Status: CANCELLED | OUTPATIENT
Start: 2022-09-14 | End: 2022-09-14

## 2022-09-14 RX ADMIN — LIDOCAINE HYDROCHLORIDE 15 ML: 20 SOLUTION ORAL; TOPICAL at 09:54

## 2022-09-14 NOTE — DISCHARGE INSTRUCTIONS
[address] [place] [address], [address] [place] [address]  Telephone: [phone]    NAME:  Edward Zhang  YOB: 1945  MEDICAL RECORD NUMBER:  [id number]  DATE:  9/14/2022      Return Appointment:  [] Dressing supply provider:   [x] [facility]: [facility]  [x] Return Appointment:   1  Week(s)  [] Nurse Visit:  Week(s)    [] Discharge from [facility]  [x] Ordered tests: venous and arterial studies at [facility]  [] Referrals:   [] Rx:   [x] Other:  patient has lymphedema pumps    Wound Cleansing:   Do not scrub or use excessive force. Cleanse wound prior to applying a clean dressing with:  [x] Normal Saline   [x] Mild Soap & Water/Baby Shampoo   [] Keep Wound Dry in Shower  [] Other:      Topical Treatments:  Do not apply lotions, creams, or ointments to wound bed unless directed. [] Apply moisturizing lotion to skin surrounding the wound prior to dressing change.  [] Apply antifungal ointment to skin surrounding the wound prior to dressing change.  [] Apply thin film of moisture barrier/zinc ointment to skin immediately around wound. [] Other:       Dressings:           Wound Location  Right lower leg  [] Apply Primary Dressing:       [] MediHoney Gel    [] MediHoney Alginate               [] Calcium Alginate      [x] Calcium Alginate with Silver   [] Collagen                   [] Collagen with Silver                [] Santyl with Moistened saline gauze              [] Hydrofera Blue (cut to size and moistened)  [] Hydrofera Blue Ready (Cut to size)   [] NS wet to dry    [] Betadine wet to dry              [] Hydrogel                [] Mepitel     [] Bactroban/Mupirocin               [] Other:     [x] Cover and Secure with:     [x] Gauze [] Dali [x] Kerlix   [] Foam [] Super Absorbant [] ABD     [] ACE Wrap            [x] Other: drawtex   Limit contact of tape with skin.     [x] Change dressing: [] Daily    [] Every Other Day [] Once per week   [] Twice per week   [x] Three times per week [] Do Not Change Dressing   [] Other:      Pressure Relief:  [] When sitting, shift position or do seat lifts every 15 minutes.  [] Wheelchair cushion [] Specialty Bed/Mattress  [] Turn every 2 hours when in bed. Avoid direct pressure on wound site. Limit side lying to 30 degree tilt. Limit HOB elevation to 30 degrees. Edema Control:  Apply: [] Compression Stocking:   mmHg  []Right Leg []Left Leg   [x] Tubigrip [x]Right Leg Double Layer [x]Left Leg Double Layer      []Right Leg Single Layer []Left Leg Single Layer     [x] Elevate leg(s) above the level of the heart when sitting. [x] Avoid prolonged standing in one place. Compression:  Apply: [] Multilayer Compression Wrap: []RightLeg []Left Leg                                 []Coflex   []Coflex Lite             []Unna     [] Do not get leg(s) with wrap wet. If wraps become too tight call the center or completely remove the wrap. [] Elevate leg(s) above the level of the heart when sitting. [] Avoid prolonged standing in one place.     Off-Loading:   [] Off-loading when [] walking  [] in bed [] sitting  [] Total non-weight bearing  [] Right Leg  [] Left Leg   [] Assistive Device [] Walker [] Cane      [] Wheelchair  [] Crutches   [] Surgical shoe    [] Podus Boot(s)   [] Foam Boot(s)  [] Roll About    [] Cast Boot [] CROW Boot  [] Wedge Shoe  [] Other:  Dietary:  [] Diet as tolerated: [x] Calorie Diabetic Diet: [] No Added Salt:  [] Increase Protein: [] Other:     Activity:  [x] Activity as tolerated:    [] Patient has no activity restrictions       [] Strict Bedrest:   [] Remain off Work:       [] May return to full duty work:                                     [] Return to work with restrictions:               [address] Information: Should you experience any significant changes in your wound(s) or have questions about your wound care, please contact Ge[facility] Sosa at [address] [facility]  FRIDAY 8:00 am - 4:30. If you need help with your wound outside of these hours and cannot wait until we are again available, contact your PCP or go to the hospital emergency room. PLEASE NOTE: IF YOU ARE UNABLE TO OBTAIN WOUND SUPPLIES, CONTINUE TO USE THE SUPPLIES YOU HAVE AVAILABLE UNTIL YOU ARE ABLE TO REACH US. IT IS MOST IMPORTANT TO KEEP THE WOUND COVERED AT ALL TIMES.     [x] Dr. Shannan Bang   [] Dr. Elizabeth Turner  [] Dr. Duong Forget

## 2022-09-14 NOTE — WOUND CARE
Discharge Instructions for  [address] [place] [address], [address] [place] [address]  Telephone: [phone]    NAME:  Yary Cruz  YOB: 1945  MEDICAL RECORD NUMBER:  [id number]  DATE:  9/14/2022      Return Appointment:  [] Dressing supply provider:   [x] Home Healthcare: Reba Lee  [x] Return Appointment:   1  Week(s)  [] Nurse Visit:  Week(s)    [] Discharge from [facility]  [x] Ordered tests: venous and arterial studies at [facility]  [] Referrals:   [] Rx:   [x] Other:  patient has lymphedema pumps    Wound Cleansing:   Do not scrub or use excessive force. Cleanse wound prior to applying a clean dressing with:  [x] Normal Saline   [x] Mild Soap & Water/Baby Shampoo   [] Keep Wound Dry in Shower  [] Other:      Topical Treatments:  Do not apply lotions, creams, or ointments to wound bed unless directed. [] Apply moisturizing lotion to skin surrounding the wound prior to dressing change.  [] Apply antifungal ointment to skin surrounding the wound prior to dressing change.  [] Apply thin film of moisture barrier/zinc ointment to skin immediately around wound. [] Other:       Dressings:           Wound Location  Right lower leg  [] Apply Primary Dressing:       [] MediHoney Gel    [] MediHoney Alginate               [] Calcium Alginate      [x] Calcium Alginate with Silver   [] Collagen                   [] Collagen with Silver                [] Santyl with Moistened saline gauze              [] Hydrofera Blue (cut to size and moistened)  [] Hydrofera Blue Ready (Cut to size)   [] NS wet to dry    [] Betadine wet to dry              [] Hydrogel                [] Mepitel     [] Bactroban/Mupirocin               [] Other:     [x] Cover and Secure with:     [x] Gauze [] Dali [x] Kerlix   [] Foam [] Super Absorbant [] ABD     [] ACE Wrap            [x] Other: drawtex   Limit contact of tape with skin.     [x] Change dressing: [] Daily    [] Every Other Day [] Once per week   [] Twice per week   [x] Three times per week [] Do Not Change Dressing   [] Other:      Pressure Relief:  [] When sitting, shift position or do seat lifts every 15 minutes.  [] Wheelchair cushion [] Specialty Bed/Mattress  [] Turn every 2 hours when in bed. Avoid direct pressure on wound site. Limit side lying to 30 degree tilt. Limit HOB elevation to 30 degrees. Edema Control:  Apply: [] Compression Stocking:   mmHg  []Right Leg []Left Leg   [x] Tubigrip [x]Right Leg Double Layer [x]Left Leg Double Layer      []Right Leg Single Layer []Left Leg Single Layer     [x] Elevate leg(s) above the level of the heart when sitting. [x] Avoid prolonged standing in one place. Compression:  Apply: [] Multilayer Compression Wrap: []RightLeg []Left Leg                                 []Coflex   []Coflex Lite             []Unna     [] Do not get leg(s) with wrap wet. If wraps become too tight call the center or completely remove the wrap. [] Elevate leg(s) above the level of the heart when sitting. [] Avoid prolonged standing in one place.     Off-Loading:   [] Off-loading when [] walking  [] in bed [] sitting  [] Total non-weight bearing  [] Right Leg  [] Left Leg   [] Assistive Device [] Walker [] Cane      [] Wheelchair  [] Crutches   [] Surgical shoe    [] Podus Boot(s)   [] Foam Boot(s)  [] Roll About    [] Cast Boot [] CROW Boot  [] Wedge Shoe  [] Other:  Dietary:  [] Diet as tolerated: [x] Calorie Diabetic Diet: [] No Added Salt:  [] Increase Protein: [] Other:     Activity:  [x] Activity as tolerated:    [] Patient has no activity restrictions       [] Strict Bedrest:   [] Remain off Work:       [] May return to full duty work:                                     [] Return to work with restrictions:               [address] Information: Should you experience any significant changes in your wound(s) or have questions about your wound care, please contact Geovanni Sosa at [id number] 8:00 am - 4:30. If you need help with your wound outside of these hours and cannot wait until we are again available, contact your PCP or go to the hospital emergency room. PLEASE NOTE: IF YOU ARE UNABLE TO OBTAIN WOUND SUPPLIES, CONTINUE TO USE THE SUPPLIES YOU HAVE AVAILABLE UNTIL YOU ARE ABLE TO REACH US. IT IS MOST IMPORTANT TO KEEP THE WOUND COVERED AT ALL TIMES.     [x] Dr. Nereida Reaves   [] Dr. Ruthy Aranda  [] Dr. Yelena Gilliam

## 2022-09-14 NOTE — WOUND CARE
[address]   Progress Note and Procedure Note   NO Procedure      [address] [facility] RECORD NUMBER:  [id number]  AGE: 68 y.o. RACE WHITE/NON-  GENDER: male  : 1945  EPISODE DATE:  2022    Subjective:   Minimal drainage, no pain on  Has not yet had vascular studies performed    Chief Complaint   Patient presents with    Wound Check     Right leg         HISTORY of PRESENT ILLNESS HPI    Joy Moon is a 68 y.o. male who presents today for wound/ulcer evaluation.    History of Wound Context: RLE  Wound/Ulcer Pain Timing/Severity: mild  Quality of pain: dull  Severity:  1 / 10   Modifying Factors: None  Associated Signs/Symptoms: edema    Ulcer Identification:  Ulcer Type: venous    Contributing Factors: edema    Wound: RLE         PAST MEDICAL HISTORY    Past Medical History:   Diagnosis Date    Arthritis     Cancer ([facility] [address]     colon,skin    Diabetes ([facility] [address]     GERD (gastroesophageal reflux disease)     Gout     Hyperlipidemia     Hypertension     Ill-defined condition     obesity   BMI= 35.9 om 2016        PAST SURGICAL HISTORY    Past Surgical History:   Procedure Laterality Date    HX BACK SURGERY      HX COLONOSCOPY      HX ENDOSCOPY      HX HEENT Bilateral     Lasik    HX KNEE ARTHROSCOPY      HX MALIGNANT SKIN LESION EXCISION      HX ORTHOPAEDIC Left     shoulder reconstruction and replacement    HX OTHER SURGICAL      brain surgery for tremors    HX TOTAL COLECTOMY         FAMILY HISTORY    Family History   Problem Relation Age of Onset    Lupus Mother     Diabetes Father     Hypertension Father     Lupus Sister        SOCIAL HISTORY    Social History     Tobacco Use    Smoking status: Former     Types: Cigarettes     Quit date:      Years since quittin.7    Smokeless tobacco: Never   Substance Use Topics    Alcohol use: Never    Drug use: No       ALLERGIES    No Known Allergies    MEDICATIONS    Current Outpatient Medications on File Prior to Encounter   Medication Sig Dispense Refill    tamsulosin (FLOMAX) 0.4 mg capsule Take 0.4 mg by mouth daily. pantoprazole (PROTONIX) 40 mg tablet Take 40 mg by mouth daily. aspirin delayed-release 81 mg tablet Take 81 mg by mouth daily. fenofibrate nanocrystallized (TRICOR) 145 mg tablet Take 145 mg by mouth nightly. felodipine (PLENDIL SR) 10 mg 24 hr tablet Take 10 mg by mouth daily. allopurinol (ZYLOPRIM) 300 mg tablet Take 300 mg by mouth nightly. telmisartan-hydroCHLOROthiazide (MICARDIS HCT) 80-12.5 mg per tablet Take 1 Tab by mouth daily. gabapentin (NEURONTIN) 300 mg capsule Take 300 mg by mouth two (2) times a day. metFORMIN (GLUCOPHAGE) 500 mg tablet Take 500 mg by mouth daily. lovastatin (MEVACOR) 20 mg tablet Take 20 mg by mouth nightly. meloxicam (MOBIC) 15 mg tablet Take 15 mg by mouth daily. No current facility-administered medications on file prior to encounter. REVIEW OF SYSTEMS    Pertinent items are noted in HPI. Objective:     Visit Vitals  BP (!) 176/74 (BP Patient Position: At rest;Sitting)   Pulse 73   Temp 98.3 °F (36.8 °C)   Resp 18       Wt Readings from Last 3 Encounters:   09/07/22 129.3 kg (285 lb)   10/27/20 127 kg (280 lb)   10/06/20 125.8 kg (277 lb 6.4 oz)       PHYSICAL EXAM  The right lower extremity wound measures smaller, remains superficial, minimal drainage, no evidence of obvious infection  Pertinent items are noted in HPI.     Assessment:   Some improvement  Problem List Items Addressed This Visit       Non-pressure chronic ulcer of other part of right lower leg with fat layer exposed ([address] - Primary    Relevant Medications    lidocaine (XYLOCAINE) 2 % viscous solution 15 mL (Completed) (Start on 9/14/2022 10:00 AM)    Other Relevant Orders    INITIATE OUTPATIENT WOUND CARE PROTOCOL       Procedure Note  Indications:  Based on my examination of this patient's wound(s)/ulcer(s) today, debridement is not required to promote healing and evaluate the wound base. Wound Leg Right #1 09/07/22 (Active)   Wound Image   09/14/22 0949   Wound Etiology Venous 09/14/22 0949   Dressing Status Clean;Dry; Intact 09/14/22 0949   Cleansed Cleansed with saline 09/14/22 0949   Wound Length (cm) 0.5 cm 09/14/22 0949   Wound Width (cm) 0.6 cm 09/14/22 0949   Wound Depth (cm) 0.1 cm 09/14/22 0949   Wound Surface Area (cm^2) 0.3 cm^2 09/14/22 0949   Change in Wound Size % 99.2 09/14/22 0949   Wound Volume (cm^3) 0.03 cm^3 09/14/22 0949   Wound Healing % 99 09/14/22 0949   Post-Procedure Length (cm) 7.5 cm 09/07/22 1012   Post-Procedure Width (cm) 5 cm 09/07/22 1012   Post-Procedure Depth (cm) 0.1 cm 09/07/22 1012   Post-Procedure Surface Area (cm^2) 37.5 cm^2 09/07/22 1012   Post-Procedure Volume (cm^3) 3.75 cm^3 09/07/22 1012   Wound Assessment Granulation tissue;Slough 09/14/22 0949   Drainage Amount Small 09/14/22 0949   Drainage Description Serosanguinous 09/14/22 0949   Wound Odor None 09/14/22 0949   Angeles-Wound/Incision Assessment Dry/flaky 09/14/22 0949   Edges Attached edges 09/14/22 0949   Wound Thickness Description Partial thickness 09/14/22 0949   Number of days: 7        Plan:   Continue silver cell plus drawtex plus double Tubigrip compression  Schedule arterial and venous noninvasive studies at the hospital vascular laboratory    Treatment Note please see attached Discharge Instructions    Written patient dismissal instructions given to patient or POA.          Electronically signed by Cynthia Hong MD on 9/14/2022 at 9:59 AM

## 2022-09-20 ENCOUNTER — HOSPITAL ENCOUNTER (OUTPATIENT)
Dept: NON INVASIVE DIAGNOSTICS | Age: 77
Discharge: HOME | End: 2022-09-20
Attending: SPECIALIST
Payer: MEDICARE

## 2022-09-20 DIAGNOSIS — L97.812 NON-PRESSURE CHRONIC ULCER OF OTHER PART OF RIGHT LOWER LEG WITH FAT LAYER EXPOSED (HCC): ICD-10-CM

## 2022-09-20 LAB
LEFT ABI: 1.17
LEFT ARM BP: 152 MMHG
LEFT POSTERIOR TIBIAL: 184 MMHG
LEFT TBI: 0.87
LEFT TOE PRESSURE: 139 MMHG
RIGHT ABI: 1.1
RIGHT ARM BP: 159 MMHG
RIGHT GSV BK MID RFX: 3 S
RIGHT POSTERIOR TIBIAL: 175 MMHG
RIGHT TBI: 0.87
RIGHT TOE PRESSURE: 138 MMHG
VAS LEFT DORSALIS PEDIS BP: 186 MMHG
VAS RIGHT DORSALIS PEDIS BP: 63 MMHG

## 2022-09-20 PROCEDURE — 93923 UPR/LXTR ART STDY 3+ LVLS: CPT

## 2022-09-20 PROCEDURE — 93970 EXTREMITY STUDY: CPT

## 2022-09-21 ENCOUNTER — HOSPITAL ENCOUNTER (OUTPATIENT)
Dept: WOUND CARE | Age: 77
Discharge: HOME | End: 2022-09-21
Payer: MEDICARE

## 2022-09-21 VITALS
SYSTOLIC BLOOD PRESSURE: 166 MMHG | HEART RATE: 75 BPM | DIASTOLIC BLOOD PRESSURE: 91 MMHG | RESPIRATION RATE: 18 BRPM | TEMPERATURE: 96.9 F

## 2022-09-21 DIAGNOSIS — L97.812 NON-PRESSURE CHRONIC ULCER OF OTHER PART OF RIGHT LOWER LEG WITH FAT LAYER EXPOSED (HCC): Primary | ICD-10-CM

## 2022-09-21 PROCEDURE — 99211 OFF/OP EST MAY X REQ PHY/QHP: CPT

## 2022-09-21 RX ORDER — MUPIROCIN 20 MG/G
OINTMENT TOPICAL ONCE
Status: CANCELLED | OUTPATIENT
Start: 2022-09-21 | End: 2022-09-21

## 2022-09-21 RX ORDER — SILVER SULFADIAZINE 10 G/1000G
CREAM TOPICAL ONCE
Status: CANCELLED | OUTPATIENT
Start: 2022-09-21 | End: 2022-09-21

## 2022-09-21 RX ORDER — LIDOCAINE HYDROCHLORIDE 20 MG/ML
15 SOLUTION OROPHARYNGEAL ONCE
Status: CANCELLED | OUTPATIENT
Start: 2022-09-21 | End: 2022-09-21

## 2022-09-21 RX ORDER — TRIAMCINOLONE ACETONIDE 1 MG/G
OINTMENT TOPICAL ONCE
Status: CANCELLED | OUTPATIENT
Start: 2022-09-21 | End: 2022-09-21

## 2022-09-21 NOTE — WOUND CARE
Discharge Instructions for  [address] [place] [address], [address] [place] [address]  Telephone: [phone]    NAME:  Monique Bay OF BIRTH:  1945  MEDICAL RECORD NUMBER:  [id number]  DATE:  9/21/2022      Return Appointment:  [] Dressing supply provider:   [x] Home Healthcare: Sandeep Laura discontinue patient is healed  [] Return Appointment:     Bert Puente)  [] Nurse Visit:  Bert Puente)    [] Discharge from [facility]  [] Ordered tests:   [x] Referrals: Refer patient to Dr. Shani Hodges   [] Rx:   [x] Other:  patient has lymphedema pumps    Wound Cleansing:   Do not scrub or use excessive force. Cleanse wound prior to applying a clean dressing with:  [x] Normal Saline   [x] Mild Soap & Water/Baby Shampoo   [] Keep Wound Dry in Shower  [] Other:      Topical Treatments:  Do not apply lotions, creams, or ointments to wound bed unless directed. [] Apply moisturizing lotion to skin surrounding the wound prior to dressing change.  [] Apply antifungal ointment to skin surrounding the wound prior to dressing change.  [] Apply thin film of moisture barrier/zinc ointment to skin immediately around wound. [] Other:       Dressings:           Wound Location  Right lower leg healed  [] Apply Primary Dressing:       [] MediHoney Gel    [] MediHoney Alginate               [] Calcium Alginate      [] Calcium Alginate with Silver   [] Collagen                   [] Collagen with Silver                [] Santyl with Moistened saline gauze              [] Hydrofera Blue (cut to size and moistened)  [] Hydrofera Blue Ready (Cut to size)   [] NS wet to dry    [] Betadine wet to dry              [] Hydrogel                [] Mepitel     [] Bactroban/Mupirocin               [] Other:     [] Cover and Secure with:     [] Gauze [] Dali [] Kerlix   [] Foam [] Super Absorbant [] ABD     [] ACE Wrap            [] Other: drawtex   Limit contact of tape with skin.     [] Change dressing: [] Daily [] Every Other Day [] Once per week   [] Twice per week   [] Three times per week [] Do Not Change Dressing   [] Other:      Pressure Relief:  [] When sitting, shift position or do seat lifts every 15 minutes.  [] Wheelchair cushion [] Specialty Bed/Mattress  [] Turn every 2 hours when in bed. Avoid direct pressure on wound site. Limit side lying to 30 degree tilt. Limit HOB elevation to 30 degrees. Edema Control:  Apply: [] Compression Stocking:   mmHg  []Right Leg []Left Leg   [] Tubigrip []Right Leg Double Layer []Left Leg Double Layer      []Right Leg Single Layer []Left Leg Single Layer     [x] Elevate leg(s) above the level of the heart when sitting. [x] Avoid prolonged standing in one place. Compression:  Apply: [x] Multilayer Compression Wrap: []RightLeg []Left Leg              X compression stockings                   []Coflex   []Coflex Lite             []Unna     [] Do not get leg(s) with wrap wet. If wraps become too tight call the center or completely remove the wrap. [] Elevate leg(s) above the level of the heart when sitting. [] Avoid prolonged standing in one place.     Off-Loading:   [] Off-loading when [] walking  [] in bed [] sitting  [] Total non-weight bearing  [] Right Leg  [] Left Leg   [] Assistive Device [] Walker [] Cane      [] Wheelchair  [] Crutches   [] Surgical shoe    [] Podus Boot(s)   [] Foam Boot(s)  [] Roll About    [] Cast Boot [] CROW Boot  [] Wedge Shoe  [] Other:  Dietary:  [] Diet as tolerated: [x] Calorie Diabetic Diet: [] No Added Salt:  [] Increase Protein: [] Other:     Activity:  [x] Activity as tolerated:    [] Patient has no activity restrictions       [] Strict Bedrest:   [] Remain off Work:       [] May return to full duty work:                                     [] Return to work with restrictions:               [address] Information: Should you experience any significant changes in your wound(s) or have questions about your wound care, please contact Geovanni Allen 26 at [place] [address] 8:00 am - 4:30. If you need help with your wound outside of these hours and cannot wait until we are again available, contact your PCP or go to the hospital emergency room. PLEASE NOTE: IF YOU ARE UNABLE TO OBTAIN WOUND SUPPLIES, CONTINUE TO USE THE SUPPLIES YOU HAVE AVAILABLE UNTIL YOU ARE ABLE TO REACH US. IT IS MOST IMPORTANT TO KEEP THE WOUND COVERED AT ALL TIMES.     [x] Dr. Jh Worrell   [] Dr. Lea Preston  [] Dr. Amna Squires

## 2022-09-21 NOTE — WOUND CARE
[address]   Progress Note and Procedure Note   NO Procedure      [address] [facility] RECORD NUMBER:  [id number]  AGE: 68 y.o. RACE WHITE/NON-  GENDER: male  : 1945  EPISODE DATE:  2022    Subjective:   Noninvasive arterial studies showed normal perfusion of both lower extremities  Venous studies showed reflux in the right greater saphenous vein  Chief Complaint   Patient presents with    Wound Check     Right leg         HISTORY of PRESENT ILLNESS HPI    Edward Zhang is a 68 y.o. male who presents today for wound/ulcer evaluation.    History of Wound Context: RLE  Wound/Ulcer Pain Timing/Severity: none  Quality of pain: dull  Severity:  1 / 10   Modifying Factors: None  Associated Signs/Symptoms: none    Ulcer Identification:  Ulcer Type: venous    Contributing Factors: venous stasis    Wound: RLE         PAST MEDICAL HISTORY    Past Medical History:   Diagnosis Date    Arthritis     Cancer ([address]     colon,skin    Diabetes ([facility] [address]     GERD (gastroesophageal reflux disease)     Gout     Hyperlipidemia     Hypertension     Ill-defined condition     obesity   BMI= 35.9 om 2016        PAST SURGICAL HISTORY    Past Surgical History:   Procedure Laterality Date    HX BACK SURGERY      HX COLONOSCOPY      HX ENDOSCOPY      HX HEENT Bilateral     Lasik    HX KNEE ARTHROSCOPY      HX MALIGNANT SKIN LESION EXCISION      HX ORTHOPAEDIC Left     shoulder reconstruction and replacement    HX OTHER SURGICAL      brain surgery for tremors    HX TOTAL COLECTOMY         FAMILY HISTORY    Family History   Problem Relation Age of Onset    Lupus Mother     Diabetes Father     Hypertension Father     Lupus Sister        SOCIAL HISTORY    Social History     Tobacco Use    Smoking status: Former     Types: Cigarettes     Quit date:      Years since quittin.7    Smokeless tobacco: Never   Substance Use Topics    Alcohol use: Never    Drug use: No       ALLERGIES    No Known Allergies    MEDICATIONS    Current Outpatient Medications on File Prior to Encounter   Medication Sig Dispense Refill    pantoprazole (PROTONIX) 40 mg tablet Take 40 mg by mouth daily. aspirin delayed-release 81 mg tablet Take 81 mg by mouth daily. fenofibrate nanocrystallized (TRICOR) 145 mg tablet Take 145 mg by mouth nightly. felodipine (PLENDIL SR) 10 mg 24 hr tablet Take 10 mg by mouth daily. allopurinol (ZYLOPRIM) 300 mg tablet Take 300 mg by mouth nightly. telmisartan-hydroCHLOROthiazide (MICARDIS HCT) 80-12.5 mg per tablet Take 1 Tab by mouth daily. gabapentin (NEURONTIN) 300 mg capsule Take 300 mg by mouth two (2) times a day. metFORMIN (GLUCOPHAGE) 500 mg tablet Take 500 mg by mouth daily. lovastatin (MEVACOR) 20 mg tablet Take 20 mg by mouth nightly. meloxicam (MOBIC) 15 mg tablet Take 15 mg by mouth daily. No current facility-administered medications on file prior to encounter. REVIEW OF SYSTEMS    Pertinent items are noted in HPI. Objective:     Visit Vitals  BP (!) 166/91 (BP 1 Location: Left arm, BP Patient Position: At rest;Sitting)   Pulse 75   Temp 96.9 °F (36.1 °C)   Resp 18       Wt Readings from Last 3 Encounters:   09/07/22 129.3 kg (285 lb)   10/27/20 127 kg (280 lb)   10/06/20 125.8 kg (277 lb 6.4 oz)       PHYSICAL EXAM  R calf wound closed  Pertinent items are noted in HPI. Assessment:    healed  Problem List Items Addressed This Visit       Non-pressure chronic ulcer of other part of right lower leg with fat layer exposed ([address] - Primary       Procedure Note  Indications:  Based on my examination of this patient's wound(s)/ulcer(s) today, debridement is not required to promote healing and evaluate the wound base. Wound Leg Right #1 09/07/22 (Active)   Wound Image   09/21/22 0920   Wound Etiology Venous 09/21/22 0920   Dressing Status Clean;Dry; Intact 09/21/22 0920   Cleansed Irrigated with saline 09/21/22 0920   Wound Length (cm) 0 cm 09/21/22 0920   Wound Width (cm) 0 cm 09/21/22 0920   Wound Depth (cm) 0 cm 09/21/22 0920   Wound Surface Area (cm^2) 0 cm^2 09/21/22 0920   Change in Wound Size % 100 09/21/22 0920   Wound Volume (cm^3) 0 cm^3 09/21/22 0920   Wound Healing % 100 09/21/22 0920   Post-Procedure Length (cm) 7.5 cm 09/07/22 1012   Post-Procedure Width (cm) 5 cm 09/07/22 1012   Post-Procedure Depth (cm) 0.1 cm 09/07/22 1012   Post-Procedure Surface Area (cm^2) 37.5 cm^2 09/07/22 1012   Post-Procedure Volume (cm^3) 3.75 cm^3 09/07/22 1012   Wound Assessment Epithelialization 09/21/22 0920   Drainage Amount None 09/21/22 0920   Drainage Description Serosanguinous 09/14/22 0949   Wound Odor None 09/21/22 0920   Angeles-Wound/Incision Assessment Dry/flaky; Intact 09/21/22 0920   Edges Attached edges 09/21/22 0920   Wound Thickness Description Partial thickness 09/21/22 0920   Number of days: 14        Plan:   RTC as needed  Discussed results of venous studies, recommend follow-up with vascular in the future if the ulcer recurs    Treatment Note please see attached Discharge Instructions    Written patient dismissal instructions given to patient or POA.          Electronically signed by Javier Garland MD on 9/21/2022 at 9:31 AM

## 2022-09-21 NOTE — DISCHARGE INSTRUCTIONS
Discharge Instructions for  [address] [place] [address], [address] [place] [address]  Telephone: [phone]    NAME:  Gilles Velarde OF BIRTH:  1945  MEDICAL RECORD NUMBER:  [id number]  DATE:  9/21/2022      Return Appointment:  [] Dressing supply provider:   [x] Home Healthcare: Bebe Dust discontinue patient is healed  [] Return Appointment:     [facility])  [] Nurse Visit:  [facility])    [] Discharge from [facility]  [] Ordered tests:   [x] Referrals: Refer patient to Dr. Aubrey Rodriguez   [] Rx:   [x] Other:  patient has lymphedema pumps    Wound Cleansing:   Do not scrub or use excessive force. Cleanse wound prior to applying a clean dressing with:  [x] Normal Saline   [x] Mild Soap & Water/Baby Shampoo   [] Keep Wound Dry in Shower  [] Other:      Topical Treatments:  Do not apply lotions, creams, or ointments to wound bed unless directed. [] Apply moisturizing lotion to skin surrounding the wound prior to dressing change.  [] Apply antifungal ointment to skin surrounding the wound prior to dressing change.  [] Apply thin film of moisture barrier/zinc ointment to skin immediately around wound. [] Other:       Dressings:           Wound Location  Right lower leg healed  [] Apply Primary Dressing:       [] MediHoney Gel    [] MediHoney Alginate               [] Calcium Alginate      [] Calcium Alginate with Silver   [] Collagen                   [] Collagen with Silver                [] Santyl with Moistened saline gauze              [] Hydrofera Blue (cut to size and moistened)  [] Hydrofera Blue Ready (Cut to size)   [] NS wet to dry    [] Betadine wet to dry              [] Hydrogel                [] Mepitel     [] Bactroban/Mupirocin               [] Other:     [] Cover and Secure with:     [] Gauze [] Dali [] Kerlix   [] Foam [] Super Absorbant [] ABD     [] ACE Wrap            [] Other: drawtex   Limit contact of tape with skin.     [] Change dressing: [] Daily [] Every Other Day [] Once per week   [] Twice per week   [] Three times per week [] Do Not Change Dressing   [] Other:      Pressure Relief:  [] When sitting, shift position or do seat lifts every 15 minutes.  [] Wheelchair cushion [] Specialty Bed/Mattress  [] Turn every 2 hours when in bed. Avoid direct pressure on wound site. Limit side lying to 30 degree tilt. Limit HOB elevation to 30 degrees. Edema Control:  Apply: [] Compression Stocking:   mmHg  []Right Leg []Left Leg   [] Tubigrip []Right Leg Double Layer []Left Leg Double Layer      []Right Leg Single Layer []Left Leg Single Layer     [x] Elevate leg(s) above the level of the heart when sitting. [x] Avoid prolonged standing in one place. Compression:  Apply: [x] Multilayer Compression Wrap: []RightLeg []Left Leg              X compression stockings                   []Coflex   []Coflex Lite             []Unna     [] Do not get leg(s) with wrap wet. If wraps become too tight call the center or completely remove the wrap. [] Elevate leg(s) above the level of the heart when sitting. [] Avoid prolonged standing in one place.     Off-Loading:   [] Off-loading when [] walking  [] in bed [] sitting  [] Total non-weight bearing  [] Right Leg  [] Left Leg   [] Assistive Device [] Walker [] Cane      [] Wheelchair  [] Crutches   [] Surgical shoe    [] Podus Boot(s)   [] Foam Boot(s)  [] Roll About    [] Cast Boot [] CROW Boot  [] Wedge Shoe  [] Other:  Dietary:  [] Diet as tolerated: [x] Calorie Diabetic Diet: [] No Added Salt:  [] Increase Protein: [] Other:     Activity:  [x] Activity as tolerated:    [] Patient has no activity restrictions       [] Strict Bedrest:   [] Remain off Work:       [] May return to full duty work:                                     [] Return to work with restrictions:               [address] Information: Should you experience any significant changes in your wound(s) or have questions about your wound care, please contact Geovanni Allen 26 at [place] [phone] 8:00 am - 4:30. If you need help with your wound outside of these hours and cannot wait until we are again available, contact your PCP or go to the hospital emergency room. PLEASE NOTE: IF YOU ARE UNABLE TO OBTAIN WOUND SUPPLIES, CONTINUE TO USE THE SUPPLIES YOU HAVE AVAILABLE UNTIL YOU ARE ABLE TO REACH US. IT IS MOST IMPORTANT TO KEEP THE WOUND COVERED AT ALL TIMES.     [x] Dr. Mauro Schmidt   [] Dr. Marlys Hernández  [] Dr. Maria Luisa Nielsen

## 2023-05-18 RX ORDER — ALLOPURINOL 300 MG/1
300 TABLET ORAL NIGHTLY
COMMUNITY

## 2023-05-18 RX ORDER — PANTOPRAZOLE SODIUM 40 MG/1
40 TABLET, DELAYED RELEASE ORAL DAILY
COMMUNITY

## 2023-05-18 RX ORDER — GABAPENTIN 300 MG/1
300 CAPSULE ORAL 2 TIMES DAILY
COMMUNITY

## 2023-05-18 RX ORDER — FENOFIBRATE 145 MG/1
145 TABLET, COATED ORAL
COMMUNITY

## 2023-05-18 RX ORDER — TELMISARTAN AND HYDROCHLORTHIAZIDE 80; 12.5 MG/1; MG/1
1 TABLET ORAL DAILY
COMMUNITY

## 2023-05-18 RX ORDER — ASPIRIN 81 MG/1
81 TABLET ORAL DAILY
COMMUNITY

## 2023-05-18 RX ORDER — FELODIPINE 10 MG/1
10 TABLET, EXTENDED RELEASE ORAL DAILY
COMMUNITY

## 2023-05-18 RX ORDER — LOVASTATIN 20 MG/1
20 TABLET ORAL NIGHTLY
COMMUNITY

## 2023-05-18 RX ORDER — MELOXICAM 15 MG/1
15 TABLET ORAL DAILY
COMMUNITY

## 2024-02-05 NOTE — PROGRESS NOTES
Discharge plan of care/case management plan validated with provider discharge order. Removed IV without complications, no telemetry, no bazzi catheter present. Discharge instructions given to patient,questions addressed. Wheeled down to ground entrance safely, discharge to home with home health. No complaints. [FreeTextEntry1] : Pt remains CHELSEY.  He will f/u in 6 months for a recheck.

## 2024-04-03 NOTE — FLOWSHEET NOTE
[facility]  Endoscopy Preprocedure Instructions      1. On the day of your surgery, please report to registration located on the 2nd floor of the  the Main Hospital. yes    2. You must have a responsible adult to drive you to the hospital, stay at the hospital during your procedure and drive you home. If they leave your procedure will not be started (no exceptions). yes    3. Do not have anything to eat or drink (including water, gum, mints, coffee, and juice) after midnight. This does not apply to the medications you were instructed to take by your physician.yes  If you are currently taking Plavix, Coumadin, Aspirin, or other blood-thinning agents, contact your physician for special instructions. yes,meloxicam    4. If you are having a procedure that requires bowel prep: We recommend that you have only clear liquids the day before your procedure and begin your bowel prep by 5:00 pm.  You may continue to drink clear liquids until midnight.  If for any reason you are not able to complete your prep please notify your physician immediately. yes    5. Have a list of all current medications, including vitamins, herbal supplements and any other over the counter medications. Reviewed over the phone    6. If you wear glasses, contacts, dentures and/or hearing aids, they may be removed prior to procedure, please bring a case to store them in. not applicable    7. You should understand that if you do not follow these instructions your procedure may be cancelled.  If your physical condition changes (I.e. fever, cold or flu) please contact your doctor as soon as possible.    8. It is important that you be on time.  If for any reason you are unable to keep your appointment please call [phone] the day of or your physician’s office prior to your scheduled procedure    9. Have you received your COVID Vaccine? yes If no, you will need to receive a COVID test/swab here at [facility] the [facility] parking lot Monday

## 2024-04-10 ENCOUNTER — HOSPITAL ENCOUNTER (OUTPATIENT)
Facility: HOSPITAL | Age: 79
Setting detail: OUTPATIENT SURGERY
Discharge: HOME | End: 2024-04-10
Attending: INTERNAL MEDICINE | Admitting: INTERNAL MEDICINE
Payer: MEDICARE

## 2024-04-10 ENCOUNTER — ANESTHESIA EVENT (OUTPATIENT)
Facility: HOSPITAL | Age: 79
End: 2024-04-10
Payer: MEDICARE

## 2024-04-10 ENCOUNTER — ANESTHESIA (OUTPATIENT)
Facility: HOSPITAL | Age: 79
End: 2024-04-10
Payer: MEDICARE

## 2024-04-10 VITALS
OXYGEN SATURATION: 96 % | HEART RATE: 74 BPM | BODY MASS INDEX: 38.85 KG/M2 | SYSTOLIC BLOOD PRESSURE: 136 MMHG | TEMPERATURE: 98.2 F | WEIGHT: 271.39 LBS | RESPIRATION RATE: 20 BRPM | HEIGHT: 70 IN | DIASTOLIC BLOOD PRESSURE: 66 MMHG

## 2024-04-10 LAB
GLUCOSE BLD STRIP.AUTO-MCNC: 112 MG/DL (ref 65–117)
SERVICE CMNT-IMP: NORMAL

## 2024-04-10 PROCEDURE — 2580000003 HC RX 258: Performed by: NURSE ANESTHETIST, CERTIFIED REGISTERED

## 2024-04-10 PROCEDURE — 3700000000 HC ANESTHESIA ATTENDED CARE: Performed by: INTERNAL MEDICINE

## 2024-04-10 PROCEDURE — 7100000010 HC PHASE II RECOVERY - FIRST 15 MIN: Performed by: INTERNAL MEDICINE

## 2024-04-10 PROCEDURE — 88305 TISSUE EXAM BY PATHOLOGIST: CPT

## 2024-04-10 PROCEDURE — 6360000002 HC RX W HCPCS: Performed by: NURSE ANESTHETIST, CERTIFIED REGISTERED

## 2024-04-10 PROCEDURE — 7100000011 HC PHASE II RECOVERY - ADDTL 15 MIN: Performed by: INTERNAL MEDICINE

## 2024-04-10 PROCEDURE — 3700000001 HC ADD 15 MINUTES (ANESTHESIA): Performed by: INTERNAL MEDICINE

## 2024-04-10 PROCEDURE — 3600007512: Performed by: INTERNAL MEDICINE

## 2024-04-10 PROCEDURE — 82962 GLUCOSE BLOOD TEST: CPT

## 2024-04-10 PROCEDURE — 3600007502: Performed by: INTERNAL MEDICINE

## 2024-04-10 PROCEDURE — 2580000003 HC RX 258: Performed by: INTERNAL MEDICINE

## 2024-04-10 RX ORDER — SODIUM CHLORIDE 0.9 % (FLUSH) 0.9 %
5-40 SYRINGE (ML) INJECTION PRN
Status: CANCELLED | OUTPATIENT
Start: 2024-04-10

## 2024-04-10 RX ORDER — ONDANSETRON 4 MG/1
4 TABLET, ORALLY DISINTEGRATING ORAL EVERY 8 HOURS PRN
Status: CANCELLED | OUTPATIENT
Start: 2024-04-10

## 2024-04-10 RX ORDER — SODIUM CHLORIDE 0.9 % (FLUSH) 0.9 %
5-40 SYRINGE (ML) INJECTION EVERY 12 HOURS SCHEDULED
Status: CANCELLED | OUTPATIENT
Start: 2024-04-10

## 2024-04-10 RX ORDER — SODIUM CHLORIDE 9 MG/ML
25 INJECTION, SOLUTION INTRAVENOUS PRN
Status: DISCONTINUED | OUTPATIENT
Start: 2024-04-10 | End: 2024-04-10 | Stop reason: HOSPADM

## 2024-04-10 RX ORDER — SODIUM CHLORIDE 0.9 % (FLUSH) 0.9 %
5-40 SYRINGE (ML) INJECTION PRN
Status: DISCONTINUED | OUTPATIENT
Start: 2024-04-10 | End: 2024-04-10 | Stop reason: HOSPADM

## 2024-04-10 RX ORDER — PROPOFOL 10 MG/ML
INJECTION, EMULSION INTRAVENOUS PRN
Status: DISCONTINUED | OUTPATIENT
Start: 2024-04-10 | End: 2024-04-10 | Stop reason: SDUPTHER

## 2024-04-10 RX ORDER — SODIUM CHLORIDE 0.9 % (FLUSH) 0.9 %
5-40 SYRINGE (ML) INJECTION EVERY 12 HOURS SCHEDULED
Status: DISCONTINUED | OUTPATIENT
Start: 2024-04-10 | End: 2024-04-10 | Stop reason: HOSPADM

## 2024-04-10 RX ORDER — ONDANSETRON 2 MG/ML
4 INJECTION INTRAMUSCULAR; INTRAVENOUS EVERY 6 HOURS PRN
Status: CANCELLED | OUTPATIENT
Start: 2024-04-10

## 2024-04-10 RX ORDER — LIDOCAINE HCL/PF 100 MG/5ML
SYRINGE (ML) INJECTION PRN
Status: DISCONTINUED | OUTPATIENT
Start: 2024-04-10 | End: 2024-04-10 | Stop reason: SDUPTHER

## 2024-04-10 RX ORDER — SODIUM CHLORIDE 9 MG/ML
INJECTION, SOLUTION INTRAVENOUS CONTINUOUS PRN
Status: DISCONTINUED | OUTPATIENT
Start: 2024-04-10 | End: 2024-04-10 | Stop reason: SDUPTHER

## 2024-04-10 RX ORDER — SODIUM CHLORIDE 9 MG/ML
INJECTION, SOLUTION INTRAVENOUS PRN
Status: CANCELLED | OUTPATIENT
Start: 2024-04-10

## 2024-04-10 RX ADMIN — PROPOFOL 150 MCG/KG/MIN: 10 INJECTION, EMULSION INTRAVENOUS at 09:13

## 2024-04-10 RX ADMIN — SODIUM CHLORIDE 25 ML: 9 INJECTION, SOLUTION INTRAVENOUS at 09:04

## 2024-04-10 RX ADMIN — SODIUM CHLORIDE: 900 INJECTION, SOLUTION INTRAVENOUS at 09:06

## 2024-04-10 RX ADMIN — PROPOFOL 50 MG: 10 INJECTION, EMULSION INTRAVENOUS at 09:12

## 2024-04-10 RX ADMIN — LIDOCAINE HYDROCHLORIDE 100 MG: 20 INJECTION INTRAVENOUS at 09:12

## 2024-04-10 ASSESSMENT — PAIN SCALES - GENERAL: PAINLEVEL_OUTOF10: 0

## 2024-04-10 ASSESSMENT — PAIN - FUNCTIONAL ASSESSMENT: PAIN_FUNCTIONAL_ASSESSMENT: 0-10

## 2024-04-10 NOTE — H&P
.Pre-Endoscopy H&P Update  Chief complaint/HPI/ROS:  The indication for the procedure, the patient's history and the patient's current medications are reviewed prior to the procedure and that data is reported on the H&P to which this document is attached.  Any significant complaints with regard to organ systems will be noted, and if not mentioned then a review of systems is not contributory.  Past Medical History:   Diagnosis Date    Arthritis     Cancer (HCC)     colon,skin    Diabetes (HCC)     GERD (gastroesophageal reflux disease)     Gout     Hyperlipidemia     Hypertension     Ill-defined condition     obesity   BMI= 35.9 om 2016      Past Surgical History:   Procedure Laterality Date    BACK SURGERY      COLONOSCOPY      multiple    HEENT Bilateral     Lasik    MALIGNANT SKIN LESION EXCISION      unsure of type of cancer    ORTHOPEDIC SURGERY Left     shoulder reconstruction and replacement    OTHER SURGICAL HISTORY      brain surgery for tremors    TOTAL COLECTOMY      pt think it was only a partial colon removal    UPPER GASTROINTESTINAL ENDOSCOPY       Social   Social History     Tobacco Use    Smoking status: Former     Current packs/day: 0.00     Types: Cigarettes, Pipe     Quit date: 1965     Years since quittin.3    Smokeless tobacco: Never    Tobacco comments:     Casual smoker when smoked in the past   Substance Use Topics    Alcohol use: Not Currently     Comment: none in 50+ yrs      Family History   Problem Relation Age of Onset    Lupus Mother     Hypertension Father     Diabetes Father     Lupus Sister     Rheum Arthritis Other       No Known Allergies   Prior to Admission Medications   Prescriptions Last Dose Informant Patient Reported? Taking?   allopurinol (ZYLOPRIM) 300 MG tablet 2024  Yes No   Sig: Take 1 tablet by mouth nightly   aspirin 81 MG EC tablet 2024  Yes No   Sig: Take 1 tablet by mouth daily   felodipine (PLENDIL) 10 MG extended release tablet 4/10/2024

## 2024-04-10 NOTE — DISCHARGE INSTRUCTIONS
.                       [facility]  [facility]  Gerald Reilly MD  [phone]      April 10, 2024    Lex Naidu  YOB: 1945    COLONOSCOPY DISCHARGE INSTRUCTIONS    If there is redness at IV site you should apply warm compress to area.  If redness or soreness persist contact Dr. Reilly's or your primary care doctor.    There may be a slight amount of blood passed from the rectum.  Gaseous discomfort may develop, but walking, belching will help relieve this.  You may not operate a vehicle for 12 hours  You may not operate machinery or dangerous appliances for rest of today  You may not drink alcoholic beverages for 12 hours  Avoid making any critical decisions for 24 hours    DIET:  You may resume your normal diet, but some patients find that heavy or large meals may lead to indigestion or vomiting.  I suggest a light meal as first food intake.    MEDICATIONS:  The use of some over-the-counter pain medication may lead to bleeding after colon biopsies or polyp removal.  Tylenol (also called acetaminophen) is safe to take even if you have had colonoscopy with polyp removal.  Remember that Tylenol (also called acetaminophen) is safe to take after colonoscopy even if you have had biopsies or polyps removed.    ACTIVITY:  You may resume your normal household activities, but it is recommended that you spend the remainder of the day resting -  avoid any strenuous activity.    CALL DR. REILLY'S OFFICE IF:  Increasing pain, nausea, vomiting  Abdominal distension (swelling)  Significant new or increased bleeding (oral or rectal)  Fever/Chills  Chest pain/shortness of breath                       Additional instructions:   Colonoscopy-   Complete colonoscopy to cecum with fair bowel prep and good visualization after extensive cleaning rare sigmoid colon diverticula otherwise unremarkable exam  Random colon biopsies obtained for further workup of

## 2024-04-10 NOTE — ANESTHESIA POSTPROCEDURE EVALUATION
Department of Anesthesiology  Postprocedure Note    Patient: Lex Naidu  MRN: [id number]  YOB: 1945  Date of evaluation: 4/10/2024    Procedure Summary       Date: 04/10/24 Room / Location: [place] [address] / [facility] ENDOSCOPY    Anesthesia Start: 0906 Anesthesia Stop: 0925    Procedures:       COLONOSCOPY      COLONOSCOPY BIOPSY (Lower GI Region) Diagnosis:       Lower abdominal pain      Altered bowel habits      Rectal bleeding      Personal history of colon cancer      (Lower abdominal pain [R10.30])      (Altered bowel habits [R19.4])      (Rectal bleeding [K62.5])      (Personal history of colon cancer [Z85.038])    Surgeons: Gerald Lott MD Responsible Provider: Rc Blanco MD    Anesthesia Type: MAC ASA Status: 3            Anesthesia Type: No value filed.    Ramos Phase I:      Ramos Phase II: Ramos Score: 10    Anesthesia Post Evaluation    Patient location during evaluation: PACU  Patient participation: complete - patient participated  Level of consciousness: awake  Pain score: 0  Airway patency: patent  Nausea & Vomiting: no nausea and no vomiting  Cardiovascular status: blood pressure returned to baseline  Respiratory status: acceptable  Hydration status: euvolemic  Multimodal analgesia pain management approach  Pain management: adequate    No notable events documented.

## 2024-04-10 NOTE — OP NOTE
.                       [facility]  [facility]  Gerald Lott MD  [phone]      April 10, 2024    Colonoscopy Procedure Note  Lex Naidu  :  1945  [facility] Medical Record Number: [id number]    Indications:   chronic diarrhea, polyp surveillance   PCP:  Srini Putnam Jr., MD  Anesthesia/Sedation: Monitored anesthesia care, see separate note  Endoscopist:  Gerald Lott MD   Complications:  None  Estimated Blood Loss:  None    Permit:  The indications, risks, benefits and alternatives were reviewed with the patient or their decision maker who was provided an opportunity to ask questions and all questions were answered.  The specific risks of colonoscopy with conscious sedation were reviewed, including but not limited to anesthetic complication, bleeding, adverse drug reaction, missed lesion, infection, IV site reactions, and intestinal perforation which would lead to the need for surgical repair.  Alternatives to colonoscopy including radiographic imaging, observation without testing, or laboratory testing were reviewed including the limitations of those alternatives.  After considering the options and having all their questions answered, the patient or their decision maker provided both verbal and written consent to proceed.        Procedure in Detail:  After obtaining informed consent, positioning of the patient in the left lateral decubitus position, and conduction of a pre-procedure pause or \"time out\" the endoscope was introduced into the anus and advanced to the cerum.  The quality of the colonic preparation was good.  A careful inspection was made as the colonoscope was withdrawn, findings and interventions are described below.  After cleaning the colon, the [place] Bowel Prep score: 8   Right colon-2, transverse colon-3, left colon-3    Findings:   Single rectal exam-normal perineal skin inspection, normal anal sphincter tone, no palpable anal

## 2024-04-10 NOTE — ANESTHESIA PRE PROCEDURE
Department of Anesthesiology  Preprocedure Note       Name:  Lex Naidu   Age:  78 y.o.  :  1945                                          MRN:  [id number]         Date:  4/10/2024      Surgeon: Surgeon(s):  Gerald Lott MD    Procedure: Procedure(s):  COLONOSCOPY    Medications prior to admission:   Prior to Admission medications    Medication Sig Start Date End Date Taking? Authorizing Provider   allopurinol (ZYLOPRIM) 300 MG tablet Take 1 tablet by mouth nightly    Automatic Reconciliation, Ar   aspirin 81 MG EC tablet Take 1 tablet by mouth daily    Automatic Reconciliation, Ar   felodipine (PLENDIL) 10 MG extended release tablet Take 1 tablet by mouth daily    Automatic Reconciliation, Ar   fenofibrate (TRICOR) 145 MG tablet Take 1 tablet by mouth nightly    Automatic Reconciliation, Ar   gabapentin (NEURONTIN) 300 MG capsule Take 1 capsule by mouth 2 times daily.    Automatic Reconciliation, Ar   lovastatin (MEVACOR) 20 MG tablet Take 1 tablet by mouth nightly    Automatic Reconciliation, Ar   meloxicam (MOBIC) 15 MG tablet Take 1 tablet by mouth daily    Automatic Reconciliation, Ar   metFORMIN (GLUCOPHAGE) 500 MG tablet Take 1 tablet by mouth daily    Automatic Reconciliation, Ar   pantoprazole (PROTONIX) 40 MG tablet Take 1 tablet by mouth daily    Automatic Reconciliation, Ar   telmisartan-hydroCHLOROthiazide (MICARDIS HCT) 80-12.5 MG per tablet Take 1 tablet by mouth daily    Automatic Reconciliation, Ar       Current medications:    No current facility-administered medications for this encounter.       Allergies:  No Known Allergies    Problem List:    Patient Active Problem List   Diagnosis Code   • Mixed hyperlipidemia E78.2   • Ill-defined condition R69   • Essential hypertension I10   • OA (osteoarthritis) of shoulder M19.019   • Lumbar stenosis with neurogenic claudication M48.062   • Controlled type 2 diabetes mellitus without complication, without long-term current use of insulin (HCC)

## 2024-07-10 RX ORDER — KETOCONAZOLE 20 MG/ML
SHAMPOO TOPICAL EVERY OTHER DAY
COMMUNITY
Start: 2024-04-05

## 2024-07-10 NOTE — FLOWSHEET NOTE
COVID test/swab here at [facility] the MOB parking lot Monday - Friday 8a - 11am. There are no Saturday or Sunday swabbing at any [facility] facility. (patient verbalizes understanding) not applicable

## 2024-07-15 ENCOUNTER — HOSPITAL ENCOUNTER (OUTPATIENT)
Facility: HOSPITAL | Age: 79
Setting detail: OUTPATIENT SURGERY
Discharge: HOME | End: 2024-07-15
Attending: INTERNAL MEDICINE | Admitting: INTERNAL MEDICINE
Payer: MEDICARE

## 2024-07-15 ENCOUNTER — ANESTHESIA (OUTPATIENT)
Facility: HOSPITAL | Age: 79
End: 2024-07-15
Payer: MEDICARE

## 2024-07-15 ENCOUNTER — ANESTHESIA EVENT (OUTPATIENT)
Facility: HOSPITAL | Age: 79
End: 2024-07-15
Payer: MEDICARE

## 2024-07-15 VITALS
RESPIRATION RATE: 21 BRPM | TEMPERATURE: 98.2 F | WEIGHT: 277.12 LBS | HEART RATE: 71 BPM | DIASTOLIC BLOOD PRESSURE: 71 MMHG | HEIGHT: 70 IN | OXYGEN SATURATION: 96 % | SYSTOLIC BLOOD PRESSURE: 146 MMHG | BODY MASS INDEX: 39.67 KG/M2

## 2024-07-15 LAB
GLUCOSE BLD STRIP.AUTO-MCNC: 122 MG/DL (ref 65–117)
SERVICE CMNT-IMP: ABNORMAL

## 2024-07-15 PROCEDURE — 7100000010 HC PHASE II RECOVERY - FIRST 15 MIN: Performed by: INTERNAL MEDICINE

## 2024-07-15 PROCEDURE — 3600007512: Performed by: INTERNAL MEDICINE

## 2024-07-15 PROCEDURE — 2500000003 HC RX 250 WO HCPCS: Performed by: NURSE ANESTHETIST, CERTIFIED REGISTERED

## 2024-07-15 PROCEDURE — 6360000002 HC RX W HCPCS: Performed by: NURSE ANESTHETIST, CERTIFIED REGISTERED

## 2024-07-15 PROCEDURE — 7100000011 HC PHASE II RECOVERY - ADDTL 15 MIN: Performed by: INTERNAL MEDICINE

## 2024-07-15 PROCEDURE — 3700000001 HC ADD 15 MINUTES (ANESTHESIA): Performed by: INTERNAL MEDICINE

## 2024-07-15 PROCEDURE — 82962 GLUCOSE BLOOD TEST: CPT

## 2024-07-15 PROCEDURE — 2709999900 HC NON-CHARGEABLE SUPPLY: Performed by: INTERNAL MEDICINE

## 2024-07-15 PROCEDURE — 3600007502: Performed by: INTERNAL MEDICINE

## 2024-07-15 PROCEDURE — 88305 TISSUE EXAM BY PATHOLOGIST: CPT

## 2024-07-15 PROCEDURE — 3700000000 HC ANESTHESIA ATTENDED CARE: Performed by: INTERNAL MEDICINE

## 2024-07-15 RX ORDER — SODIUM CHLORIDE 0.9 % (FLUSH) 0.9 %
5-40 SYRINGE (ML) INJECTION EVERY 12 HOURS SCHEDULED
Status: DISCONTINUED | OUTPATIENT
Start: 2024-07-15 | End: 2024-07-15 | Stop reason: HOSPADM

## 2024-07-15 RX ORDER — ONDANSETRON 4 MG/1
4 TABLET, ORALLY DISINTEGRATING ORAL EVERY 8 HOURS PRN
Status: DISCONTINUED | OUTPATIENT
Start: 2024-07-15 | End: 2024-07-15 | Stop reason: HOSPADM

## 2024-07-15 RX ORDER — SODIUM CHLORIDE 0.9 % (FLUSH) 0.9 %
5-40 SYRINGE (ML) INJECTION PRN
Status: DISCONTINUED | OUTPATIENT
Start: 2024-07-15 | End: 2024-07-15 | Stop reason: HOSPADM

## 2024-07-15 RX ORDER — SODIUM CHLORIDE 9 MG/ML
INJECTION, SOLUTION INTRAVENOUS PRN
Status: DISCONTINUED | OUTPATIENT
Start: 2024-07-15 | End: 2024-07-15 | Stop reason: HOSPADM

## 2024-07-15 RX ORDER — SODIUM CHLORIDE 9 MG/ML
25 INJECTION, SOLUTION INTRAVENOUS PRN
Status: DISCONTINUED | OUTPATIENT
Start: 2024-07-15 | End: 2024-07-15 | Stop reason: HOSPADM

## 2024-07-15 RX ORDER — PROPOFOL 10 MG/ML
INJECTION, EMULSION INTRAVENOUS CONTINUOUS PRN
Status: DISCONTINUED | OUTPATIENT
Start: 2024-07-15 | End: 2024-07-15 | Stop reason: SDUPTHER

## 2024-07-15 RX ORDER — DEXMEDETOMIDINE HYDROCHLORIDE 100 UG/ML
INJECTION, SOLUTION INTRAVENOUS PRN
Status: DISCONTINUED | OUTPATIENT
Start: 2024-07-15 | End: 2024-07-15 | Stop reason: SDUPTHER

## 2024-07-15 RX ORDER — ONDANSETRON 2 MG/ML
4 INJECTION INTRAMUSCULAR; INTRAVENOUS EVERY 6 HOURS PRN
Status: DISCONTINUED | OUTPATIENT
Start: 2024-07-15 | End: 2024-07-15 | Stop reason: HOSPADM

## 2024-07-15 RX ADMIN — LIDOCAINE HYDROCHLORIDE 100 MG: 20 INJECTION, SOLUTION INFILTRATION; PERINEURAL at 13:16

## 2024-07-15 RX ADMIN — PROPOFOL 500 MCG/KG/MIN: 10 INJECTION, EMULSION INTRAVENOUS at 13:16

## 2024-07-15 RX ADMIN — DEXMEDETOMIDINE 6 MCG: 100 INJECTION, SOLUTION INTRAVENOUS at 13:16

## 2024-07-15 ASSESSMENT — PAIN - FUNCTIONAL ASSESSMENT: PAIN_FUNCTIONAL_ASSESSMENT: 0-10

## 2024-07-15 ASSESSMENT — PAIN SCALES - GENERAL: PAINLEVEL_OUTOF10: 0

## 2024-07-15 NOTE — OP NOTE
.                         [facility]  [facility]  Gerald Lott MD  [phone]      July 15, 2024    Esophagogastroduodenoscopy (EGD) Procedure Note  Lex Naidu  : 1945  [facility] Medical Record Number: [id number]      Indications:   Chronic postprandial diarrhea, chronic use of pantoprazole 40 mg once daily for well-controlled GERD symptoms, recent colonoscopy with random colon biopsies negative for causes of chronic diarrhea  Referring Physician:  Srini Putnam Jr., MD  Anesthesia/Sedation: Monitored anesthesia care, see separate note  Endoscopist:  Gerald Lott MD   Complications:  None  Estimated Blood Loss:  None    Permit:  The indications, risks, benefits and alternatives were reviewed with the patient or their decision maker who was provided an opportunity to ask questions and all questions were answered.  The specific risks of esophagogastroduodenoscopy with conscious sedation were reviewed, including but not limited to anesthetic complication, bleeding, adverse drug reaction, missed lesion, infection, IV site reactions, and intestinal perforation which would lead to the need for surgical repair.  Alternatives to EGD including radiographic imaging, observation without testing, or laboratory testing were reviewed as well as the limitations of those alternatives discussed.  After considering the options and having all their questions answered, the patient or their decision maker provided both verbal and written consent to proceed.       Procedure in Detail:  After obtaining informed consent, positioning of the patient in the left lateral decubitus position, and conduction of a pre-procedure pause or \"time out\" the endoscope was introduced into the mouth and advanced to the duodenum.  A careful inspection was made, and findings or interventions are described below.    Findings:   Esophagus-normal-appearing esophagus and gastroesophageal

## 2024-07-15 NOTE — PROGRESS NOTES

## 2024-07-15 NOTE — H&P
.Pre-Endoscopy H&P Update  Chief complaint/HPI/ROS:  The indication for the procedure, the patient's history and the patient's current medications are reviewed prior to the procedure and that data is reported on the H&P to which this document is attached.  Any significant complaints with regard to organ systems will be noted, and if not mentioned then a review of systems is not contributory.  Past Medical History:   Diagnosis Date    Arthritis     Cancer (HCC)     colon,skin/colon - surgery/? unsure if he had chemotherapy (was experimental and he had a port)    Diabetes (HCC)     GERD (gastroesophageal reflux disease)     Gout     Hyperlipidemia     Hypertension     Ill-defined condition     obesity   BMI= 35.9 om 2016      Past Surgical History:   Procedure Laterality Date    BACK SURGERY      COLONOSCOPY      multiple    COLONOSCOPY N/A 04/10/2024    COLONOSCOPY performed by Gerald Lott MD at [facility] ENDOSCOPY    COLONOSCOPY N/A 04/10/2024    COLONOSCOPY BIOPSY performed by Gerald Lott MD at [facility] ENDOSCOPY    HEENT Bilateral     Lasik    MALIGNANT SKIN LESION EXCISION      unsure of type of cancer    ORTHOPEDIC SURGERY Left     shoulder reconstruction and replacement    OTHER SURGICAL HISTORY      brain surgery for tremors    PORT SURGERY      port placement - then removed    TOTAL COLECTOMY      pt think it was only a partial colon removal    UPPER GASTROINTESTINAL ENDOSCOPY       Social   Social History     Tobacco Use    Smoking status: Former     Current packs/day: 0.00     Types: Cigarettes, Pipe     Quit date: 1965     Years since quittin.5    Smokeless tobacco: Never    Tobacco comments:     Casual smoker when smoked in the past/quit cigarettes, cigars, pipe   Substance Use Topics    Alcohol use: Not Currently     Comment: none in 50+ yrs      Family History   Problem Relation Age of Onset    Lupus Mother     Hypertension Father     Diabetes Father     Stroke Father     Lupus Sister

## 2024-07-15 NOTE — DISCHARGE INSTRUCTIONS
.                       [facility]  [facility]  Gerald Reilly MD  [phone]      July 15, 2024    Lex Naidu  YOB: 1945    EGD DISCHARGE INSTRUCTIONS    If there is redness at IV site you should apply warm compress to area.  If redness or soreness persist contact Dr. Reilly's or your primary care doctor.    There may be a slight amount of blood passed from the rectum.  Gaseous discomfort may develop, but walking, belching will help relieve this.  You may not operate a vehicle for 12 hours  You may not operate machinery or dangerous appliances for rest of today  You may not drink alcoholic beverages for 12 hours  Avoid making any critical decisions for 24 hours    DIET:  You may resume your normal diet, but some patients find that heavy or large meals may lead to indigestion or vomiting.  I suggest a light meal as first food intake.    MEDICATIONS:  The use of some over-the-counter pain medication may lead to bleeding after colon biopsies or polyp removal.  Tylenol (also called acetaminophen) is safe to take even if you have had colonoscopy with polyp removal.    Remember that Tylenol (also called acetaminophen) is safe to take after colonoscopy even if you have had biopsies or polyps removed.    ACTIVITY:  You may resume your normal household activities, but it is recommended that you spend the remainder of the day resting -  avoid any strenuous activity.    CALL DR. REILLY'S OFFICE IF:  Increasing pain, nausea, vomiting  Abdominal distension (swelling)  Significant new or increased bleeding (oral or rectal)  Fever/Chills  Chest pain/shortness of breath                       Additional instructions:     Impression:   Unremarkable EGD/upper endoscopy with mucosal biopsies obtained from the stomach and duodenum further assessment of chronic after meal diarrhea           Recommendations:   Resume preprocedure medications and diet awaiting biopsy

## 2024-07-15 NOTE — ANESTHESIA PRE PROCEDURE
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 unsure if he had chemotherapy (was experimental and he had a port)    Diabetes (HCC)     GERD (gastroesophageal reflux disease)     Gout     Hyperlipidemia     Hypertension     Ill-defined condition     obesity   BMI= 35.9 om 2016       Past Surgical History:        Procedure Laterality Date    BACK SURGERY      COLONOSCOPY      multiple    COLONOSCOPY N/A 04/10/2024    COLONOSCOPY performed by Gerald Lott MD at [facility] ENDOSCOPY    COLONOSCOPY N/A 04/10/2024    COLONOSCOPY BIOPSY performed by Gerald Lott MD at [facility] ENDOSCOPY    HEENT Bilateral     Lasik    MALIGNANT SKIN LESION EXCISION      unsure of type of cancer    ORTHOPEDIC SURGERY Left     shoulder reconstruction and replacement    OTHER SURGICAL HISTORY      brain surgery for tremors    PORT SURGERY      port placement - then removed    TOTAL COLECTOMY      pt think it was only a partial colon removal    UPPER GASTROINTESTINAL ENDOSCOPY         Social History:    Social History     Tobacco Use    Smoking status: Former     Current packs/day: 0.00     Types: Cigarettes, Pipe     Quit date: 1965     Years since quittin.5    Smokeless tobacco: Never    Tobacco comments:     Casual smoker when smoked in the past/quit cigarettes, cigars, pipe   Substance Use Topics    Alcohol use: Not Currently     Comment: none in 50+ yrs                                Counseling given: Not Answered  Tobacco comments: Casual smoker when smoked in the past/quit cigarettes, cigars, pipe      Vital Signs (Current):   There were no vitals filed for this visit.                                             BP Readings from Last 3

## 2024-07-16 NOTE — ANESTHESIA POSTPROCEDURE EVALUATION
Department of Anesthesiology  Postprocedure Note    Patient: Lex Naidu  MRN: [id number]  YOB: 1945  Date of evaluation: 7/16/2024    Procedure Summary       Date: 07/15/24 Room / Location: [place] [address] / [facility] ENDOSCOPY    Anesthesia Start: 1311 Anesthesia Stop: 1325    Procedures:       ESOPHAGOGASTRODUODENOSCOPY (Upper GI Region)      ESOPHAGOGASTRODUODENOSCOPY BIOPSY (Upper GI Region) Diagnosis:       Other fecal abnormalities      (Other fecal abnormalities [R19.5])    Surgeons: Gerald Lott MD Responsible Provider: Fanny Haynes MD    Anesthesia Type: MAC ASA Status: 3            Anesthesia Type: No value filed.    Ramos Phase I: Ramos Score: 10    Ramos Phase II: Ramos Score: 10    Anesthesia Post Evaluation    Patient location during evaluation: PACU  Patient participation: complete - patient participated  Level of consciousness: awake  Airway patency: patent  Nausea & Vomiting: no vomiting and no nausea  Cardiovascular status: hemodynamically stable  Respiratory status: acceptable  Hydration status: stable  Pain management: adequate    No notable events documented.

## 2024-08-19 ENCOUNTER — HOSPITAL ENCOUNTER (OUTPATIENT)
Facility: HOSPITAL | Age: 79
Discharge: HOME | End: 2024-08-19
Attending: SURGERY
Payer: MEDICARE

## 2024-08-19 VITALS
DIASTOLIC BLOOD PRESSURE: 84 MMHG | HEART RATE: 73 BPM | TEMPERATURE: 98.1 F | BODY MASS INDEX: 39.94 KG/M2 | RESPIRATION RATE: 18 BRPM | SYSTOLIC BLOOD PRESSURE: 190 MMHG | HEIGHT: 70 IN | WEIGHT: 279 LBS

## 2024-08-19 DIAGNOSIS — L97.512 DIABETIC ULCER OF RIGHT FOOT ASSOCIATED WITH TYPE 1 DIABETES MELLITUS, WITH FAT LAYER EXPOSED, UNSPECIFIED PART OF FOOT (HCC): Primary | ICD-10-CM

## 2024-08-19 DIAGNOSIS — E10.621 DIABETIC ULCER OF RIGHT FOOT ASSOCIATED WITH TYPE 1 DIABETES MELLITUS, WITH FAT LAYER EXPOSED, UNSPECIFIED PART OF FOOT (HCC): Primary | ICD-10-CM

## 2024-08-19 PROBLEM — L97.509 DIABETIC FOOT ULCER (HCC): Status: ACTIVE | Noted: 2024-08-19

## 2024-08-19 PROBLEM — E11.621 DIABETIC FOOT ULCER (HCC): Status: ACTIVE | Noted: 2024-08-19

## 2024-08-19 PROCEDURE — 87070 CULTURE OTHR SPECIMN AEROBIC: CPT

## 2024-08-19 PROCEDURE — 87205 SMEAR GRAM STAIN: CPT

## 2024-08-19 PROCEDURE — 99204 OFFICE O/P NEW MOD 45 MIN: CPT

## 2024-08-19 RX ORDER — AMOXICILLIN AND CLAVULANATE POTASSIUM 875; 125 MG/1; MG/1
1 TABLET, FILM COATED ORAL 2 TIMES DAILY
Qty: 14 TABLET | Refills: 0 | Status: SHIPPED | OUTPATIENT
Start: 2024-08-19 | End: 2024-08-26

## 2024-08-19 ASSESSMENT — PAIN SCALES - GENERAL: PAINLEVEL_OUTOF10: 5

## 2024-08-19 NOTE — WOUND CARE
Wound Clinic Physician Orders and Discharge Instructions  [facility]  [address], [address]  [place] [address]  Telephone: [phone]     FAX [phone]    NAME:  Lex Naidu  YOB: 1945  MEDICAL RECORD NUMBER:  [id number]  DATE:  8/19/2024      Return Appointment:    [] Dressing Supply Provider:   [] Home Healthcare:  [x] Return Appointment: 1 Week(s)  [] Nurse Visit:     [] Discharge from [facility]: [] Healed        [] Refer to Provider:         [] Consult    Follow-up Information:    [] Ordered Tests:  [] Vascular/Arterial Testing [] Imaging (X-ray, MRI, or CT)   [] Please call [phone] to schedule any testing    [] Referrals:    [] Infectious Disease  [] Vascular  [] Other:    [x] Rx to pharmacy: Augmentin  [x] Would Culture obtained in clinic  [x] Other: follow up with PCP to check Hgb A1C      Wound Cleansing:   Do not scrub or use excessive force.  Cleanse wound prior to applying a clean dressing with:    [x] Normal Saline   [] Mild Soap & Water     [] Keep Wound Dry in Shower  [] Wear a cast cover to shower  [] Other:       Topical Treatments:  Do not apply lotions, creams, or ointments to wound bed unless directed.     [] Apply moisturizing lotion to skin surrounding the wound prior to dressing change.  [] Apply antifungal ointment to skin surrounding the wound prior to dressing change.  [] Apply thin film of moisture barrier ointment (Zinc) to skin immediately around wound.  [] Apply Betadine to skin immediately around wound   [] Apply Skin Prep to skin immediately around wound  [] Other:      Dressings:           Wound Location Right Foot    [x] Apply Primary Dressing:       [] MediHoney Gel [] MediHoney Alginate  [x] Calcium Alginate with Silver   [] Calcium Alginate without silver   [] Collagen with silver [] Collagen without Silver    [] Santyl with moist saline gauze     [] Hydrofera Blue (cut to size and moistened with normal saline)

## 2024-08-19 NOTE — CONSULTS
any friction over the wound.    To see his primary care physician for better control of his blood sugar    Follow-up in 1 week.    Discharge:  Written patient dismissal instructions given to patient and signed by patient or POA.         Discharge Instructions              Wound Clinic Physician Orders and Discharge Instructions  [facility]  [address], [address]  [place] [address]  Telephone: [phone]     FAX [phone]    NAME:  Lex Naidu  YOB: 1945  MEDICAL RECORD NUMBER:  [id number]  DATE:  8/19/2024      Return Appointment:    [] Dressing Supply Provider:   [] Home Healthcare:  [x] Return Appointment: 1 Week(s)  [] Nurse Visit:     [] Discharge from [facility]: [] Healed        [] Refer to Provider:         [] Consult    Follow-up Information:    [] Ordered Tests:  [] Vascular/Arterial Testing [] Imaging (X-ray, MRI, or CT)   [] Please call [phone] to schedule any testing    [] Referrals:    [] Infectious Disease  [] Vascular  [] Other:    [x] Rx to pharmacy: Augmentin  [x] Would Culture obtained in clinic  [x] Other: follow up with PCP to check Hgb A1C      Wound Cleansing:   Do not scrub or use excessive force.  Cleanse wound prior to applying a clean dressing with:    [x] Normal Saline   [] Mild Soap & Water     [] Keep Wound Dry in Shower  [] Wear a cast cover to shower  [] Other:       Topical Treatments:  Do not apply lotions, creams, or ointments to wound bed unless directed.     [] Apply moisturizing lotion to skin surrounding the wound prior to dressing change.  [] Apply antifungal ointment to skin surrounding the wound prior to dressing change.  [] Apply thin film of moisture barrier ointment (Zinc) to skin immediately around wound.  [] Apply Betadine to skin immediately around wound   [] Apply Skin Prep to skin immediately around wound  [] Other:      Dressings:           Wound Location Right Foot    [x] Apply Primary Dressing:       []

## 2024-08-19 NOTE — DISCHARGE INSTRUCTIONS
Wound Clinic Physician Orders and Discharge Instructions  [facility]  [address], [address]  [place] [address]  Telephone: [phone]     FAX [phone]    NAME:  Lex Naidu  YOB: 1945  MEDICAL RECORD NUMBER:  [id number]  DATE:  8/19/2024      Return Appointment:    [] Dressing Supply Provider:   [] Home Healthcare:  [x] Return Appointment: 1 Week(s)  [] Nurse Visit:     [] Discharge from [facility]: [] Healed        [] Refer to Provider:         [] Consult    Follow-up Information:    [] Ordered Tests:  [] Vascular/Arterial Testing [] Imaging (X-ray, MRI, or CT)   [] Please call [phone] to schedule any testing    [] Referrals:    [] Infectious Disease  [] Vascular  [] Other:    [x] Rx to pharmacy: Augmentin  [x] Would Culture obtained in clinic  [x] Other: follow up with PCP to check Hgb A1C      Wound Cleansing:   Do not scrub or use excessive force.  Cleanse wound prior to applying a clean dressing with:    [x] Normal Saline   [] Mild Soap & Water     [] Keep Wound Dry in Shower  [] Wear a cast cover to shower  [] Other:       Topical Treatments:  Do not apply lotions, creams, or ointments to wound bed unless directed.     [] Apply moisturizing lotion to skin surrounding the wound prior to dressing change.  [] Apply antifungal ointment to skin surrounding the wound prior to dressing change.  [] Apply thin film of moisture barrier ointment (Zinc) to skin immediately around wound.  [] Apply Betadine to skin immediately around wound   [] Apply Skin Prep to skin immediately around wound  [] Other:      Dressings:           Wound Location Right Foot    [x] Apply Primary Dressing:       [] MediHoney Gel [] MediHoney Alginate  [x] Calcium Alginate with Silver   [] Calcium Alginate without silver   [] Collagen with silver [] Collagen without Silver    [] Santyl with moist saline gauze     [] Hydrofera Blue (cut to size and moistened with normal saline)

## 2024-08-22 LAB
BACTERIA SPEC CULT: NORMAL
GRAM STN SPEC: NORMAL
GRAM STN SPEC: NORMAL
Lab: NORMAL

## 2024-08-26 ENCOUNTER — HOSPITAL ENCOUNTER (OUTPATIENT)
Facility: HOSPITAL | Age: 79
Discharge: HOME | End: 2024-08-26
Attending: SURGERY
Payer: MEDICARE

## 2024-08-26 VITALS
HEART RATE: 71 BPM | TEMPERATURE: 98.2 F | RESPIRATION RATE: 18 BRPM | SYSTOLIC BLOOD PRESSURE: 164 MMHG | DIASTOLIC BLOOD PRESSURE: 70 MMHG

## 2024-08-26 PROCEDURE — 99213 OFFICE O/P EST LOW 20 MIN: CPT

## 2024-08-26 NOTE — DISCHARGE INSTRUCTIONS
Wound Clinic Physician Orders and Discharge Instructions  [facility]  [address], [address]  [place] [address]  Telephone: [phone]     FAX [phone]    NAME:  Lex Naidu  YOB: 1945  MEDICAL RECORD NUMBER:  [id number]  DATE:  8/26/2024      Return Appointment:    [] Dressing Supply Provider:   [] Home Healthcare:  [x] Return Appointment: 2 Week(s)  [] Nurse Visit:     [] Discharge from [facility]: [] Healed        [] Refer to Provider:         [] Consult    Follow-up Information:    [] Ordered Tests:  [] Vascular/Arterial Testing [] Imaging (X-ray, MRI, or CT)   [] Please call [phone] to schedule any testing    [] Referrals:    [] Infectious Disease  [] Vascular  [] Other:    [x] Rx to pharmacy: Augmentin   [] Would Culture obtained in clinic  [] Other:     Wound Cleansing:   Do not scrub or use excessive force.  Cleanse wound prior to applying a clean dressing with:    [] Normal Saline   [x] Mild Soap & Water     [] Keep Wound Dry in Shower  [] Wear a cast cover to shower  [] Other:       Topical Treatments:  Do not apply lotions, creams, or ointments to wound bed unless directed.     [] Apply moisturizing lotion to skin surrounding the wound prior to dressing change.  [] Apply antifungal ointment to skin surrounding the wound prior to dressing change.  [] Apply thin film of moisture barrier ointment (Zinc) to skin immediately around wound.  [] Apply Betadine to skin immediately around wound   [] Apply Skin Prep to skin immediately around wound  [] Other:      Dressings:           Wound Location Right Foot    [x] Apply Primary Dressing:       [] MediHoney Gel [] MediHoney Alginate  [x] Calcium Alginate with Silver   [] Calcium Alginate without silver   [] Collagen with silver [] Collagen without Silver    [] Santyl with moist saline gauze     [] Hydrofera Blue (cut to size and moistened with normal saline)  [] Hydrofera Blue Ready (cut to size)   Applied in Clinic to []RightLeg []Left Leg   [] Do not get cast wet.  Contact wound center or go to emergency room if there is a foul odor or becomes uncomfortable due to feeling tight or swelling.  Do not use objects inside of cast to scratch.      Dietary:  [x] Diet as tolerated [] Diabetic Diet [] No Added Salt  [x] Increase Protein [] Other:     Activity:  [x] Activity as tolerated  [] Patient has no activity restrictions      [] Strict Bedrest   [] Remain off Work:       [] May return to full duty work                                    [] Return to work with restrictions:     Physician:  [] Dr. Cristal Magana  [] Dr. Mikki Thompson   [x] Dr. Clayton Alatorre  [] Tripp Manning PA-C  [] Dr. Donavon Torres  [] Dr. Singh Cardoso  [] Dr. Viktoria Cole    Nurse Case Manger:        Wound Care Center Information: Should you experience any significant changes in your wound(s) or have questions about your wound care, please contact the Wound Center at [phone]. Our hours are Monday - Friday 8am - 4:30pm, closed all major holidays. If you need help with your wound outside these hours and cannot wait until we are again available, contact your PCP or go to the hospital emergency room.     PLEASE NOTE: IF YOU ARE UNABLE TO OBTAIN WOUND SUPPLIES, CONTINUE TO USE THE SUPPLIES YOU HAVE AVAILABLE UNTIL YOU ARE ABLE TO REACH US. IT IS MOST IMPORTANT TO KEEP THE WOUND COVERED AT ALL TIMES.

## 2024-08-26 NOTE — WOUND CARE
Wound Clinic Physician Orders and Discharge Instructions  [facility]  [address], [address]  [place] [address]  Telephone: [phone]     FAX [phone]    NAME:  Lex Naidu  YOB: 1945  MEDICAL RECORD NUMBER:  [id number]  DATE:  8/26/2024      Return Appointment:    [] Dressing Supply Provider:   [] Home Healthcare:  [x] Return Appointment: 2 Week(s)  [] Nurse Visit:     [] Discharge from [facility]: [] Healed        [] Refer to Provider:         [] Consult    Follow-up Information:    [] Ordered Tests:  [] Vascular/Arterial Testing [] Imaging (X-ray, MRI, or CT)   [] Please call [phone] to schedule any testing    [] Referrals:    [] Infectious Disease  [] Vascular  [] Other:    [x] Rx to pharmacy: Augmentin   [] Would Culture obtained in clinic  [] Other:     Wound Cleansing:   Do not scrub or use excessive force.  Cleanse wound prior to applying a clean dressing with:    [] Normal Saline   [x] Mild Soap & Water     [] Keep Wound Dry in Shower  [] Wear a cast cover to shower  [] Other:       Topical Treatments:  Do not apply lotions, creams, or ointments to wound bed unless directed.     [] Apply moisturizing lotion to skin surrounding the wound prior to dressing change.  [] Apply antifungal ointment to skin surrounding the wound prior to dressing change.  [] Apply thin film of moisture barrier ointment (Zinc) to skin immediately around wound.  [] Apply Betadine to skin immediately around wound   [] Apply Skin Prep to skin immediately around wound  [] Other:      Dressings:           Wound Location Right Foot    [x] Apply Primary Dressing:       [] MediHoney Gel [] MediHoney Alginate  [x] Calcium Alginate with Silver   [] Calcium Alginate without silver   [] Collagen with silver [] Collagen without Silver    [] Santyl with moist saline gauze     [] Hydrofera Blue (cut to size and moistened with normal saline)  [] Hydrofera Blue Ready (cut to size)       [] Normal Saline wet to dry  [] Betadine wet to dry    [] Hydrogel       [] Bactroban/Mupirocin   [] Iodoform Packing Strip [] Plain Packing Strip   [] Skin Sub, Steri-Strips, Mepitel (DO NOT REMOVE)  [] Other:      [x] Cover and Secure with:     [x] Gauze [x] Katty [] Kerlix   [] Zetuvit Plus Silicone Border or Foam Border [] Super Absorbant [] ABD     [] Ace Wrap [x] Other: Foam   Limit contact of tape with skin.    [x] Change dressing: [] Daily    [] Every Other Day   [] Twice per week   [x] Three times per week   [] Once a week [] Do Not Change Dressing   [] Other:     Pressure Relief:  [] When sitting, shift position or do seat lifts every 15 minutes.  [] Wheelchair cushion [] Specialty Bed/Mattress  [x] Avoid direct pressure on wound site.  Limit side lying to 30 degree tilt.  Limit HOB elevation to 30 degrees.  [] Other     Edema Control:  Apply: [] Compression Stocking:  mmHg  []Right Leg []Left Leg   [x] Tubigrip []Right Leg Double Layer []Left Leg Double Layer      [x]Right Leg Single Layer []Left Leg Single Layer      [x] Elevate leg(s) above the level of the heart when sitting.    [x] Avoid prolonged standing in one place.     Compression:  Apply: [] Compression Wrap to []RightLeg []Left Leg    []  Coflex [] Coflex Lite  [] Unna with Calamine     [] Do not get leg(s) with wrap wet. If wrap becomes too tight, call the wound center and remove wrap from leg by unrolling each layer.   [] Elevate leg(s) above the level of the heart when sitting.    [] Avoid prolonged standing in one place.    Off-Loading:   [x] Off-loading when: [x] walking  [] in bed [x] sitting  [] Total non-weight bearing  [] Right Leg  [] Left Leg   [x] Assistive Device [] Walker [x] Cane  [] Wheelchair  [] Crutches   [] Surgical shoe    [] Wedge Shoe  [] Foam Boot(s)  [] Knee Scooter    [] Cast Boot [] CROW Boot     [] Diabetic Shoes    [] Offloading heel boot  [x] Other: open shoe    Contact Cast:  Apply: [] Total Contact Cast

## 2024-08-26 NOTE — PROGRESS NOTES
[facility]   Wound Care and [facility] Oxygen Therapy Center   Medical Staff Note     Lex Naidu  MEDICAL RECORD NUMBER:  [id number]  AGE: 79 y.o.   GENDER: male  : 1945  EPISODE DATE:  2024      Chief Complaint:   Chief Complaint   Patient presents with    Wound Check     R foot       History of Present Illness:  Lex Naidu is a 79 y.o. male who presents today for wound/ulcer evaluation.  He developed an open wound in his right lateral aspect of the foot approximately 3 weeks ago.  But he did not tell anybody about the open wound.  He was trying to do the dressing with the help of a nurse practitioner who lives next-door using hydrogen peroxide to clean the wound and apply dressing.  His wife came to know about a couple of days ago when he was trying to make an appointment at the wound center.  He has not seen his primary doctor either.  He does not know his last hemoglobin A1c level as well.  No fever or chills.  He thinks that his shoe might have caused friction and breakdown of the skin in his right lateral foot.    Wound cultures from last week showed no specific bacterial growth.  However the patient overall feels lot better.  He still has a few more days of amoxicillin.     History of Wound Context: Per original history and physical on this patient. Changes in history since last evaluation: As in HPI  Wound/Ulcer Pain Timing/Severity:  none  Quality of pain:  N/A  Severity:  0 / 10   Modifying Factors: Modifying Factors Wound: None  Associated Signs/Symptoms:  edema, erythema, and drainage     Ulcer Identification:  Ulcer Type: Wound type: ulceration due to breakdown of the skin from friction secondary to his footwear/shoe     Contributing Factors: edema, diabetes, poor glucose control, chronic pressure, shear force, and obesity     Wound: Right lateral foot open wound/diabetic ulcer     Past Medical History:       Diagnosis Date    Arthritis     Cancer (HCC)

## 2024-09-09 ENCOUNTER — HOSPITAL ENCOUNTER (OUTPATIENT)
Facility: HOSPITAL | Age: 79
Discharge: HOME | End: 2024-09-09
Attending: SURGERY
Payer: MEDICARE

## 2024-09-09 VITALS
RESPIRATION RATE: 18 BRPM | HEART RATE: 67 BPM | SYSTOLIC BLOOD PRESSURE: 184 MMHG | DIASTOLIC BLOOD PRESSURE: 83 MMHG | TEMPERATURE: 98.2 F

## 2024-09-09 PROCEDURE — 99213 OFFICE O/P EST LOW 20 MIN: CPT

## 2024-09-23 ENCOUNTER — HOSPITAL ENCOUNTER (OUTPATIENT)
Facility: HOSPITAL | Age: 79
Discharge: HOME | End: 2024-09-23
Attending: SURGERY
Payer: MEDICARE

## 2024-09-23 VITALS
RESPIRATION RATE: 18 BRPM | TEMPERATURE: 98 F | SYSTOLIC BLOOD PRESSURE: 173 MMHG | HEART RATE: 74 BPM | DIASTOLIC BLOOD PRESSURE: 78 MMHG

## 2024-09-23 PROCEDURE — 17250 CHEM CAUT OF GRANLTJ TISSUE: CPT

## 2024-09-30 ENCOUNTER — HOSPITAL ENCOUNTER (OUTPATIENT)
Facility: HOSPITAL | Age: 79
Discharge: HOME | End: 2024-09-30
Attending: SURGERY
Payer: MEDICARE

## 2024-09-30 VITALS
SYSTOLIC BLOOD PRESSURE: 174 MMHG | HEART RATE: 76 BPM | RESPIRATION RATE: 18 BRPM | TEMPERATURE: 98 F | DIASTOLIC BLOOD PRESSURE: 79 MMHG

## 2024-09-30 DIAGNOSIS — L97.512 DIABETIC ULCER OF RIGHT FOOT ASSOCIATED WITH TYPE 1 DIABETES MELLITUS, WITH FAT LAYER EXPOSED, UNSPECIFIED PART OF FOOT (HCC): Primary | ICD-10-CM

## 2024-09-30 DIAGNOSIS — E10.621 DIABETIC ULCER OF RIGHT FOOT ASSOCIATED WITH TYPE 1 DIABETES MELLITUS, WITH FAT LAYER EXPOSED, UNSPECIFIED PART OF FOOT (HCC): Primary | ICD-10-CM

## 2024-09-30 PROCEDURE — 11042 DBRDMT SUBQ TIS 1ST 20SQCM/<: CPT

## 2024-09-30 NOTE — WOUND CARE
Wound Clinic Physician Orders and Discharge Instructions  [facility]  [address], [address]  [place] [address]  Telephone: [phone]     FAX [phone]    NAME:  Lex Naidu  YOB: 1945  MEDICAL RECORD NUMBER:  [id number]  DATE:  9/30/2024      Return Appointment:    [] Dressing Supply Provider:   [] Home Healthcare:  [x] Return Appointment: 1 Week(s)  [] Nurse Visit:     [] Discharge from [facility]: [] Healed        [] Refer to Provider:         [] Consult    Follow-up Information:    [] Ordered Tests:  [] Vascular/Arterial Testing [] Imaging (X-ray, MRI, or CT)   [] Please call [phone] to schedule any testing    [] Referrals:    [] Infectious Disease  [] Vascular  [] Other:    [] Rx to pharmacy:   [] Would Culture obtained in clinic  [] Other:     Wound Cleansing:   Do not scrub or use excessive force.  Cleanse wound prior to applying a clean dressing with:    [] Normal Saline   [x] Mild Soap & Water     [] Keep Wound Dry in Shower  [] Wear a cast cover to shower  [] Other:       Topical Treatments:  Do not apply lotions, creams, or ointments to wound bed unless directed.     [] Apply moisturizing lotion to skin surrounding the wound prior to dressing change.  [] Apply antifungal ointment to skin surrounding the wound prior to dressing change.  [] Apply thin film of moisture barrier ointment (Zinc) to skin immediately around wound.  [] Apply Betadine to skin immediately around wound   [] Apply Skin Prep to skin immediately around wound  [] Other:      Dressings:           Wound Location Right Foot    [x] Apply Primary Dressing:       [] MediHoney Gel [] MediHoney Alginate  [x] Calcium Alginate with Silver   [] Calcium Alginate without silver   [] Collagen with silver [] Collagen without Silver    [] Santyl with moist saline gauze     [] Hydrofera Blue (cut to size and moistened with normal saline)  [] Hydrofera Blue Ready (cut to size)      []

## 2024-09-30 NOTE — PROGRESS NOTES
45 min or more.   This is above the usual time needed to address patient's chief complaint today: [] Yes  [] No  This time includes physician non-face-to-face service time visit on the date of service such as  Preparing to see the patient (eg, review of tests)  Obtaining and/or reviewing separately obtained history  Performing a medically necessary appropriate examination and/or evaluation  Counseling and educating the patient/family/caregiver  Ordering medications, tests, or procedures  Referring and communicating with other health care professionals as needed  Documenting clinical information in the electronic or other health record  Independently interpreting results (not reported separately) and communicating results to the patient/family/caregiver  Care coordination (not reported separately)    Electronically signed by Clayton Alatorre MD on 9/30/2024 at 10:46 AM

## 2024-09-30 NOTE — OP NOTE
Procedure Note  Indications: Based on my examination of this patient's wound(s)/ulcer(s) today, debridement is required to promote healing and evaluate the wound base.    Debridement: Excisional Debridement    Using: curette the wound(s)/ulcer(s) was/were debrided down through and including the removal of epidermis, dermis, and subcutaneous tissue.  Performed by: Clayton Alatorre MD  Consent obtained: Yes  Time out taken: Yes  Pain Control: Anesthetic  Anesthetic: 4% Lidocaine Liquid Topical       Devitalized Tissue Debrided: fibrin, biofilm, and slough    Pre Debridement Measurements:  Are located in the Wound/Ulcer Documentation Flow Sheet    Diabetic/Pressure/Non Pressure Ulcers only:  Ulcer: Other: Right lateral foot ulcer      Wound/Ulcer #: 1  Post Debridement Measurements:  Wound/Ulcer Descriptions are Pre Debridement except measurements:  Wound 08/19/24 Foot Right #1 (Active)   Wound Image   09/30/24 0920   Wound Etiology Diabetic Oh 2 09/30/24 0920   Dressing Status Clean;Dry;Intact 09/30/24 0920   Wound Cleansed Cleansed with saline 09/30/24 0920   Dressing/Treatment Alginate with Ag;Dry dressing;Foam;Gauze dressing/dressing sponge;Tubular bandage 09/09/24 0954   Offloading for Diabetic Foot Ulcers Offloading ordered 09/30/24 0920   Wound Length (cm) 0.8 cm 09/30/24 0920   Wound Width (cm) 0.5 cm 09/30/24 0920   Wound Depth (cm) 0.2 cm 09/30/24 0920   Wound Surface Area (cm^2) 0.4 cm^2 09/30/24 0920   Change in Wound Size % (l*w) 69.23 09/30/24 0920   Wound Volume (cm^3) 0.08 cm^3 09/30/24 0920   Wound Healing % 38 09/30/24 0920   Post-Procedure Length (cm) 0.9 cm 09/30/24 0952   Post-Procedure Width (cm) 0.6 cm 09/30/24 0952   Post-Procedure Depth (cm) 0.3 cm 09/30/24 0952   Post-Procedure Surface Area (cm^2) 0.54 cm^2 09/30/24 0952   Post-Procedure Volume (cm^3) 0.162 cm^3 09/30/24 0952   Wound Assessment Slough;Granulation tissue;Hyper granulation tissue 09/30/24 0920   Drainage Amount Moderate

## 2024-09-30 NOTE — DISCHARGE INSTRUCTIONS
Wound Clinic Physician Orders and Discharge Instructions  [facility]  [address], [address]  [place] [address]  Telephone: [phone]     FAX [phone]    NAME:  Lex Naidu  YOB: 1945  MEDICAL RECORD NUMBER:  [id number]  DATE:  9/30/2024      Return Appointment:    [] Dressing Supply Provider:   [] Home Healthcare:  [x] Return Appointment: 1 Week(s)  [] Nurse Visit:     [] Discharge from [facility]: [] Healed        [] Refer to Provider:         [] Consult    Follow-up Information:    [] Ordered Tests:  [] Vascular/Arterial Testing [] Imaging (X-ray, MRI, or CT)   [] Please call [phone] to schedule any testing    [] Referrals:    [] Infectious Disease  [] Vascular  [] Other:    [] Rx to pharmacy:   [] Would Culture obtained in clinic  [] Other:     Wound Cleansing:   Do not scrub or use excessive force.  Cleanse wound prior to applying a clean dressing with:    [] Normal Saline   [x] Mild Soap & Water     [] Keep Wound Dry in Shower  [] Wear a cast cover to shower  [] Other:       Topical Treatments:  Do not apply lotions, creams, or ointments to wound bed unless directed.     [] Apply moisturizing lotion to skin surrounding the wound prior to dressing change.  [] Apply antifungal ointment to skin surrounding the wound prior to dressing change.  [] Apply thin film of moisture barrier ointment (Zinc) to skin immediately around wound.  [] Apply Betadine to skin immediately around wound   [] Apply Skin Prep to skin immediately around wound  [] Other:      Dressings:           Wound Location Right Foot    [x] Apply Primary Dressing:       [] MediHoney Gel [] MediHoney Alginate  [x] Calcium Alginate with Silver   [] Calcium Alginate without silver   [] Collagen with silver [] Collagen without Silver    [] Santyl with moist saline gauze     [] Hydrofera Blue (cut to size and moistened with normal saline)  [] Hydrofera Blue Ready (cut to size)      []

## 2024-10-07 ENCOUNTER — HOSPITAL ENCOUNTER (OUTPATIENT)
Facility: HOSPITAL | Age: 79
Discharge: HOME | End: 2024-10-07
Attending: SURGERY
Payer: MEDICARE

## 2024-10-07 VITALS
RESPIRATION RATE: 18 BRPM | HEART RATE: 75 BPM | SYSTOLIC BLOOD PRESSURE: 133 MMHG | TEMPERATURE: 98.2 F | DIASTOLIC BLOOD PRESSURE: 76 MMHG

## 2024-10-07 DIAGNOSIS — L97.512 DIABETIC ULCER OF RIGHT FOOT ASSOCIATED WITH TYPE 1 DIABETES MELLITUS, WITH FAT LAYER EXPOSED, UNSPECIFIED PART OF FOOT (HCC): Primary | ICD-10-CM

## 2024-10-07 DIAGNOSIS — E10.621 DIABETIC ULCER OF RIGHT FOOT ASSOCIATED WITH TYPE 1 DIABETES MELLITUS, WITH FAT LAYER EXPOSED, UNSPECIFIED PART OF FOOT (HCC): Primary | ICD-10-CM

## 2024-10-07 PROCEDURE — 11042 DBRDMT SUBQ TIS 1ST 20SQCM/<: CPT

## 2024-10-07 PROCEDURE — 87205 SMEAR GRAM STAIN: CPT

## 2024-10-07 PROCEDURE — 87070 CULTURE OTHR SPECIMN AEROBIC: CPT

## 2024-10-07 ASSESSMENT — PAIN SCALES - GENERAL: PAINLEVEL_OUTOF10: 1

## 2024-10-07 ASSESSMENT — PAIN DESCRIPTION - DESCRIPTORS: DESCRIPTORS: ACHING

## 2024-10-07 ASSESSMENT — PAIN DESCRIPTION - ORIENTATION: ORIENTATION: RIGHT

## 2024-10-07 ASSESSMENT — PAIN DESCRIPTION - LOCATION: LOCATION: FOOT

## 2024-10-07 NOTE — PROGRESS NOTES
AVAILABLE UNTIL YOU ARE ABLE TO REACH US. IT IS MOST IMPORTANT TO KEEP THE WOUND COVERED AT ALL TIMES.           TIME: E/M Time spent with patient and/or patient care issues: [] 15-20 min  [] 21-30 min  [] 31-44 min  [] 45 min or more.   This is above the usual time needed to address patient's chief complaint today: [] Yes  [] No  This time includes physician non-face-to-face service time visit on the date of service such as  Preparing to see the patient (eg, review of tests)  Obtaining and/or reviewing separately obtained history  Performing a medically necessary appropriate examination and/or evaluation  Counseling and educating the patient/family/caregiver  Ordering medications, tests, or procedures  Referring and communicating with other health care professionals as needed  Documenting clinical information in the electronic or other health record  Independently interpreting results (not reported separately) and communicating results to the patient/family/caregiver  Care coordination (not reported separately)    Electronically signed by Clayton Alatorre MD on 10/7/2024 at 10:41 AM

## 2024-10-07 NOTE — WOUND CARE
Wound Clinic Physician Orders and Discharge Instructions  [facility]  [address], [address]  [place] [address]  Telephone: [phone]     FAX [phone]    NAME:  Lex Naidu  YOB: 1945  MEDICAL RECORD NUMBER:  [id number]  DATE:  10/7/2024      Return Appointment:    [] Dressing Supply Provider:   [] Home Healthcare:  [x] Return Appointment: 1 Week(s)  [] Nurse Visit:     [] Discharge from [facility]: [] Healed        [] Refer to Provider:         [] Consult    Follow-up Information:    [] Ordered Tests:  [] Vascular/Arterial Testing [] Imaging (X-ray, MRI, or CT)   [] Please call [phone] to schedule any testing    [] Referrals:    [] Infectious Disease  [] Vascular  [] Other:    [] Rx to pharmacy:   [x] Would Culture obtained in clinic  [] Other:     Wound Cleansing:   Do not scrub or use excessive force.  Cleanse wound prior to applying a clean dressing with:    [] Normal Saline   [x] Mild Soap & Water     [] Keep Wound Dry in Shower  [] Wear a cast cover to shower  [] Other:       Topical Treatments:  Do not apply lotions, creams, or ointments to wound bed unless directed.     [] Apply moisturizing lotion to skin surrounding the wound prior to dressing change.  [] Apply antifungal ointment to skin surrounding the wound prior to dressing change.  [] Apply thin film of moisture barrier ointment (Zinc) to skin immediately around wound.  [] Apply Betadine to skin immediately around wound   [] Apply Skin Prep to skin immediately around wound  [] Other:      Dressings:           Wound Location Right Foot    [x] Apply Primary Dressing:       [] MediHoney Gel [] MediHoney Alginate  [x] Calcium Alginate with Silver   [] Calcium Alginate without silver   [] Collagen with silver [] Collagen without Silver    [] Santyl with moist saline gauze     [] Hydrofera Blue (cut to size and moistened with normal saline)  [] Hydrofera Blue Ready (cut to size)      []

## 2024-10-07 NOTE — OP NOTE
Procedure Note  Indications: Based on my examination of this patient's wound(s)/ulcer(s) today, debridement is required to promote healing and evaluate the wound base.    Debridement: Excisional Debridement    Using: curette the wound(s)/ulcer(s) was/were debrided down through and including the removal of epidermis, dermis, and subcutaneous tissue.  Performed by: Clayton Alatorre MD  Consent obtained: Yes  Time out taken: Yes  Pain Control: Anesthetic  Anesthetic: 4% Lidocaine Liquid Topical       Devitalized Tissue Debrided: fibrin, biofilm, and slough    Pre Debridement Measurements:  Are located in the Wound/Ulcer Documentation Flow Sheet    Diabetic/Pressure/Non Pressure Ulcers only:  Ulcer: Diabetic ulcer, fat layer exposed     Wound/Ulcer #: 1  Post Debridement Measurements:  Wound/Ulcer Descriptions are Pre Debridement except measurements:  Wound 08/19/24 Foot Right #1 (Active)   Wound Image   10/07/24 0959   Wound Etiology Diabetic Oh 2 10/07/24 0959   Dressing Status Clean;Dry;Intact 10/07/24 0959   Wound Cleansed Cleansed with saline 10/07/24 0959   Dressing/Treatment Alginate with Ag;Dry dressing;Foam;Gauze dressing/dressing sponge;Tubular bandage 09/09/24 0954   Offloading for Diabetic Foot Ulcers Offloading ordered 10/07/24 0959   Wound Length (cm) 0.7 cm 10/07/24 0959   Wound Width (cm) 0.8 cm 10/07/24 0959   Wound Depth (cm) 0.2 cm 10/07/24 0959   Wound Surface Area (cm^2) 0.56 cm^2 10/07/24 0959   Change in Wound Size % (l*w) 56.92 10/07/24 0959   Wound Volume (cm^3) 0.112 cm^3 10/07/24 0959   Wound Healing % 14 10/07/24 0959   Post-Procedure Length (cm) 0.8 cm 10/07/24 1013   Post-Procedure Width (cm) 0.9 cm 10/07/24 1013   Post-Procedure Depth (cm) 0.3 cm 10/07/24 1013   Post-Procedure Surface Area (cm^2) 0.72 cm^2 10/07/24 1013   Post-Procedure Volume (cm^3) 0.216 cm^3 10/07/24 1013   Wound Assessment Slough;Granulation tissue;Hyper granulation tissue 10/07/24 0964   Drainage Amount

## 2024-10-07 NOTE — DISCHARGE INSTRUCTIONS
Wound Clinic Physician Orders and Discharge Instructions  [facility]  [address], [address]  [place] [address]  Telephone: [phone]     FAX [phone]    NAME:  Lex Naidu  YOB: 1945  MEDICAL RECORD NUMBER:  [id number]  DATE:  10/7/2024      Return Appointment:    [] Dressing Supply Provider:   [] Home Healthcare:  [x] Return Appointment: 1 Week(s)  [] Nurse Visit:     [] Discharge from [facility]: [] Healed        [] Refer to Provider:         [] Consult    Follow-up Information:    [] Ordered Tests:  [] Vascular/Arterial Testing [] Imaging (X-ray, MRI, or CT)   [] Please call [phone] to schedule any testing    [] Referrals:    [] Infectious Disease  [] Vascular  [] Other:    [] Rx to pharmacy:   [x] Would Culture obtained in clinic  [] Other:     Wound Cleansing:   Do not scrub or use excessive force.  Cleanse wound prior to applying a clean dressing with:    [] Normal Saline   [x] Mild Soap & Water     [] Keep Wound Dry in Shower  [] Wear a cast cover to shower  [] Other:       Topical Treatments:  Do not apply lotions, creams, or ointments to wound bed unless directed.     [] Apply moisturizing lotion to skin surrounding the wound prior to dressing change.  [] Apply antifungal ointment to skin surrounding the wound prior to dressing change.  [] Apply thin film of moisture barrier ointment (Zinc) to skin immediately around wound.  [] Apply Betadine to skin immediately around wound   [] Apply Skin Prep to skin immediately around wound  [] Other:      Dressings:           Wound Location Right Foot    [x] Apply Primary Dressing:       [] MediHoney Gel [] MediHoney Alginate  [x] Calcium Alginate with Silver   [] Calcium Alginate without silver   [] Collagen with silver [] Collagen without Silver    [] Santyl with moist saline gauze     [] Hydrofera Blue (cut to size and moistened with normal saline)  [] Hydrofera Blue Ready (cut to size)      []

## 2024-10-13 LAB
BACTERIA SPEC CULT: ABNORMAL
GRAM STN SPEC: ABNORMAL
GRAM STN SPEC: ABNORMAL
Lab: ABNORMAL

## 2024-10-14 ENCOUNTER — HOSPITAL ENCOUNTER (OUTPATIENT)
Facility: HOSPITAL | Age: 79
Discharge: HOME | End: 2024-10-14
Attending: SURGERY
Payer: MEDICARE

## 2024-10-14 VITALS
RESPIRATION RATE: 18 BRPM | SYSTOLIC BLOOD PRESSURE: 154 MMHG | TEMPERATURE: 98.2 F | HEART RATE: 79 BPM | DIASTOLIC BLOOD PRESSURE: 79 MMHG

## 2024-10-14 DIAGNOSIS — E10.621 DIABETIC ULCER OF RIGHT FOOT ASSOCIATED WITH TYPE 1 DIABETES MELLITUS, WITH FAT LAYER EXPOSED, UNSPECIFIED PART OF FOOT (HCC): Primary | ICD-10-CM

## 2024-10-14 DIAGNOSIS — L97.512 DIABETIC ULCER OF RIGHT FOOT ASSOCIATED WITH TYPE 1 DIABETES MELLITUS, WITH FAT LAYER EXPOSED, UNSPECIFIED PART OF FOOT (HCC): Primary | ICD-10-CM

## 2024-10-14 PROCEDURE — 99213 OFFICE O/P EST LOW 20 MIN: CPT

## 2024-10-14 RX ORDER — DOXYCYCLINE HYCLATE 100 MG
100 TABLET ORAL 2 TIMES DAILY
Qty: 28 TABLET | Refills: 0 | Status: SHIPPED | OUTPATIENT
Start: 2024-10-14 | End: 2024-10-28

## 2024-10-14 NOTE — WOUND CARE
Wound culture reviewed with Dr. Alatorre and antibiotics e-prescribed.  
     [] Normal Saline wet to dry  [] Betadine wet to dry    [] Hydrogel       [] Bactroban/Mupirocin   [] Iodoform Packing Strip [] Plain Packing Strip   [] Skin Sub, Steri-Strips, Mepitel (DO NOT REMOVE)  [] Other:      [x] Cover and Secure with:     [x] Gauze [x] Katty or bandage [] Kerlix   [] Zetuvit Plus Silicone Border or Foam Border [] Super Absorbant [] ABD     [] Ace Wrap [x] Other: Foam   Limit contact of tape with skin.    [x] Change dressing: [] Daily    [] Every Other Day   [] Twice per week   [x] Three times per week   [] Once a week [] Do Not Change Dressing   [] Other:     Pressure Relief:  [] When sitting, shift position or do seat lifts every 15 minutes.  [] Wheelchair cushion [] Specialty Bed/Mattress  [x] Avoid direct pressure on wound site.  [] Other     Edema Control:  Apply: [] Compression Stocking:  mmHg  []Right Leg []Left Leg   [x] Tubigrip []Right Leg Double Layer []Left Leg Double Layer      [x]Right Leg Single Layer []Left Leg Single Layer      [x] Elevate leg(s) above the level of the heart when sitting.    [x] Avoid prolonged standing in one place.     Compression:  Apply: [] Compression Wrap to []RightLeg []Left Leg    []  Coflex [] Coflex Lite  [] Unna with Calamine     [] Do not get leg(s) with wrap wet. If wrap becomes too tight, call the wound center and remove wrap from leg by unrolling each layer.   [] Elevate leg(s) above the level of the heart when sitting.    [] Avoid prolonged standing in one place.    Off-Loading:   [x] Off-loading when: [x] walking  [] in bed [x] sitting  [] Total non-weight bearing  [] Right Leg  [] Left Leg   [x] Assistive Device [] Walker [x] Cane  [] Wheelchair  [] Crutches   [] Surgical shoe    [] Wedge Shoe  [] Foam Boot(s)  [] Knee Scooter    [] Cast Boot [] CROW Boot     [] Diabetic Shoes    [] Offloading heel boot  [x] Other: open shoe    Dietary:  [x] Diet as tolerated [] Diabetic Diet [] No Added Salt  [x] Increase Protein []

## 2024-10-14 NOTE — DISCHARGE INSTRUCTIONS
Wound Clinic Physician Orders and Discharge Instructions  [facility]  [address], [address]  [place] [address]  Telephone: [phone]     FAX [phone]    NAME:  Lex Naidu  YOB: 1945  MEDICAL RECORD NUMBER:  [id number]  DATE:  10/14/2024      Return Appointment:    [] Dressing Supply Provider:   [] Home Healthcare:  [x] Return Appointment: 2 Week(s)  [] Nurse Visit:     [] Discharge from [facility]: [] Healed        [] Refer to Provider:         [] Consult    Follow-up Information:    [] Ordered Tests:  [] Vascular/Arterial Testing [] Imaging (X-ray, MRI, or CT)   [] Please call [phone] to schedule any testing    [] Referrals:    [] Infectious Disease  [] Vascular  [] Other:    [x] Rx to pharmacy: Doxycycline  [] Would Culture obtained in clinic  [] Other:     Wound Cleansing:   Do not scrub or use excessive force.  Cleanse wound prior to applying a clean dressing with:    [] Normal Saline   [x] Mild Soap & Water     [] Keep Wound Dry in Shower  [] Wear a cast cover to shower  [] Other:       Topical Treatments:  Do not apply lotions, creams, or ointments to wound bed unless directed.     [] Apply moisturizing lotion to skin surrounding the wound prior to dressing change.  [] Apply antifungal ointment to skin surrounding the wound prior to dressing change.  [] Apply thin film of moisture barrier ointment (Zinc) to skin immediately around wound.  [] Apply Betadine to skin immediately around wound   [] Apply Skin Prep to skin immediately around wound  [] Other:      Dressings:           Wound Location Right Foot    [x] Apply Primary Dressing:       [] MediHoney Gel [] MediHoney Alginate  [x] Calcium Alginate with Silver   [] Calcium Alginate without silver   [] Collagen with silver [] Collagen without Silver    [] Santyl with moist saline gauze     [] Hydrofera Blue (cut to size and moistened with normal saline)  [] Hydrofera Blue Ready (cut to size)

## 2024-10-28 ENCOUNTER — HOSPITAL ENCOUNTER (OUTPATIENT)
Facility: HOSPITAL | Age: 79
Discharge: HOME | End: 2024-10-28
Attending: SURGERY
Payer: MEDICARE

## 2024-10-28 VITALS
RESPIRATION RATE: 18 BRPM | TEMPERATURE: 98.1 F | HEART RATE: 83 BPM | SYSTOLIC BLOOD PRESSURE: 146 MMHG | DIASTOLIC BLOOD PRESSURE: 72 MMHG

## 2024-10-28 PROCEDURE — 99213 OFFICE O/P EST LOW 20 MIN: CPT

## 2024-10-28 NOTE — PROGRESS NOTES
[facility]   Wound Care and Hyperbaric Oxygen Therapy Center   Medical Staff Note     Lex Naidu  MEDICAL RECORD NUMBER:  [id number]  AGE: 79 y.o.   GENDER: male  : 1945  EPISODE DATE:  10/28/2024      Chief Complaint:   Chief Complaint   Patient presents with    Wound Check     R foot       History of Present Illness:  Mr. Lex Naidu is a 79 y.o. male who presents today for wound/ulcer evaluation.  He developed an open wound in his right lateral aspect of the foot approximately 3 weeks ago.  But he did not tell anybody about the open wound.  He was trying to do the dressing with the help of a nurse practitioner who lives next-door using hydrogen peroxide to clean the wound and apply dressing.  His wife came to know about a couple of days ago when he was trying to make an appointment at the wound center.  He has not seen his primary doctor either.  He does not know his last hemoglobin A1c level as well.  No fever or chills.  He thinks that his shoe might have caused friction and breakdown of the skin in his right lateral foot.     Wound cultures showed no specific bacterial growth.  However the patient overall feels lot better.  He completed amoxicillin.    No new issues today     History of Wound Context: Per original history and physical on this patient. Changes in history since last evaluation: As in HPI  Wound/Ulcer Pain Timing/Severity:  none  Quality of pain:  N/A  Severity:  0 / 10   Modifying Factors:  None  Associated Signs/Symptoms: None     Ulcer Identification:  Ulcer Type:  Ulceration due to breakdown of the skin from friction secondary to his footwear/shoe     Contributing Factors: edema, diabetes, poor glucose control, chronic pressure, shear force, and obesity     Wound: Right lateral foot open wound/diabetic ulcer        Past Medical History:       Diagnosis Date    Arthritis     Cancer (HCC)     colon,skin/colon - surgery/? unsure if he had chemotherapy (was

## 2024-10-28 NOTE — DISCHARGE INSTRUCTIONS
Wound Clinic Physician Orders and Discharge Instructions  [facility]  [address], [address]  [place] [address]  Telephone: [phone]     FAX [phone]    NAME:  Lex Naidu  YOB: 1945  MEDICAL RECORD NUMBER:  [id number]  DATE:  10/28/2024      Return Appointment:    [] Dressing Supply Provider:   [] Home Healthcare:  [x] Return Appointment: 2 Week(s)  [] Nurse Visit:     [] Discharge from [facility]: [] Healed        [] Refer to Provider:         [] Consult    Follow-up Information:    [] Ordered Tests:  [] Vascular/Arterial Testing [] Imaging (X-ray, MRI, or CT)   [] Please call [phone] to schedule any testing    [] Referrals:    [] Infectious Disease  [] Vascular  [] Other:    [] Rx to pharmacy:   [] Would Culture obtained in clinic  [] Other:     Wound Cleansing:   Do not scrub or use excessive force.  Cleanse wound prior to applying a clean dressing with:    [] Normal Saline   [x] Mild Soap & Water     [] Keep Wound Dry in Shower  [] Wear a cast cover to shower  [] Other:       Topical Treatments:  Do not apply lotions, creams, or ointments to wound bed unless directed.     [] Apply moisturizing lotion to skin surrounding the wound prior to dressing change.  [] Apply antifungal ointment to skin surrounding the wound prior to dressing change.  [] Apply thin film of moisture barrier ointment (Zinc) to skin immediately around wound.  [] Apply Betadine to skin immediately around wound   [] Apply Skin Prep to skin immediately around wound  [] Other:      Dressings:           Wound Location Right Foot    [x] Apply Primary Dressing:       [] MediHoney Gel [] MediHoney Alginate  [x] Calcium Alginate with Silver   [] Calcium Alginate without silver   [] Collagen with silver [] Collagen without Silver    [] Santyl with moist saline gauze     [] Hydrofera Blue (cut to size and moistened with normal saline)  [] Hydrofera Blue Ready (cut to size)      []

## 2024-10-28 NOTE — WOUND CARE
Wound Care Supplies      Supply Company:     [employer] [address] [place] [address] f: [phone] f: [phone] p: [phone] [email or web address]      Ordering Center:     [facility]  [address] [place] [address], [address] [address]    Phone: [phone]  Fax: [phone]    Patient Information:      Nabor Naidu  [address]  [place] [address]   [phone]   : 1945  AGE: 79 y.o.     GENDER: male   EPISODE DATE: 10/28/2024    Insurance:      PRIMARY INSURANCE:  Plan: MEDICARE PART A AND B  Coverage: MEDICARE  Effective Date: 2010  Group Number: [unfilled]  Subscriber Number: [id number] - (Medicare)    Payer/Plan Subscr  Sex Relation Sub. Ins. ID Effective Group Num   1.  - TRINABOR CLINE 1945 Male Self [id number] 24                                    [address]   2. MEDICARE - [place] CLINE 1945 Male Self [id number] 5/1/10                                    [address] [address]       Patient Wound Information:      Problem List Items Addressed This Visit    None      WOUNDS REQUIRING DRESSING SUPPLIES:     Wound 24 Foot Right #1 (Active)   Wound Image   10/28/24 0923   Wound Etiology Diabetic Oh 2 10/28/24 0923   Dressing Status Clean;Dry;Intact 10/28/24 0923   Wound Cleansed Cleansed with saline 10/28/24 0923   Dressing/Treatment Alginate with Ag;Dry dressing;Foam;Gauze dressing/dressing sponge;Tubular bandage 10/28/24 0951   Offloading for Diabetic Foot Ulcers Offloading ordered 10/28/24 0923   Wound Length (cm) 0.7 cm 10/28/24 0923   Wound Width (cm) 0.8 cm 10/28/24 0923   Wound Depth (cm) 0.1 cm 10/28/24 0923   Wound Surface Area (cm^2) 0.56 cm^2 10/28/24 0923   Change in Wound Size % (l*w) 56.92 10/28/24 0923   Wound Volume (cm^3) 0.056 cm^3 10/28/24 0923   Wound Healing % 57 10/28/24 0923   Post-Procedure Length (cm) 0.8 cm 10/07/24 1013   Post-Procedure Width (cm) 0.9 cm 
Other:     Activity:  [x] Activity as tolerated  [] Patient has no activity restrictions      [] Strict Bedrest   [] Remain off Work:       [] May return to full duty work                                    [] Return to work with restrictions:     Physician:  [] Dr. Cristal Magana  [] Dr. Mikki Thompson   [x] Dr. Clayton Alatorre  [] Tripp Manning PA-C  [] Dr. Donavon Torres  [] Dr. Singh Cardoso  [] Dr. Vikotria Cole    Nurse Case Manger:        Wound Care Center Information: Should you experience any significant changes in your wound(s) or have questions about your wound care, please contact the Wound Center at [phone]. Our hours are Monday - Friday 8am - 4:30pm, closed all major holidays. If you need help with your wound outside these hours and cannot wait until we are again available, contact your PCP or go to the hospital emergency room.     PLEASE NOTE: IF YOU ARE UNABLE TO OBTAIN WOUND SUPPLIES, CONTINUE TO USE THE SUPPLIES YOU HAVE AVAILABLE UNTIL YOU ARE ABLE TO REACH US. IT IS MOST IMPORTANT TO KEEP THE WOUND COVERED AT ALL TIMES.

## 2024-11-11 ENCOUNTER — HOSPITAL ENCOUNTER (OUTPATIENT)
Facility: HOSPITAL | Age: 79
Discharge: HOME | End: 2024-11-11
Attending: SURGERY
Payer: MEDICARE

## 2024-11-11 VITALS
HEART RATE: 80 BPM | TEMPERATURE: 98.4 F | RESPIRATION RATE: 18 BRPM | SYSTOLIC BLOOD PRESSURE: 141 MMHG | DIASTOLIC BLOOD PRESSURE: 77 MMHG

## 2024-11-11 DIAGNOSIS — L97.512 DIABETIC ULCER OF RIGHT FOOT ASSOCIATED WITH TYPE 1 DIABETES MELLITUS, WITH FAT LAYER EXPOSED, UNSPECIFIED PART OF FOOT (HCC): Primary | ICD-10-CM

## 2024-11-11 DIAGNOSIS — E10.621 DIABETIC ULCER OF RIGHT FOOT ASSOCIATED WITH TYPE 1 DIABETES MELLITUS, WITH FAT LAYER EXPOSED, UNSPECIFIED PART OF FOOT (HCC): Primary | ICD-10-CM

## 2024-11-11 PROCEDURE — 99213 OFFICE O/P EST LOW 20 MIN: CPT

## 2024-11-11 ASSESSMENT — PAIN DESCRIPTION - DESCRIPTORS: DESCRIPTORS: THROBBING

## 2024-11-11 ASSESSMENT — PAIN DESCRIPTION - ORIENTATION: ORIENTATION: RIGHT

## 2024-11-11 ASSESSMENT — PAIN SCALES - GENERAL: PAINLEVEL_OUTOF10: 4

## 2024-11-11 ASSESSMENT — PAIN DESCRIPTION - LOCATION: LOCATION: FOOT

## 2024-11-11 NOTE — PROGRESS NOTES
[facility]   Wound Care and Hyperbaric Oxygen Therapy Center   Medical Staff Note     Lex Naidu  MEDICAL RECORD NUMBER:  [id number]  AGE: 79 y.o.   GENDER: male  : 1945  EPISODE DATE:  2024    Chief Complaint:   Chief Complaint   Patient presents with    Wound Check     Right foot wound        History of Present Illness:  Mr. Lex Naidu is a 79 y.o. male who presents today for wound/ulcer evaluation.  He developed an open wound in his right lateral aspect of the foot approximately 3 weeks ago.  But he did not tell anybody about the open wound.  He was trying to do the dressing with the help of a nurse practitioner who lives next-door using hydrogen peroxide to clean the wound and apply dressing.  His wife came to know about a couple of days ago when he was trying to make an appointment at the wound center.  He has not seen his primary doctor either.  He does not know his last hemoglobin A1c level as well.  No fever or chills.  He thinks that his shoe might have caused friction and breakdown of the skin in his right lateral foot.     Wound cultures showed no specific bacterial growth.  However the patient overall feels lot better.  He completed amoxicillin.     No new issues today     History of Wound Context: Per original history and physical on this patient. Changes in history since last evaluation: As in HPI  Wound/Ulcer Pain Timing/Severity:  none  Quality of pain:  N/A  Severity:  0 / 10   Modifying Factors:  None  Associated Signs/Symptoms: None     Ulcer Identification:  Ulcer Type:  Ulceration due to breakdown of the skin from friction secondary to his footwear/shoe     Contributing Factors: edema, diabetes, poor glucose control, chronic pressure, shear force, and obesity     Wound: Right lateral foot open wound/diabetic ulcer   Past Medical History:       Diagnosis Date    Arthritis     Cancer (HCC)     colon,skin/colon - surgery/? unsure if he had chemotherapy

## 2024-11-11 NOTE — WOUND CARE
Wound Clinic Physician Orders and Discharge Instructions  [facility]  [address], [address]  [place] [address]  Telephone: [phone]     FAX [phone]    NAME:  Lex Naidu  YOB: 1945  MEDICAL RECORD NUMBER:  [id number]  DATE:  11/11/2024      Return Appointment:    [] Dressing Supply Provider: Joon  [] Home Healthcare:  [x] Return Appointment: 2 Week(s)  [] Nurse Visit:     [] Discharge from [facility]: [] Healed        [] Refer to Provider:         [] Consult    Follow-up Information:    [] Ordered Tests:  [] Vascular/Arterial Testing [] Imaging (X-ray, MRI, or CT)   [] Please call [phone] to schedule any testing    [] Referrals:    [] Infectious Disease  [] Vascular  [] Other:    [] Rx to pharmacy:   [] Would Culture obtained in clinic  [] Other:     Wound Cleansing:   Do not scrub or use excessive force.  Cleanse wound prior to applying a clean dressing with:    [] Normal Saline   [x] Mild Soap & Water     [] Keep Wound Dry in Shower  [] Wear a cast cover to shower  [] Other:       Topical Treatments:  Do not apply lotions, creams, or ointments to wound bed unless directed.     [] Apply moisturizing lotion to skin surrounding the wound prior to dressing change.  [] Apply antifungal ointment to skin surrounding the wound prior to dressing change.  [] Apply thin film of moisture barrier ointment (Zinc) to skin immediately around wound.  [] Apply Betadine to skin immediately around wound   [] Apply Skin Prep to skin immediately around wound  [] Other:      Dressings:           Wound Location Right Foot    [x] Apply Primary Dressing:       [] MediHoney Gel [] MediHoney Alginate  [x] Calcium Alginate with Silver   [] Calcium Alginate without silver   [] Collagen with silver [] Collagen without Silver    [] Santyl with moist saline gauze     [] Hydrofera Blue (cut to size and moistened with normal saline)  [] Hydrofera Blue Ready (cut to size)

## 2024-11-11 NOTE — DISCHARGE INSTRUCTIONS
Other:     Activity:  [x] Activity as tolerated  [] Patient has no activity restrictions      [] Strict Bedrest   [] Remain off Work:       [] May return to full duty work                                    [] Return to work with restrictions:     Physician:  [] Dr. Cristal Magana  [] Dr. Mikki Thompson   [x] Dr. Clayton Alatorre  [] Tripp Manning PA-C  [] Dr. Donavon Torres  [] Dr. Singh Cardoso  [] Dr. Viktoria Cole    Nurse Case Manger:        Wound Care Center Information: Should you experience any significant changes in your wound(s) or have questions about your wound care, please contact the Wound Center at [phone]. Our hours are Monday - Friday 8am - 4:30pm, closed all major holidays. If you need help with your wound outside these hours and cannot wait until we are again available, contact your PCP or go to the hospital emergency room.     PLEASE NOTE: IF YOU ARE UNABLE TO OBTAIN WOUND SUPPLIES, CONTINUE TO USE THE SUPPLIES YOU HAVE AVAILABLE UNTIL YOU ARE ABLE TO REACH US. IT IS MOST IMPORTANT TO KEEP THE WOUND COVERED AT ALL TIMES.

## 2024-12-02 ENCOUNTER — HOSPITAL ENCOUNTER (OUTPATIENT)
Facility: HOSPITAL | Age: 79
End: 2024-12-02
Attending: SURGERY
Payer: MEDICARE

## 2024-12-02 VITALS
TEMPERATURE: 97.4 F | RESPIRATION RATE: 18 BRPM | SYSTOLIC BLOOD PRESSURE: 157 MMHG | HEART RATE: 68 BPM | DIASTOLIC BLOOD PRESSURE: 84 MMHG

## 2024-12-02 DIAGNOSIS — L97.512 DIABETIC ULCER OF RIGHT FOOT ASSOCIATED WITH TYPE 1 DIABETES MELLITUS, WITH FAT LAYER EXPOSED, UNSPECIFIED PART OF FOOT (HCC): Primary | ICD-10-CM

## 2024-12-02 DIAGNOSIS — E10.621 DIABETIC ULCER OF RIGHT FOOT ASSOCIATED WITH TYPE 1 DIABETES MELLITUS, WITH FAT LAYER EXPOSED, UNSPECIFIED PART OF FOOT (HCC): Primary | ICD-10-CM

## 2024-12-02 PROCEDURE — 11102 TANGNTL BX SKIN SINGLE LES: CPT

## 2024-12-02 PROCEDURE — 88341 IMHCHEM/IMCYTCHM EA ADD ANTB: CPT

## 2024-12-02 PROCEDURE — 88305 TISSUE EXAM BY PATHOLOGIST: CPT

## 2024-12-02 PROCEDURE — 88342 IMHCHEM/IMCYTCHM 1ST ANTB: CPT

## 2024-12-02 RX ORDER — LIDOCAINE HYDROCHLORIDE 10 MG/ML
10 INJECTION, SOLUTION EPIDURAL; INFILTRATION; INTRACAUDAL; PERINEURAL ONCE
Status: DISPENSED | OUTPATIENT
Start: 2024-12-02

## 2024-12-02 NOTE — DISCHARGE INSTRUCTIONS
Wound Clinic Physician Orders and Discharge Instructions  [facility]  [address], [address]  [place] [address]  Telephone: [phone]     FAX [phone]    NAME:  Lex Naidu  YOB: 1945  MEDICAL RECORD NUMBER:  [id number]  DATE:  12/2/2024      Return Appointment:    [x] Dressing Supply Provider: Joon  [] Home Healthcare:  [x] Return Appointment: 1 Week(s)  [] Nurse Visit:     [] Discharge from [facility]: [] Healed        [] Refer to Provider:         [] Consult    Follow-up Information:    [] Ordered Tests:  [] Vascular/Arterial Testing [] Imaging (X-ray, MRI, or CT)   [] Please call [phone] to schedule any testing    [] Referrals:    [] Infectious Disease  [] Vascular  [] Other:    [] Rx to pharmacy:   [x] Would Culture obtained in clinic: Biopsy obtained  [] Other:     Wound Cleansing:   Do not scrub or use excessive force.  Cleanse wound prior to applying a clean dressing with:    [] Normal Saline   [x] Mild Soap & Water     [] Keep Wound Dry in Shower  [] Wear a cast cover to shower  [] Other:       Topical Treatments:  Do not apply lotions, creams, or ointments to wound bed unless directed.     [] Apply moisturizing lotion to skin surrounding the wound prior to dressing change.  [] Apply antifungal ointment to skin surrounding the wound prior to dressing change.  [] Apply thin film of moisture barrier ointment (Zinc) to skin immediately around wound.  [] Apply Betadine to skin immediately around wound   [] Apply Skin Prep to skin immediately around wound  [] Other:      Dressings:           Wound Location Right Foot    [x] Apply Primary Dressing:       [] MediHoney Gel [] MediHoney Alginate  [x] Calcium Alginate with Silver   [] Calcium Alginate without silver   [] Collagen with silver [] Collagen without Silver    [] Santyl with moist saline gauze     [] Hydrofera Blue (cut to size and moistened with normal saline)  [] Hydrofera Blue Ready (cut to

## 2024-12-02 NOTE — PROGRESS NOTES
[facility]   Wound Care and Hyperbaric Oxygen Therapy Center   Medical Staff Note     Lex Naidu  MEDICAL RECORD NUMBER:  [id number]  AGE: 79 y.o.   GENDER: male  : 1945  EPISODE DATE:  2024      Chief Complaint:   Chief Complaint   Patient presents with    Wound Check     Right foot wound        History of Present Illness:     Lex Naidu is a 79 y.o. male who presents today for wound/ulcer evaluation.     History of Wound Context: Per original history and physical on this patient. Changes in history since last evaluation: ***  Wound/Ulcer Pain Timing/Severity: {PAIN ASSESSMENT WOUND:97508:::1}  Quality of pain: {PAIN QUALITY:65786:::1}  Severity:  {NUMBERS; 0-10:5044} / 10   Modifying Factors: {Modifying Factors Wound:015841606:::1}  Associated Signs/Symptoms: {ASSOCIATED SYMPTOMS WOUND:872509435:::1}    Ulcer Identification:  Ulcer Type: {Wound type:91306:::1}    Contributing Factors: {HEALTH FACTORS:484726654:::1}    Wound: ***     Past Medical History:       Diagnosis Date    Arthritis     Cancer (HCC)     colon,skin/colon - surgery/? unsure if he had chemotherapy (was experimental and he had a port)    Diabetes (HCC)     GERD (gastroesophageal reflux disease)     Gout     Hyperlipidemia     Hypertension     Ill-defined condition     obesity   BMI= 35.9 om 2016       Past Surgical History:   Past Surgical History:   Procedure Laterality Date    BACK SURGERY      BRAIN SURGERY      for tremors    COLONOSCOPY      multiple    COLONOSCOPY N/A 04/10/2024    COLONOSCOPY performed by Gerald Lott MD at [facility] ENDOSCOPY    COLONOSCOPY N/A 04/10/2024    COLONOSCOPY BIOPSY performed by Gerald Lott MD at [facility] ENDOSCOPY    HEENT Bilateral     Lasik    MALIGNANT SKIN LESION EXCISION      unsure of type of cancer    ORTHOPEDIC SURGERY Left     shoulder reconstruction and replacement    OTHER SURGICAL HISTORY      brain surgery for tremors    PORT SURGERY      port placement -

## 2024-12-02 NOTE — WOUND CARE
Wound Clinic Physician Orders and Discharge Instructions  [facility]  [address], [address]  [place] [address]  Telephone: [phone]     FAX [phone]    NAME:  eLx Naidu  YOB: 1945  MEDICAL RECORD NUMBER:  [id number]  DATE:  12/2/2024      Return Appointment:    [x] Dressing Supply Provider: Joon  [] Home Healthcare:  [x] Return Appointment: 1 Week(s)  [] Nurse Visit:     [] Discharge from [facility]: [] Healed        [] Refer to Provider:         [] Consult    Follow-up Information:    [] Ordered Tests:  [] Vascular/Arterial Testing [] Imaging (X-ray, MRI, or CT)   [] Please call [phone] to schedule any testing    [] Referrals:    [] Infectious Disease  [] Vascular  [] Other:    [] Rx to pharmacy:   [x] Would Culture obtained in clinic: Biopsy obtained  [] Other:     Wound Cleansing:   Do not scrub or use excessive force.  Cleanse wound prior to applying a clean dressing with:    [] Normal Saline   [x] Mild Soap & Water     [] Keep Wound Dry in Shower  [] Wear a cast cover to shower  [] Other:       Topical Treatments:  Do not apply lotions, creams, or ointments to wound bed unless directed.     [] Apply moisturizing lotion to skin surrounding the wound prior to dressing change.  [] Apply antifungal ointment to skin surrounding the wound prior to dressing change.  [] Apply thin film of moisture barrier ointment (Zinc) to skin immediately around wound.  [] Apply Betadine to skin immediately around wound   [] Apply Skin Prep to skin immediately around wound  [] Other:      Dressings:           Wound Location Right Foot    [x] Apply Primary Dressing:       [] MediHoney Gel [] MediHoney Alginate  [x] Calcium Alginate with Silver   [] Calcium Alginate without silver   [] Collagen with silver [] Collagen without Silver    [] Santyl with moist saline gauze     [] Hydrofera Blue (cut to size and moistened with normal saline)  [] Hydrofera Blue Ready 
(cm) 0.8 cm 10/07/24 1013   Post-Procedure Width (cm) 0.9 cm 10/07/24 1013   Post-Procedure Depth (cm) 0.3 cm 10/07/24 1013   Post-Procedure Surface Area (cm^2) 0.72 cm^2 10/07/24 1013   Post-Procedure Volume (cm^3) 0.216 cm^3 10/07/24 1013   Wound Assessment Pale granulation tissue;Hyper granulation tissue 12/02/24 0933   Drainage Amount Moderate (25-50%) 12/02/24 0933   Drainage Description Serosanguinous;Yellow 12/02/24 0933   Odor None 12/02/24 0933   Noemi-wound Assessment Maceration 12/02/24 0933   Margins Attached edges 12/02/24 0933   Number of days: 105          Supplies Requested :      WOUND #: 1   PRIMARY DRESSING:  Alginate with silver pad    Cover and Secure with: 4X4 gauze pad  Conforming roll gauze  Other tubigrip size F , border gauze     FREQUENCY OF DRESSING CHANGES:  Every other day          ADDITIONAL ITEMS:  [x] Gloves:   [] Small [x] Medium [] Large  [x] Tape 4\" Medipore  [x] Normal Saline  [x] Skin Prep   [] Adhesive Remover   [] Cotton/Foam Tip Applicators  [] Tongue Depressor   [] Other:    Patient Wound(s) Debrided: [x] Yes. Date last debrided  10/7/2024   [] No    Debribement Type: Excisional/Sharp    Patient currently being seen by Home Health: [] Yes   [x] No    Duration for needed supplies:  []15  [x]30  []60  []90 Days    Electronically signed by Angela Cormier RN on 12/2/2024 at 10:19 AM     Provider Information:      PROVIDER'S NAME:  Clayton Alatorre MD

## 2024-12-02 NOTE — PROCEDURES
Biopsy Procedure Note    Biopsy Type: Incisional Biopsy of skin single lesion (wedge including simple closure, when performed).    Performed by: Clayton Alatorre MD  Consent obtained: Yes  Time out taken: Yes  Pain Control: Anesthetic: 1% Lidocaine Injectable    Location of Biopsy:  Wound/Ulcer Number(s)  Wound/Ulcer # 1  Instrument(s) used to perform Biopsy: #11 surgical blade and tissue nippers  Specimen sent to Pathology:Yes  Total number of Biopsy Specimen: 1   Estimated Blood Loss at Biopsy site(s): Minimal amount blood loss   Hemostasis Achieved: by pressure  Procedural Pain: 0  / 10   Post Procedural Pain: 0 / 10   Response to Biopsy: Patient tolerated biopsy procedure well without issue.

## 2024-12-09 ENCOUNTER — HOSPITAL ENCOUNTER (OUTPATIENT)
Facility: HOSPITAL | Age: 79
Discharge: HOME | End: 2024-12-09
Attending: SURGERY
Payer: MEDICARE

## 2024-12-09 VITALS
HEART RATE: 77 BPM | SYSTOLIC BLOOD PRESSURE: 149 MMHG | TEMPERATURE: 97.4 F | RESPIRATION RATE: 18 BRPM | DIASTOLIC BLOOD PRESSURE: 70 MMHG

## 2024-12-09 DIAGNOSIS — C44.712 BASAL CELL CARCINOMA (BCC) OF SKIN OF RIGHT LOWER EXTREMITY INCLUDING HIP: ICD-10-CM

## 2024-12-09 DIAGNOSIS — L97.512 DIABETIC ULCER OF RIGHT FOOT ASSOCIATED WITH TYPE 1 DIABETES MELLITUS, WITH FAT LAYER EXPOSED, UNSPECIFIED PART OF FOOT (HCC): Primary | ICD-10-CM

## 2024-12-09 DIAGNOSIS — E10.621 DIABETIC ULCER OF RIGHT FOOT ASSOCIATED WITH TYPE 1 DIABETES MELLITUS, WITH FAT LAYER EXPOSED, UNSPECIFIED PART OF FOOT (HCC): Primary | ICD-10-CM

## 2024-12-09 PROBLEM — C44.91 BASAL CELL CARCINOMA: Status: ACTIVE | Noted: 2024-12-09

## 2024-12-09 PROCEDURE — 99213 OFFICE O/P EST LOW 20 MIN: CPT

## 2024-12-09 ASSESSMENT — PAIN DESCRIPTION - LOCATION: LOCATION: BACK

## 2024-12-09 ASSESSMENT — PAIN SCALES - GENERAL: PAINLEVEL_OUTOF10: 2

## 2024-12-09 NOTE — WOUND CARE
Wound Clinic Physician Orders and Discharge Instructions  [facility]  [address], [address]  [place] [address]  Telephone: [phone]     FAX [phone]    NAME:  Lex Naidu  YOB: 1945  MEDICAL RECORD NUMBER:  [id number]  DATE:  12/9/2024      Return Appointment:    [] Dressing Supply Provider: Joon  [] Home Healthcare:  [x] Return Appointment: 2 Week(s)   [] Nurse Visit:     [] Discharge from [facility]: [] Healed        [] Refer to Provider:         [] Consult    Follow-up Information:    [] Ordered Tests:  [] Vascular/Arterial Testing [] Imaging (X-ray, MRI, or CT)   [] Please call [phone] to schedule any testing    [x] Referrals:    [] Infectious Disease  [] Vascular  [x] Other: Dermatology    [] Rx to pharmacy:   [] Would Culture obtained in clinic:  [] Other:     Wound Cleansing:   Do not scrub or use excessive force.  Cleanse wound prior to applying a clean dressing with:    [] Normal Saline   [x] Mild Soap & Water     [] Keep Wound Dry in Shower  [] Wear a cast cover to shower  [] Other:       Topical Treatments:  Do not apply lotions, creams, or ointments to wound bed unless directed.     [] Apply moisturizing lotion to skin surrounding the wound prior to dressing change.  [] Apply antifungal ointment to skin surrounding the wound prior to dressing change.  [] Apply thin film of moisture barrier ointment (Zinc) to skin immediately around wound.  [] Apply Betadine to skin immediately around wound   [] Apply Skin Prep to skin immediately around wound  [] Other:      Dressings:           Wound Location Right Foot    [x] Apply Primary Dressing:       [] MediHoney Gel [] MediHoney Alginate  [x] Calcium Alginate with Silver   [] Calcium Alginate without silver   [] Collagen with silver [] Collagen without Silver    [] Santyl with moist saline gauze     [] Hydrofera Blue (cut to size and moistened with normal saline)  [] Hydrofera Blue Ready (cut

## 2024-12-09 NOTE — DISCHARGE INSTRUCTIONS
Wound Clinic Physician Orders and Discharge Instructions  [facility]  [address]  [place] [address]  Telephone: [phone]     FAX [phone]    NAME:  Lex Naidu  YOB: 1945  MEDICAL RECORD NUMBER:  [id number]  DATE:  12/9/2024      Return Appointment:    [] Dressing Supply Provider: Joon  [] Home Healthcare:  [x] Return Appointment: 2 Week(s)   [] Nurse Visit:     [] Discharge from [facility]: [] Healed        [] Refer to Provider:         [] Consult    Follow-up Information:    [] Ordered Tests:  [] Vascular/Arterial Testing [] Imaging (X-ray, MRI, or CT)   [] Please call [phone] to schedule any testing    [x] Referrals:    [] Infectious Disease  [] Vascular  [x] Other: Dermatology    [] Rx to pharmacy:   [] Would Culture obtained in clinic:  [] Other:     Wound Cleansing:   Do not scrub or use excessive force.  Cleanse wound prior to applying a clean dressing with:    [] Normal Saline   [x] Mild Soap & Water     [] Keep Wound Dry in Shower  [] Wear a cast cover to shower  [] Other:       Topical Treatments:  Do not apply lotions, creams, or ointments to wound bed unless directed.     [] Apply moisturizing lotion to skin surrounding the wound prior to dressing change.  [] Apply antifungal ointment to skin surrounding the wound prior to dressing change.  [] Apply thin film of moisture barrier ointment (Zinc) to skin immediately around wound.  [] Apply Betadine to skin immediately around wound   [] Apply Skin Prep to skin immediately around wound  [] Other:      Dressings:           Wound Location Right Foot    [x] Apply Primary Dressing:       [] MediHoney Gel [] MediHoney Alginate  [x] Calcium Alginate with Silver   [] Calcium Alginate without silver   [] Collagen with silver [] Collagen without Silver    [] Santyl with moist saline gauze     [] Hydrofera Blue (cut to size and moistened with normal saline)  [] Hydrofera Blue Ready (cut

## 2024-12-09 NOTE — WOUND CARE
Spoke with Joon regarding patient not getting all of his supplies. She stated it was because the wound was partial thickness and medicare did not cover. I informed her until today the wound was classified as a viera grade 2 which is a full thickness wound. She stated she would resubmit for review and send out the calcium alginate.

## 2024-12-09 NOTE — WOUND CARE
Biopsy reviewed with Dr. Alatorre and patient will be following up with dermatology as they have removed all of his skin cancer spots. Copy of pathology report provided

## 2024-12-09 NOTE — PROGRESS NOTES
communicating results to the patient/family/caregiver  Care coordination (not reported separately)    Electronically signed by Clayton Alatorre MD on 12/9/2024 at 11:32 AM

## 2024-12-16 ENCOUNTER — HOSPITAL ENCOUNTER (OUTPATIENT)
Facility: HOSPITAL | Age: 79
Discharge: HOME | End: 2024-12-16
Attending: SURGERY
Payer: MEDICARE

## 2024-12-16 VITALS
SYSTOLIC BLOOD PRESSURE: 194 MMHG | TEMPERATURE: 98.1 F | HEART RATE: 55 BPM | RESPIRATION RATE: 18 BRPM | DIASTOLIC BLOOD PRESSURE: 85 MMHG

## 2024-12-16 DIAGNOSIS — L97.512 DIABETIC ULCER OF RIGHT FOOT ASSOCIATED WITH TYPE 1 DIABETES MELLITUS, WITH FAT LAYER EXPOSED, UNSPECIFIED PART OF FOOT (HCC): Primary | ICD-10-CM

## 2024-12-16 DIAGNOSIS — C44.712 BASAL CELL CARCINOMA (BCC) OF SKIN OF RIGHT LOWER EXTREMITY INCLUDING HIP: ICD-10-CM

## 2024-12-16 DIAGNOSIS — E10.621 DIABETIC ULCER OF RIGHT FOOT ASSOCIATED WITH TYPE 1 DIABETES MELLITUS, WITH FAT LAYER EXPOSED, UNSPECIFIED PART OF FOOT (HCC): Primary | ICD-10-CM

## 2024-12-16 PROCEDURE — 99213 OFFICE O/P EST LOW 20 MIN: CPT

## 2024-12-16 NOTE — PROGRESS NOTES
[facility]   Wound Care and Hyperbaric Oxygen Therapy Center   Medical Staff Note     Lex Naidu  MEDICAL RECORD NUMBER:  [id number]  AGE: 79 y.o.   GENDER: male  : 1945  EPISODE DATE:  2024      Chief Complaint:   Chief Complaint   Patient presents with    Wound Check     Right foot       History of Present Illness:     Lex Naidu is a 79 y.o. male who presents today for wound/ulcer evaluation.  He has right lateral foot basal cell carcinoma.  They are awaiting to see his dermatologist Dr. Guillory who is a [facility] surgeon who is currently out of country.    History of Wound Context: Per original history and physical on this patient. Changes in history since last evaluation: As in HPI    Ulcer Identification:  Ulcer Type:  Malignant ulcer, basal cell carcinoma    Contributing Factors: HEALTH FACTORS: none    Wound: Right lateral foot basal cell carcinoma    Past Medical History:       Diagnosis Date    Arthritis     Cancer (HCC)     colon,skin/colon - surgery/? unsure if he had chemotherapy (was experimental and he had a port)    Diabetes (HCC)     GERD (gastroesophageal reflux disease)     Gout     Hyperlipidemia     Hypertension     Ill-defined condition     obesity   BMI= 35.9 om 2016       Past Surgical History:   Past Surgical History:   Procedure Laterality Date    BACK SURGERY      BRAIN SURGERY      for tremors    COLONOSCOPY      multiple    COLONOSCOPY N/A 04/10/2024    COLONOSCOPY performed by Gerald Lott MD at [facility] ENDOSCOPY    COLONOSCOPY N/A 04/10/2024    COLONOSCOPY BIOPSY performed by Gerald Lott MD at [facility] ENDOSCOPY    HEENT Bilateral     Lasik    MALIGNANT SKIN LESION EXCISION      unsure of type of cancer    ORTHOPEDIC SURGERY Left     shoulder reconstruction and replacement    OTHER SURGICAL HISTORY      brain surgery for tremors    PORT SURGERY      port placement - then removed    TOTAL COLECTOMY      pt think it was only a partial colon

## 2024-12-16 NOTE — WOUND CARE
to size)      [] Normal Saline wet to dry  [] Betadine wet to dry    [] Hydrogel       [] Bactroban/Mupirocin applied 1st in clinic   [] Iodoform Packing Strip [] Plain Packing Strip   [] Skin Sub, Steri-Strips, Mepitel (DO NOT REMOVE)  [] Other:      [x] Cover and Secure with:     [x] Gauze [x] Katty or bandage [] Kerlix   [] Zetuvit Plus Silicone Border or Foam Border [] Super Absorbant [] ABD     [] Ace Wrap [x] Other: Foam   Limit contact of tape with skin.    [x] Change dressing: [] Daily    [] Every Other Day   [] Twice per week   [x] Three times per week   [] Once a week [] Do Not Change Dressing   [] Other:     Pressure Relief:  [] When sitting, shift position or do seat lifts every 15 minutes.  [] Wheelchair cushion [] Specialty Bed/Mattress  [x] Avoid direct pressure on wound site.  [] Other     Edema Control:  Apply: [] Compression Stocking:  mmHg  []Right Leg []Left Leg   [x] Tubigrip []Right Leg Double Layer []Left Leg Double Layer      [x]Right Leg Single Layer []Left Leg Single Layer      [x] Elevate leg(s) above the level of the heart when sitting.    [x] Avoid prolonged standing in one place.     Off-Loading:   [x] Off-loading when: [x] walking  [] in bed [x] sitting  [] Total non-weight bearing  [] Right Leg  [] Left Leg   [x] Assistive Device [] Walker [x] Cane  [] Wheelchair  [] Crutches   [] Surgical shoe    [] Wedge Shoe  [] Foam Boot(s)  [] Knee Scooter    [] Cast Boot [] CROW Boot     [] Diabetic Shoes    [] Offloading heel boot  [x] Other: open shoe    Dietary:  [x] Diet as tolerated [] Diabetic Diet [] No Added Salt  [x] Increase Protein [] Other:     Activity:  [x] Activity as tolerated  [] Patient has no activity restrictions      [] Strict Bedrest   [] Remain off Work:       [] May return to full duty work                                    [] Return to work with restrictions:     Physician:  [] Dr. Cristal Magana  [] Dr. Mikki Thompson   [x] Dr. Clayton Alatorre  [] Tripp Manning,

## 2025-01-13 ENCOUNTER — HOSPITAL ENCOUNTER (OUTPATIENT)
Facility: HOSPITAL | Age: 80
Discharge: HOME | End: 2025-01-13
Attending: SURGERY
Payer: MEDICARE

## 2025-01-13 VITALS
HEART RATE: 68 BPM | RESPIRATION RATE: 18 BRPM | TEMPERATURE: 97.1 F | DIASTOLIC BLOOD PRESSURE: 78 MMHG | SYSTOLIC BLOOD PRESSURE: 182 MMHG

## 2025-01-13 PROCEDURE — 99213 OFFICE O/P EST LOW 20 MIN: CPT

## 2025-01-13 NOTE — WOUND CARE
Wound Care Supplies      Supply Company:     [employer] [address] [place] [address] f: [phone] f: [phone] p: [phone] [email or web address]      Ordering Center:     [facility]  [address] [place] [address], [address] [address]    Phone: [phone]  Fax: [phone]    Patient Information:      Nabor Naidu  [address]  [facility] [address]   [phone]   : 1945  AGE: 79 y.o.     GENDER: male   EPISODE DATE: 2025    Insurance:      PRIMARY INSURANCE:  Plan: MEDICARE PART A AND B  Coverage: MEDICARE  Effective Date: 2010  Group Number: [unfilled]  Subscriber Number: [id number] - (Medicare)    Payer/Plan Subscr  Sex Relation Sub. Ins. ID Effective Group Num   1.  - TRIN[place]OR CLINE 1945 Male Self [id number]                                    [address]   2. MEDICARE - [place] CLINE 1945 Male Self [id number] 5/1/10                                    [address]        Patient Wound Information:      Problem List Items Addressed This Visit    None      WOUNDS REQUIRING DRESSING SUPPLIES:     Wound 24 Foot Right #1 (Active)   Wound Image   25   Wound Etiology Other 25   Dressing Status Clean;Dry;Intact 25   Wound Cleansed Cleansed with saline 25 09   Dressing/Treatment Alginate with Ag;Gauze dressing/dressing sponge;Dry dressing;Tubular bandage 24 1145   Offloading for Diabetic Foot Ulcers Offloading ordered 24 0933   Wound Length (cm) 1.1 cm 25 09   Wound Width (cm) 1.1 cm 25   Wound Depth (cm) 0.1 cm 25   Wound Surface Area (cm^2) 1.21 cm^2 25   Change in Wound Size % (l*w) 6.92 25 0931   Wound Volume (cm^3) 0.121 cm^3 2531   Wound Healing % 7 2531   Post-Procedure Length (cm) 0.8 cm 10/07/24 1013   Post-Procedure Width (cm) 0.9 cm 10/07/24 1013

## 2025-01-13 NOTE — DISCHARGE INSTRUCTIONS
[place]  [] Tripp Manning PA-C  [] Dr. Donavon Torres  [] Dr. Singh Cardoso  [] Dr. Viktoria Cole    Nurse Case Manger:        Wound Care Center Information: Should you experience any significant changes in your wound(s) or have questions about your wound care, please contact the Wound Center at [phone]. Our hours are Monday - Friday 8am - 4:30pm, closed all major holidays. If you need help with your wound outside these hours and cannot wait until we are again available, contact your PCP or go to the hospital emergency room.     PLEASE NOTE: IF YOU ARE UNABLE TO OBTAIN WOUND SUPPLIES, CONTINUE TO USE THE SUPPLIES YOU HAVE AVAILABLE UNTIL YOU ARE ABLE TO REACH US. IT IS MOST IMPORTANT TO KEEP THE WOUND COVERED AT ALL TIMES.   No

## 2025-01-13 NOTE — PROGRESS NOTES
silver   [] Collagen with silver [] Collagen without Silver    [] Santyl with moist saline gauze     [] Hydrofera Blue (cut to size and moistened with normal saline)  [] Hydrofera Blue Ready (cut to size)      [] Normal Saline wet to dry  [] Betadine wet to dry    [] Hydrogel       [] Bactroban/Mupirocin applied 1st in clinic   [] Iodoform Packing Strip [] Plain Packing Strip   [] Skin Sub, Steri-Strips, Mepitel (DO NOT REMOVE)  [] Other:      [x] Cover and Secure with:     [x] Gauze [x] Katty or bandage [] Kerlix   [] Zetuvit Plus Silicone Border or Foam Border [] Super Absorbant [] ABD     [] Ace Wrap [x] Other: Foam   Limit contact of tape with skin.    [x] Change dressing: [] Daily    [] Every Other Day   [] Twice per week   [x] Three times per week   [] Once a week [] Do Not Change Dressing   [] Other:     Pressure Relief:  [] When sitting, shift position or do seat lifts every 15 minutes.  [] Wheelchair cushion [] Specialty Bed/Mattress  [x] Avoid direct pressure on wound site.  [] Other     Edema Control:  Apply: [] Compression Stocking:  mmHg  []Right Leg []Left Leg   [x] Tubigrip []Right Leg Double Layer []Left Leg Double Layer      [x]Right Leg Single Layer []Left Leg Single Layer      [x] Elevate leg(s) above the level of the heart when sitting.    [x] Avoid prolonged standing in one place.     Off-Loading:   [x] Off-loading when: [x] walking  [] in bed [x] sitting  [] Total non-weight bearing  [] Right Leg  [] Left Leg   [x] Assistive Device [] Walker [x] Cane  [] Wheelchair  [] Crutches   [] Surgical shoe    [] Wedge Shoe  [] Foam Boot(s)  [] Knee Scooter    [] Cast Boot [] CROW Boot     [] Diabetic Shoes    [] Offloading heel boot  [x] Other: open shoe    Dietary:  [x] Diet as tolerated [] Diabetic Diet [] No Added Salt  [x] Increase Protein [] Other:     Activity:  [x] Activity as tolerated  [] Patient has no activity restrictions      [] Strict Bedrest   [] Remain off Work:       [] May return to

## 2025-01-13 NOTE — WOUND CARE
Wound Clinic Physician Orders and Discharge Instructions  [facility]  [address], [address]  [place] [address]  Telephone: [phone]     FAX [phone]    NAME:  Lex Naidu  YOB: 1945  MEDICAL RECORD NUMBER:  [id number]  DATE:  1/13/2025      Return Appointment:    [x] Dressing Supply Provider: Joon  [] Home Healthcare:  [x] Return Appointment: 3 Week(s)   [] Nurse Visit:     [] Discharge from [facility]: [] Healed        [] Refer to Provider:         [] Consult    Follow-up Information:    [] Ordered Tests:  [] Vascular/Arterial Testing [] Imaging (X-ray, MRI, or CT)   [] Please call [phone] to schedule any testing    [x] Referrals:    [] Infectious Disease  [] Vascular  [x] Other: Dermatology 4/7/25    [] Rx to pharmacy:   [] Would Culture obtained in clinic:  [x] Other: Discussed OR for excision    Wound Cleansing:   Do not scrub or use excessive force.  Cleanse wound prior to applying a clean dressing with:    [] Normal Saline   [x] Mild Soap & Water     [] Keep Wound Dry in Shower  [] Wear a cast cover to shower  [] Other:       Topical Treatments:  Do not apply lotions, creams, or ointments to wound bed unless directed.     [] Apply moisturizing lotion to skin surrounding the wound prior to dressing change.  [] Apply antifungal ointment to skin surrounding the wound prior to dressing change.  [] Apply thin film of moisture barrier ointment (Zinc) to skin immediately around wound.  [] Apply Betadine to skin immediately around wound   [] Apply Skin Prep to skin immediately around wound  [] Other:      Dressings:           Wound Location Right Foot    [x] Apply Primary Dressing:       [] MediHoney Gel [] MediHoney Alginate  [x] Calcium Alginate with Silver   [] Calcium Alginate without silver   [] Collagen with silver [] Collagen without Silver    [] Santyl with moist saline gauze     [] Hydrofera Blue (cut to size and moistened with normal

## 2025-01-21 ENCOUNTER — HOSPITAL ENCOUNTER (OUTPATIENT)
Facility: HOSPITAL | Age: 80
Discharge: HOME | End: 2025-01-24
Payer: MEDICARE

## 2025-01-21 LAB
ALBUMIN SERPL-MCNC: 3.9 G/DL (ref 3.5–5)
ALBUMIN/GLOB SERPL: 1 (ref 1.1–2.2)
ALP SERPL-CCNC: 83 U/L (ref 45–117)
ALT SERPL-CCNC: 38 U/L (ref 12–78)
ANION GAP SERPL CALC-SCNC: 7 MMOL/L (ref 2–12)
AST SERPL W P-5'-P-CCNC: 31 U/L (ref 15–37)
BILIRUB SERPL-MCNC: 0.4 MG/DL (ref 0.2–1)
BUN SERPL-MCNC: 30 MG/DL (ref 6–20)
BUN/CREAT SERPL: 19 (ref 12–20)
CA-I BLD-MCNC: 9.5 MG/DL (ref 8.5–10.1)
CHLORIDE SERPL-SCNC: 106 MMOL/L (ref 97–108)
CO2 SERPL-SCNC: 26 MMOL/L (ref 21–32)
CREAT SERPL-MCNC: 1.61 MG/DL (ref 0.7–1.3)
EKG ATRIAL RATE: 75 BPM
EKG DIAGNOSIS: NORMAL
EKG P AXIS: 17 DEGREES
EKG P-R INTERVAL: 178 MS
EKG Q-T INTERVAL: 372 MS
EKG QRS DURATION: 120 MS
EKG QTC CALCULATION (BAZETT): 415 MS
EKG R AXIS: -61 DEGREES
EKG T AXIS: 31 DEGREES
EKG VENTRICULAR RATE: 75 BPM
ERYTHROCYTE [DISTWIDTH] IN BLOOD BY AUTOMATED COUNT: 13.2 % (ref 11.5–14.5)
EST. AVERAGE GLUCOSE BLD GHB EST-MCNC: 154 MG/DL
GLOBULIN SER CALC-MCNC: 3.9 G/DL (ref 2–4)
GLUCOSE SERPL-MCNC: 169 MG/DL (ref 65–100)
HBA1C MFR BLD: 7 % (ref 4–5.6)
HCT VFR BLD AUTO: 49.5 % (ref 36.6–50.3)
HGB BLD-MCNC: 17 G/DL (ref 12.1–17)
MCH RBC QN AUTO: 31.8 PG (ref 26–34)
MCHC RBC AUTO-ENTMCNC: 34.3 G/DL (ref 30–36.5)
MCV RBC AUTO: 92.5 FL (ref 80–99)
NRBC # BLD: 0 K/UL (ref 0–0.01)
NRBC BLD-RTO: 0 PER 100 WBC
PLATELET # BLD AUTO: 197 K/UL (ref 150–400)
PMV BLD AUTO: 11.4 FL (ref 8.9–12.9)
POTASSIUM SERPL-SCNC: 3.8 MMOL/L (ref 3.5–5.1)
PROT SERPL-MCNC: 7.8 G/DL (ref 6.4–8.2)
RBC # BLD AUTO: 5.35 M/UL (ref 4.1–5.7)
SODIUM SERPL-SCNC: 139 MMOL/L (ref 136–145)
WBC # BLD AUTO: 11.6 K/UL (ref 4.1–11.1)

## 2025-01-21 PROCEDURE — 93005 ELECTROCARDIOGRAM TRACING: CPT | Performed by: SURGERY

## 2025-01-21 PROCEDURE — 83036 HEMOGLOBIN GLYCOSYLATED A1C: CPT

## 2025-01-21 PROCEDURE — 80053 COMPREHEN METABOLIC PANEL: CPT

## 2025-01-21 PROCEDURE — 85027 COMPLETE CBC AUTOMATED: CPT

## 2025-01-23 ENCOUNTER — ANESTHESIA EVENT (OUTPATIENT)
Facility: HOSPITAL | Age: 80
End: 2025-01-23
Payer: MEDICARE

## 2025-01-23 NOTE — ANESTHESIA PRE PROCEDURE
Department of Anesthesiology  Preprocedure Note       Name:  Lex Naidu   Age:  79 y.o.  :  1945                                          MRN:  [id number]         Date:  2025      Surgeon: Surgeon(s):  Clayton Alatorre MD    Procedure: Procedure(s):  EXCISION OF RIGHT FOOT BASAL CELL CARCINOMA, APPLICATION OF SKIN SUBSTITUTE    Medications prior to admission:   Prior to Admission medications    Medication Sig Start Date End Date Taking? Authorizing Provider   allopurinol (ZYLOPRIM) 300 MG tablet Take 1 tablet by mouth nightly   Yes Automatic Reconciliation, Ar   aspirin 81 MG EC tablet Take 1 tablet by mouth daily   Yes Automatic Reconciliation, Ar   felodipine (PLENDIL) 10 MG extended release tablet Take 1 tablet by mouth daily   Yes Automatic Reconciliation, Ar   fenofibrate (TRICOR) 145 MG tablet Take 1 tablet by mouth nightly   Yes Automatic Reconciliation, Ar   gabapentin (NEURONTIN) 300 MG capsule Take 1 capsule by mouth 2 times daily.   Yes Automatic Reconciliation, Ar   lovastatin (MEVACOR) 20 MG tablet Take 1 tablet by mouth nightly   Yes Automatic Reconciliation, Ar   meloxicam (MOBIC) 15 MG tablet Take 1 tablet by mouth daily   Yes Automatic Reconciliation, Ar   metFORMIN (GLUCOPHAGE) 500 MG tablet Take 1 tablet by mouth daily   Yes Automatic Reconciliation, Ar   pantoprazole (PROTONIX) 40 MG tablet Take 1 tablet by mouth daily   Yes Automatic Reconciliation, Ar   telmisartan-hydroCHLOROthiazide (MICARDIS HCT) 80-12.5 MG per tablet Take 1 tablet by mouth daily   Yes Automatic Reconciliation, Ar   ketoconazole (NIZORAL) 2 % shampoo every other day 24   Provider, MD Mary       Current medications:    No current facility-administered medications for this encounter.     Current Outpatient Medications   Medication Sig Dispense Refill    allopurinol (ZYLOPRIM) 300 MG tablet Take 1 tablet by mouth nightly      aspirin 81 MG EC tablet Take 1 tablet by mouth daily      felodipine

## 2025-01-24 ENCOUNTER — ANESTHESIA (OUTPATIENT)
Facility: HOSPITAL | Age: 80
End: 2025-01-24
Payer: MEDICARE

## 2025-01-24 ENCOUNTER — HOSPITAL ENCOUNTER (OUTPATIENT)
Facility: HOSPITAL | Age: 80
Discharge: HOME | End: 2025-01-24
Attending: SURGERY | Admitting: SURGERY
Payer: MEDICARE

## 2025-01-24 VITALS
BODY MASS INDEX: 40.6 KG/M2 | OXYGEN SATURATION: 95 % | DIASTOLIC BLOOD PRESSURE: 71 MMHG | RESPIRATION RATE: 18 BRPM | TEMPERATURE: 97.8 F | HEART RATE: 70 BPM | WEIGHT: 290 LBS | HEIGHT: 71 IN | SYSTOLIC BLOOD PRESSURE: 134 MMHG

## 2025-01-24 DIAGNOSIS — C44.712 BASAL CELL CARCINOMA OF RIGHT LOWER EXTREMITY: ICD-10-CM

## 2025-01-24 PROBLEM — C44.91 BASAL CELL CARCINOMA: Status: RESOLVED | Noted: 2024-12-09 | Resolved: 2025-01-24

## 2025-01-24 LAB
CA-I BLD-MCNC: 1.18 MMOL/L (ref 1.12–1.32)
CHLORIDE BLD-SCNC: 102 MMOL/L (ref 98–107)
CREAT UR-MCNC: 1.12 MG/DL (ref 0.6–1.3)
GLUCOSE BLD STRIP.AUTO-MCNC: 161 MG/DL (ref 65–100)
POTASSIUM BLD-SCNC: 3.8 MMOL/L (ref 3.5–5.5)
SODIUM BLD-SCNC: 144 MMOL/L (ref 136–145)

## 2025-01-24 PROCEDURE — 6360000002 HC RX W HCPCS: Performed by: SURGERY

## 2025-01-24 PROCEDURE — 2500000003 HC RX 250 WO HCPCS

## 2025-01-24 PROCEDURE — 2709999900 HC NON-CHARGEABLE SUPPLY: Performed by: SURGERY

## 2025-01-24 PROCEDURE — 7100000001 HC PACU RECOVERY - ADDTL 15 MIN: Performed by: SURGERY

## 2025-01-24 PROCEDURE — 3600000012 HC SURGERY LEVEL 2 ADDTL 15MIN: Performed by: SURGERY

## 2025-01-24 PROCEDURE — 7100000011 HC PHASE II RECOVERY - ADDTL 15 MIN: Performed by: SURGERY

## 2025-01-24 PROCEDURE — 3700000000 HC ANESTHESIA ATTENDED CARE: Performed by: SURGERY

## 2025-01-24 PROCEDURE — 6360000002 HC RX W HCPCS

## 2025-01-24 PROCEDURE — 7100000000 HC PACU RECOVERY - FIRST 15 MIN: Performed by: SURGERY

## 2025-01-24 PROCEDURE — 7100000010 HC PHASE II RECOVERY - FIRST 15 MIN: Performed by: SURGERY

## 2025-01-24 PROCEDURE — 2580000003 HC RX 258

## 2025-01-24 PROCEDURE — 80047 BASIC METABLC PNL IONIZED CA: CPT

## 2025-01-24 PROCEDURE — 2500000003 HC RX 250 WO HCPCS: Performed by: SURGERY

## 2025-01-24 PROCEDURE — 88305 TISSUE EXAM BY PATHOLOGIST: CPT

## 2025-01-24 PROCEDURE — 3700000001 HC ADD 15 MINUTES (ANESTHESIA): Performed by: SURGERY

## 2025-01-24 PROCEDURE — 3600000002 HC SURGERY LEVEL 2 BASE: Performed by: SURGERY

## 2025-01-24 DEVICE — GRAFT STRAVIX PL AMNIOTIC MEMBRANE 3CM X 6CM: Type: IMPLANTABLE DEVICE | Site: FOOT | Status: FUNCTIONAL

## 2025-01-24 RX ORDER — IPRATROPIUM BROMIDE AND ALBUTEROL SULFATE 2.5; .5 MG/3ML; MG/3ML
1 SOLUTION RESPIRATORY (INHALATION)
Status: DISCONTINUED | OUTPATIENT
Start: 2025-01-24 | End: 2025-01-24 | Stop reason: HOSPADM

## 2025-01-24 RX ORDER — SODIUM CHLORIDE, SODIUM LACTATE, POTASSIUM CHLORIDE, CALCIUM CHLORIDE 600; 310; 30; 20 MG/100ML; MG/100ML; MG/100ML; MG/100ML
INJECTION, SOLUTION INTRAVENOUS ONCE
Status: DISCONTINUED | OUTPATIENT
Start: 2025-01-24 | End: 2025-01-24 | Stop reason: HOSPADM

## 2025-01-24 RX ORDER — HYDROCODONE BITARTRATE AND ACETAMINOPHEN 5; 325 MG/1; MG/1
1 TABLET ORAL EVERY 6 HOURS PRN
Qty: 10 TABLET | Refills: 0 | Status: SHIPPED | OUTPATIENT
Start: 2025-01-24 | End: 2025-01-27

## 2025-01-24 RX ORDER — DIPHENHYDRAMINE HYDROCHLORIDE 50 MG/ML
12.5 INJECTION INTRAMUSCULAR; INTRAVENOUS
Status: DISCONTINUED | OUTPATIENT
Start: 2025-01-24 | End: 2025-01-24 | Stop reason: HOSPADM

## 2025-01-24 RX ORDER — EPHEDRINE SULFATE 50 MG/ML
INJECTION INTRAVENOUS
Status: DISCONTINUED | OUTPATIENT
Start: 2025-01-24 | End: 2025-01-24 | Stop reason: SDUPTHER

## 2025-01-24 RX ORDER — LIDOCAINE 4 G/G
1 PATCH TOPICAL AS NEEDED
Status: DISCONTINUED | OUTPATIENT
Start: 2025-01-24 | End: 2025-01-24 | Stop reason: HOSPADM

## 2025-01-24 RX ORDER — NALOXONE HYDROCHLORIDE 0.4 MG/ML
INJECTION, SOLUTION INTRAMUSCULAR; INTRAVENOUS; SUBCUTANEOUS PRN
Status: DISCONTINUED | OUTPATIENT
Start: 2025-01-24 | End: 2025-01-24 | Stop reason: HOSPADM

## 2025-01-24 RX ORDER — PHENYLEPHRINE HCL IN 0.9% NACL 1 MG/10 ML
SYRINGE (ML) INTRAVENOUS
Status: DISCONTINUED | OUTPATIENT
Start: 2025-01-24 | End: 2025-01-24 | Stop reason: SDUPTHER

## 2025-01-24 RX ORDER — LABETALOL HYDROCHLORIDE 5 MG/ML
10 INJECTION, SOLUTION INTRAVENOUS
Status: DISCONTINUED | OUTPATIENT
Start: 2025-01-24 | End: 2025-01-24 | Stop reason: HOSPADM

## 2025-01-24 RX ORDER — ONDANSETRON 2 MG/ML
4 INJECTION INTRAMUSCULAR; INTRAVENOUS
Status: DISCONTINUED | OUTPATIENT
Start: 2025-01-24 | End: 2025-01-24 | Stop reason: HOSPADM

## 2025-01-24 RX ORDER — SODIUM CHLORIDE 0.9 % (FLUSH) 0.9 %
5-40 SYRINGE (ML) INJECTION EVERY 12 HOURS SCHEDULED
Status: DISCONTINUED | OUTPATIENT
Start: 2025-01-24 | End: 2025-01-24 | Stop reason: HOSPADM

## 2025-01-24 RX ORDER — BUPIVACAINE HYDROCHLORIDE AND EPINEPHRINE 5; 5 MG/ML; UG/ML
INJECTION, SOLUTION PERINEURAL PRN
Status: DISCONTINUED | OUTPATIENT
Start: 2025-01-24 | End: 2025-01-24 | Stop reason: ALTCHOICE

## 2025-01-24 RX ORDER — DEXTROSE MONOHYDRATE 100 MG/ML
INJECTION, SOLUTION INTRAVENOUS CONTINUOUS PRN
Status: DISCONTINUED | OUTPATIENT
Start: 2025-01-24 | End: 2025-01-24 | Stop reason: HOSPADM

## 2025-01-24 RX ORDER — FENTANYL CITRATE 0.05 MG/ML
50 INJECTION, SOLUTION INTRAMUSCULAR; INTRAVENOUS EVERY 5 MIN PRN
Status: DISCONTINUED | OUTPATIENT
Start: 2025-01-24 | End: 2025-01-24 | Stop reason: HOSPADM

## 2025-01-24 RX ORDER — DEXMEDETOMIDINE HYDROCHLORIDE 100 UG/ML
INJECTION, SOLUTION INTRAVENOUS
Status: DISCONTINUED | OUTPATIENT
Start: 2025-01-24 | End: 2025-01-24 | Stop reason: SDUPTHER

## 2025-01-24 RX ORDER — GLUCAGON 1 MG/ML
1 KIT INJECTION PRN
Status: DISCONTINUED | OUTPATIENT
Start: 2025-01-24 | End: 2025-01-24 | Stop reason: HOSPADM

## 2025-01-24 RX ORDER — SODIUM CHLORIDE 9 MG/ML
INJECTION, SOLUTION INTRAVENOUS CONTINUOUS
Status: DISCONTINUED | OUTPATIENT
Start: 2025-01-24 | End: 2025-01-24 | Stop reason: HOSPADM

## 2025-01-24 RX ORDER — SODIUM CHLORIDE 9 MG/ML
INJECTION, SOLUTION INTRAVENOUS PRN
Status: DISCONTINUED | OUTPATIENT
Start: 2025-01-24 | End: 2025-01-24 | Stop reason: HOSPADM

## 2025-01-24 RX ORDER — LIDOCAINE HYDROCHLORIDE 20 MG/ML
INJECTION, SOLUTION INTRAVENOUS
Status: DISCONTINUED | OUTPATIENT
Start: 2025-01-24 | End: 2025-01-24 | Stop reason: SDUPTHER

## 2025-01-24 RX ORDER — METOCLOPRAMIDE HYDROCHLORIDE 5 MG/ML
10 INJECTION INTRAMUSCULAR; INTRAVENOUS
Status: DISCONTINUED | OUTPATIENT
Start: 2025-01-24 | End: 2025-01-24 | Stop reason: HOSPADM

## 2025-01-24 RX ORDER — HYDRALAZINE HYDROCHLORIDE 20 MG/ML
10 INJECTION INTRAMUSCULAR; INTRAVENOUS
Status: DISCONTINUED | OUTPATIENT
Start: 2025-01-24 | End: 2025-01-24 | Stop reason: HOSPADM

## 2025-01-24 RX ORDER — PROPOFOL 10 MG/ML
INJECTION, EMULSION INTRAVENOUS
Status: DISCONTINUED | OUTPATIENT
Start: 2025-01-24 | End: 2025-01-24 | Stop reason: SDUPTHER

## 2025-01-24 RX ORDER — OXYCODONE HYDROCHLORIDE 5 MG/1
10 TABLET ORAL PRN
Status: DISCONTINUED | OUTPATIENT
Start: 2025-01-24 | End: 2025-01-24 | Stop reason: HOSPADM

## 2025-01-24 RX ORDER — FENTANYL CITRATE 50 UG/ML
INJECTION, SOLUTION INTRAMUSCULAR; INTRAVENOUS
Status: DISCONTINUED | OUTPATIENT
Start: 2025-01-24 | End: 2025-01-24 | Stop reason: SDUPTHER

## 2025-01-24 RX ORDER — OXYCODONE HYDROCHLORIDE 5 MG/1
5 TABLET ORAL PRN
Status: DISCONTINUED | OUTPATIENT
Start: 2025-01-24 | End: 2025-01-24 | Stop reason: HOSPADM

## 2025-01-24 RX ORDER — SODIUM CHLORIDE 0.9 % (FLUSH) 0.9 %
5-40 SYRINGE (ML) INJECTION PRN
Status: DISCONTINUED | OUTPATIENT
Start: 2025-01-24 | End: 2025-01-24 | Stop reason: HOSPADM

## 2025-01-24 RX ORDER — DEXAMETHASONE SODIUM PHOSPHATE 4 MG/ML
INJECTION, SOLUTION INTRA-ARTICULAR; INTRALESIONAL; INTRAMUSCULAR; INTRAVENOUS; SOFT TISSUE
Status: DISCONTINUED | OUTPATIENT
Start: 2025-01-24 | End: 2025-01-24 | Stop reason: SDUPTHER

## 2025-01-24 RX ORDER — SODIUM CHLORIDE, SODIUM LACTATE, POTASSIUM CHLORIDE, CALCIUM CHLORIDE 600; 310; 30; 20 MG/100ML; MG/100ML; MG/100ML; MG/100ML
INJECTION, SOLUTION INTRAVENOUS
Status: DISCONTINUED | OUTPATIENT
Start: 2025-01-24 | End: 2025-01-24 | Stop reason: SDUPTHER

## 2025-01-24 RX ORDER — HYDROMORPHONE HYDROCHLORIDE 1 MG/ML
0.5 INJECTION, SOLUTION INTRAMUSCULAR; INTRAVENOUS; SUBCUTANEOUS EVERY 5 MIN PRN
Status: DISCONTINUED | OUTPATIENT
Start: 2025-01-24 | End: 2025-01-24 | Stop reason: HOSPADM

## 2025-01-24 RX ORDER — ONDANSETRON 2 MG/ML
INJECTION INTRAMUSCULAR; INTRAVENOUS
Status: DISCONTINUED | OUTPATIENT
Start: 2025-01-24 | End: 2025-01-24 | Stop reason: SDUPTHER

## 2025-01-24 RX ORDER — LORAZEPAM 2 MG/ML
0.5 INJECTION INTRAMUSCULAR
Status: DISCONTINUED | OUTPATIENT
Start: 2025-01-24 | End: 2025-01-24 | Stop reason: HOSPADM

## 2025-01-24 RX ADMIN — CEFAZOLIN 3000 MG: 1 INJECTION, POWDER, FOR SOLUTION INTRAMUSCULAR; INTRAVENOUS at 09:44

## 2025-01-24 RX ADMIN — DEXAMETHASONE SODIUM PHOSPHATE 8 MG: 4 INJECTION, SOLUTION INTRA-ARTICULAR; INTRALESIONAL; INTRAMUSCULAR; INTRAVENOUS; SOFT TISSUE at 10:06

## 2025-01-24 RX ADMIN — EPHEDRINE SULFATE 10 MG: 50 INJECTION INTRAVENOUS at 10:19

## 2025-01-24 RX ADMIN — EPHEDRINE SULFATE 10 MG: 50 INJECTION INTRAVENOUS at 10:25

## 2025-01-24 RX ADMIN — ONDANSETRON 4 MG: 2 INJECTION INTRAMUSCULAR; INTRAVENOUS at 10:06

## 2025-01-24 RX ADMIN — DEXMEDETOMIDINE 20 MCG: 100 INJECTION, SOLUTION INTRAVENOUS at 09:44

## 2025-01-24 RX ADMIN — FENTANYL CITRATE 25 MCG: 50 INJECTION INTRAMUSCULAR; INTRAVENOUS at 10:31

## 2025-01-24 RX ADMIN — Medication 50 MCG: at 10:19

## 2025-01-24 RX ADMIN — LIDOCAINE HYDROCHLORIDE 100 MG: 20 INJECTION, SOLUTION INTRAVENOUS at 09:51

## 2025-01-24 RX ADMIN — EPHEDRINE SULFATE 10 MG: 50 INJECTION INTRAVENOUS at 10:57

## 2025-01-24 RX ADMIN — SODIUM CHLORIDE, POTASSIUM CHLORIDE, SODIUM LACTATE AND CALCIUM CHLORIDE: 600; 310; 30; 20 INJECTION, SOLUTION INTRAVENOUS at 09:45

## 2025-01-24 RX ADMIN — FENTANYL CITRATE 25 MCG: 50 INJECTION INTRAMUSCULAR; INTRAVENOUS at 11:08

## 2025-01-24 RX ADMIN — Medication 100 MCG: at 10:34

## 2025-01-24 RX ADMIN — PROPOFOL 30 MG: 10 INJECTION, EMULSION INTRAVENOUS at 10:31

## 2025-01-24 RX ADMIN — PROPOFOL 50 MG: 10 INJECTION, EMULSION INTRAVENOUS at 09:57

## 2025-01-24 RX ADMIN — PROPOFOL 150 MG: 10 INJECTION, EMULSION INTRAVENOUS at 09:51

## 2025-01-24 RX ADMIN — PROPOFOL 30 MG: 10 INJECTION, EMULSION INTRAVENOUS at 09:59

## 2025-01-24 RX ADMIN — FENTANYL CITRATE 50 MCG: 50 INJECTION INTRAMUSCULAR; INTRAVENOUS at 09:58

## 2025-01-24 RX ADMIN — Medication 100 MCG: at 10:25

## 2025-01-24 RX ADMIN — Medication 100 MCG: at 10:57

## 2025-01-24 RX ADMIN — EPHEDRINE SULFATE 10 MG: 50 INJECTION INTRAVENOUS at 10:34

## 2025-01-24 ASSESSMENT — PAIN - FUNCTIONAL ASSESSMENT
PAIN_FUNCTIONAL_ASSESSMENT: 0-10
PAIN_FUNCTIONAL_ASSESSMENT: NONE - DENIES PAIN
PAIN_FUNCTIONAL_ASSESSMENT: 0-10
PAIN_FUNCTIONAL_ASSESSMENT: 0-10

## 2025-01-24 NOTE — PERIOP NOTE
Patient alert and oriented x4, VS Stable, no complaints of pain at this time. Wife at bedside. Bed in low position, call bell within reach.    Patient states okay to review and give discharge instructions to Emy Naidu, wife.

## 2025-01-24 NOTE — DISCHARGE INSTRUCTIONS
No strenuous activity or lifting weight for 2 weeks.  Leave the dressing on till next visit.  Cover and shower

## 2025-01-24 NOTE — OP NOTE
Operative Note      Patient: Lex Naidu  YOB: 1945  MRN: [id number]    Date of Procedure: 1/24/2025    Preop diagnosis: Right lateral foot basal cell carcinoma 1 cm x 1 cm diameter.    Post-Op Diagnosis: Same       Procedure:    Wide excision of right lateral foot basal cell carcinoma, 2.5 cm x 4 cm in dimension,  through subcutaneous tissue,  with closure of the wound using Stravix PL 3 cm x 6 cm, 18 cm squire.      Surgeon(s):  Clayton Alatorre MD    Assistant:  Ms. Makenzie Marie    Anesthesia: General/local    Anesthesiologist: Dr. Gilbert    CRNA: Mr. Walsh    Indication: Large basal cell carcinoma involving the right lateral foot    Procedure:    Patient was taken to the operative room.  Patient was laid supine.  Patient received prophylactic dose of antibiotic.  After LMA intubation the right foot was prepped and draped usual sterile fashion.  Timeout was completed.  Local anesthesia was infiltrated.  An elliptical incision was placed encompassing the ulcerated basal cell carcinoma site.  Incision was deepened to the subcutaneous tissue.  The depth of incision was all the way through the subcutaneous tissue to the level of the fascia.  The dimension of the wound was 2.5 cm x 4 cm.  After complete excision the specimen is marked with silk stitch, short stitch indicating the superior margin and long stitch indicating of lateral margin.  Complete hemostasis was achieved.  Due to its location in the right lateral foot I was unable to approximate the wound due to the tension.  Hence I decided to close this wound with skin substitute called Stravix PL 3 cm x 6 cm, 18 cm squire.  This was placed in the wound bed and secured all the way around using 3-0 chromic catgut.  Then the skin substitute was secured to the wound bed itself using 3-0 chromic catgut.  An Adaptic was placed followed by 4 x 4 and Kerlix.    Patient tolerated procedure very well.  There were no complication.  Estimated blood

## 2025-01-24 NOTE — ANESTHESIA POSTPROCEDURE EVALUATION
Department of Anesthesiology  Postprocedure Note    Patient: Lex Naidu  MRN: [id number]  YOB: 1945  Date of evaluation: 1/24/2025    Procedure Summary       Date: 01/24/25 Room / Location: [facility] MAIN OR 03 / [facility] MAIN OR    Anesthesia Start: 0944 Anesthesia Stop: 1118    Procedure: EXCISION OF RIGHT FOOT BASAL CELL CARCINOMA, APPLICATION OF SKIN SUBSTITUTE (Right: Foot) Diagnosis:       Basal cell carcinoma of right lower extremity      (Basal cell carcinoma of right lower extremity [C44.712])    Surgeons: Clayton Alatorre MD Responsible Provider: Davie Gilbert MD    Anesthesia Type: General ASA Status: 3            Anesthesia Type: General    Ramos Phase I: Ramos Score: 9    Ramos Phase II:      Anesthesia Post Evaluation    Patient location during evaluation: PACU  Patient participation: complete - patient participated  Level of consciousness: sleepy but conscious  Pain score: 0  Airway patency: patent  Nausea & Vomiting: no nausea and no vomiting  Cardiovascular status: hemodynamically stable  Respiratory status: acceptable  Hydration status: stable  Multimodal analgesia pain management approach    No notable events documented.

## 2025-01-27 ENCOUNTER — HOSPITAL ENCOUNTER (OUTPATIENT)
Facility: HOSPITAL | Age: 80
Discharge: HOME | End: 2025-01-27
Attending: SURGERY
Payer: MEDICARE

## 2025-01-27 VITALS
DIASTOLIC BLOOD PRESSURE: 71 MMHG | HEART RATE: 79 BPM | SYSTOLIC BLOOD PRESSURE: 155 MMHG | RESPIRATION RATE: 18 BRPM | TEMPERATURE: 98.6 F

## 2025-01-27 PROCEDURE — 99213 OFFICE O/P EST LOW 20 MIN: CPT

## 2025-01-27 NOTE — PROGRESS NOTES
patient and/or patient care issues: [] 15-20 min  [] 21-30 min  [] 31-44 min  [] 45 min or more.   This is above the usual time needed to address patient's chief complaint today: [] Yes  [] No  This time includes physician non-face-to-face service time visit on the date of service such as  Preparing to see the patient (eg, review of tests)  Obtaining and/or reviewing separately obtained history  Performing a medically necessary appropriate examination and/or evaluation  Counseling and educating the patient/family/caregiver  Ordering medications, tests, or procedures  Referring and communicating with other health care professionals as needed  Documenting clinical information in the electronic or other health record  Independently interpreting results (not reported separately) and communicating results to the patient/family/caregiver  Care coordination (not reported separately)    Electronically signed by Clayton Alatorre MD on 2/1/2025 at 8:53 PM

## 2025-01-27 NOTE — FLOWSHEET NOTE
01/27/25 1134   Wound 01/27/25 Foot Right #1   Date First Assessed: 01/27/25   Present on Original Admission: No  Wound Approximate Age at First Assessment (Weeks): 1 weeks  Primary Wound Type: Surgical Type  Location: Foot  Wound Location Orientation: Right  Wound Description (Comments): #1   Wound Image    Wound Etiology Surgical   Dressing Status Clean;Dry;Intact   Wound Cleansed Cleansed with saline   Wound Length (cm) 4.5 cm   Wound Width (cm) 3.5 cm   Wound Depth (cm) 1.1 cm   Wound Surface Area (cm^2) 15.75 cm^2   Wound Volume (cm^3) 17.325 cm^3   Wound Assessment Graft   Drainage Amount Moderate (25-50%)   Drainage Description Serosanguinous   Odor None   Noemi-wound Assessment Intact;Other (Comment)  (sutures)   Margins Attached edges

## 2025-01-27 NOTE — DISCHARGE INSTRUCTIONS
Wound Clinic Physician Orders and Discharge Instructions  [facility]  [address], [address]  [place] [address]  Telephone: [phone]     FAX [phone]    NAME:  Lex Naidu  YOB: 1945  MEDICAL RECORD NUMBER:  [id number]  DATE:  1/27/2025      Return Appointment:    [] Dressing Supply Provider: Joon  [x] Home Healthcare: [facility]  for skilled nursing wound care  [x] Return Appointment: 1 Week(s)   [] Nurse Visit:     [] Discharge from [facility]: [] Healed        [] Refer to Provider:         [] Consult    Follow-up Information:    [] Ordered Tests:  [] Vascular/Arterial Testing [] Imaging (X-ray, MRI, or CT)   [] Please call [phone] to schedule any testing    [x] Referrals:    [] Infectious Disease  [] Vascular  [x] Other: Dermatology 4/7/25    [] Rx to pharmacy:   [] Would Culture obtained in clinic:  [] Other:     Wound Cleansing:   Do not scrub or use excessive force.  Cleanse wound prior to applying a clean dressing with:    [x] Normal Saline   [] Mild Soap & Water     [] Keep Wound Dry in Shower  [] Wear a cast cover to shower  [] Other:       Topical Treatments:  Do not apply lotions, creams, or ointments to wound bed unless directed.     [] Apply moisturizing lotion to skin surrounding the wound prior to dressing change.  [] Apply antifungal ointment to skin surrounding the wound prior to dressing change.  [] Apply thin film of moisture barrier ointment (Zinc) to skin immediately around wound.  [] Apply Betadine to skin immediately around wound   [] Apply Skin Prep to skin immediately around wound  [] Other:      Dressings:           Wound Location Right Foot    [x] Apply Primary Dressing:       [] MediHoney Gel [] MediHoney Alginate  [] Calcium Alginate with Silver   [] Calcium Alginate without silver   [] Collagen with silver [] Collagen without Silver    [] Santyl with moist saline gauze     [] Hydrofera Blue (cut to size and

## 2025-01-27 NOTE — WOUND CARE
Wound Clinic Physician Orders and Discharge Instructions  [facility]  [address], [address]  [place] [address]  Telephone: [phone]     FAX [phone]    NAME:  Lex Naidu  YOB: 1945  MEDICAL RECORD NUMBER:  [id number]  DATE:  1/27/2025      Return Appointment:    [] Dressing Supply Provider: Joon  [x] Home Healthcare: [facility]  for skilled nursing wound care  [x] Return Appointment: 1 Week(s)   [] Nurse Visit:     [] Discharge from [facility]: [] Healed        [] Refer to Provider:         [] Consult    Follow-up Information:    [] Ordered Tests:  [] Vascular/Arterial Testing [] Imaging (X-ray, MRI, or CT)   [] Please call [phone] to schedule any testing    [x] Referrals:    [] Infectious Disease  [] Vascular  [x] Other: Dermatology 4/7/25    [] Rx to pharmacy:   [] Would Culture obtained in clinic:  [] Other:     Wound Cleansing:   Do not scrub or use excessive force.  Cleanse wound prior to applying a clean dressing with:    [x] Normal Saline   [] Mild Soap & Water     [] Keep Wound Dry in Shower  [] Wear a cast cover to shower  [] Other:       Topical Treatments:  Do not apply lotions, creams, or ointments to wound bed unless directed.     [] Apply moisturizing lotion to skin surrounding the wound prior to dressing change.  [] Apply antifungal ointment to skin surrounding the wound prior to dressing change.  [] Apply thin film of moisture barrier ointment (Zinc) to skin immediately around wound.  [] Apply Betadine to skin immediately around wound   [] Apply Skin Prep to skin immediately around wound  [] Other:      Dressings:           Wound Location Right Foot    [x] Apply Primary Dressing:       [] MediHoney Gel [] MediHoney Alginate  [] Calcium Alginate with Silver   [] Calcium Alginate without silver   [] Collagen with silver [] Collagen without Silver    [] Santyl with moist saline gauze     [] Hydrofera Blue (cut to size and

## 2025-02-03 ENCOUNTER — HOSPITAL ENCOUNTER (OUTPATIENT)
Facility: HOSPITAL | Age: 80
Discharge: HOME | End: 2025-02-03
Attending: SURGERY
Payer: MEDICARE

## 2025-02-03 VITALS
RESPIRATION RATE: 18 BRPM | SYSTOLIC BLOOD PRESSURE: 166 MMHG | DIASTOLIC BLOOD PRESSURE: 76 MMHG | TEMPERATURE: 97.3 F | HEART RATE: 73 BPM

## 2025-02-03 DIAGNOSIS — L03.119 CELLULITIS OF FOOT: Primary | ICD-10-CM

## 2025-02-03 PROCEDURE — 99213 OFFICE O/P EST LOW 20 MIN: CPT

## 2025-02-03 ASSESSMENT — PAIN SCALES - GENERAL: PAINLEVEL_OUTOF10: 4

## 2025-02-03 NOTE — WOUND CARE
Pathology report reviewed with Dr. Alatorre.  
restrictions:     Physician:  [] Dr. Cristal Magana  [] Dr. Mikki Thompson   [x] Dr. Clayton Alatorre  [] Tripp Manning PA-C  [] Dr. Donavon Torres  [] Dr. Singh Cardoso  [] Dr. Viktoria Cole    Nurse Case Manger:        Wound Care Center Information: Should you experience any significant changes in your wound(s) or have questions about your wound care, please contact the Wound Center at [phone]. Our hours are Monday - Friday 8am - 4:30pm, closed all major holidays. If you need help with your wound outside these hours and cannot wait until we are again available, contact your PCP or go to the hospital emergency room.     PLEASE NOTE: IF YOU ARE UNABLE TO OBTAIN WOUND SUPPLIES, CONTINUE TO USE THE SUPPLIES YOU HAVE AVAILABLE UNTIL YOU ARE ABLE TO REACH US. IT IS MOST IMPORTANT TO KEEP THE WOUND COVERED AT ALL TIMES.

## 2025-02-03 NOTE — PROGRESS NOTES
[facility]   Wound Care and Hyperbaric Oxygen Therapy Center   Medical Staff Note     Lex Naidu  MEDICAL RECORD NUMBER:  [id number]  AGE: 79 y.o.   GENDER: male  : 1945  EPISODE DATE:  2/3/2025      Chief Complaint:   Chief Complaint   Patient presents with    Wound Check     Right foot       History of Present Illness:     Lex Naidu is a 79 y.o. male who presents today for wound/ulcer evaluation.  He underwent a wide excision of his right lateral foot basal cell carcinoma on 2025.  With application of skin substitute using Stravix PL. here for postoperative follow-up.  He has been experiencing some increased pain in his right foot with surrounding redness and edema.  No fever or chills.  No active drainage.    Ulcer Identification:  Ulcer Type:  Malignant ulcer, basal cell carcinoma     HEALTH FACTORS: none    Wound: Right lateral foot basal cell carcinoma    Past Medical History:       Diagnosis Date    Arthritis     Cancer (HCC)     colon,skin/colon - surgery/? unsure if he had chemotherapy (was experimental and he had a port)    Diabetes (HCC)     GERD (gastroesophageal reflux disease)     Gout     Hyperlipidemia     Hypertension     Ill-defined condition     obesity   BMI= 35.9 om 2016       Past Surgical History:   Past Surgical History:   Procedure Laterality Date    BACK SURGERY      BRAIN SURGERY      for tremors    COLONOSCOPY      multiple    COLONOSCOPY N/A 04/10/2024    COLONOSCOPY performed by Gerald Lott MD at [facility] ENDOSCOPY    COLONOSCOPY N/A 04/10/2024    COLONOSCOPY BIOPSY performed by Gerald Lott MD at [facility] ENDOSCOPY    FOOT SURGERY Right 2025    EXCISION OF RIGHT FOOT BASAL CELL CARCINOMA, APPLICATION OF SKIN SUBSTITUTE performed by Clayton Alatorre MD at [facility] MAIN OR    HEENT Bilateral     Lasik    MALIGNANT SKIN LESION EXCISION      unsure of type of cancer    ORTHOPEDIC SURGERY Left     shoulder reconstruction and

## 2025-02-03 NOTE — DISCHARGE INSTRUCTIONS
Wound Clinic Physician Orders and Discharge Instructions  [facility]  [address], [address]  [place] [address]  Telephone: [phone]     FAX [phone]    NAME:  Lex Naidu  YOB: 1945  MEDICAL RECORD NUMBER:  [id number]  DATE:  2/3/2025      Return Appointment:    [] Dressing Supply Provider: Joon  [x] Home Healthcare:  [facility] for wound care   [x] Return Appointment: 1 Week(s)   [] Nurse Visit:     [] Discharge from [facility]: [] Healed        [] Refer to Provider:         [] Consult    Follow-up Information:    [] Ordered Tests:  [] Vascular/Arterial Testing [] Imaging (X-ray, MRI, or CT)   [] Please call [phone] to schedule any testing    [x] Referrals:    [] Infectious Disease  [] Vascular  [x] Other: Dermatology 4/7/25    [] Rx to pharmacy:   [] Would Culture obtained in clinic:   [] Other:     Wound Cleansing:   Do not scrub or use excessive force.  Cleanse wound prior to applying a clean dressing with:    [x] Normal Saline   [] Mild Soap & Water     [] Keep Wound Dry in Shower  [] Wear a cast cover to shower  [] Other:       Topical Treatments:  Do not apply lotions, creams, or ointments to wound bed unless directed.     [] Apply moisturizing lotion to skin surrounding the wound prior to dressing change.  [] Apply antifungal ointment to skin surrounding the wound prior to dressing change.  [] Apply thin film of moisture barrier ointment (Zinc) to skin immediately around wound.  [] Apply Betadine to skin immediately around wound   [] Apply Skin Prep to skin immediately around wound  [] Other:      Dressings:           Wound Location Right Foot    [x] Apply Primary Dressing:       [] MediHoney Gel [] MediHoney Alginate  [] Calcium Alginate with Silver   [] Calcium Alginate without silver   [] Collagen with silver [] Collagen without Silver    [] Santyl with moist saline gauze     [] Hydrofera Blue (cut to size and moistened with normal

## 2025-02-10 ENCOUNTER — HOSPITAL ENCOUNTER (OUTPATIENT)
Facility: HOSPITAL | Age: 80
Discharge: HOME | End: 2025-02-10
Attending: SURGERY
Payer: MEDICARE

## 2025-02-10 VITALS
TEMPERATURE: 97.3 F | DIASTOLIC BLOOD PRESSURE: 81 MMHG | SYSTOLIC BLOOD PRESSURE: 135 MMHG | HEART RATE: 71 BPM | RESPIRATION RATE: 18 BRPM

## 2025-02-10 PROCEDURE — 99213 OFFICE O/P EST LOW 20 MIN: CPT

## 2025-02-10 PROCEDURE — 87205 SMEAR GRAM STAIN: CPT

## 2025-02-10 PROCEDURE — 87070 CULTURE OTHR SPECIMN AEROBIC: CPT

## 2025-02-10 NOTE — WOUND CARE
Wound Clinic Physician Orders and Discharge Instructions  [facility]  [address], [address]  [place] [address]  Telephone: [phone]     FAX [phone]    NAME:  Lex Naidu  YOB: 1945  MEDICAL RECORD NUMBER:  [id number]  DATE:  2/10/2025      Return Appointment:    [] Dressing Supply Provider:   [x] Home Healthcare:  [facility]   [x] Return Appointment: 1 Week(s)   [] Nurse Visit:     [] Discharge from [facility]: [] Healed        [] Refer to Provider:         [] Consult    Follow-up Information:    [] Ordered Tests:  [] Vascular/Arterial Testing [] Imaging (X-ray, MRI, or CT)   [] Please call [phone] to schedule any testing    [x] Referrals:    [] Infectious Disease  [] Vascular  [x] Other: Dermatology 4/7/25    [x] Rx to pharmacy: Refill Augmentin  [x] Would Culture obtained in clinic:   [] Other:     Wound Cleansing:   Do not scrub or use excessive force.  Cleanse wound prior to applying a clean dressing with:    [x] Normal Saline   [] Mild Soap & Water     [] Keep Wound Dry in Shower  [] Wear a cast cover to shower  [] Other:       Topical Treatments:  Do not apply lotions, creams, or ointments to wound bed unless directed.     [] Apply moisturizing lotion to skin surrounding the wound prior to dressing change.  [] Apply antifungal ointment to skin surrounding the wound prior to dressing change.  [] Apply thin film of moisture barrier ointment (Zinc) to skin immediately around wound.  [] Apply Betadine to skin immediately around wound   [] Apply Skin Prep to skin immediately around wound  [] Other:      Dressings:           Wound Location Right Foot    [x] Apply Primary Dressing:       [] MediHoney Gel [] MediHoney Alginate  [] Calcium Alginate with Silver   [] Calcium Alginate without silver   [] Collagen with silver [] Collagen without Silver    [] Santyl with moist saline gauze     [] Hydrofera Blue (cut to size and moistened with normal

## 2025-02-10 NOTE — PROGRESS NOTES
Strip   [] Skin Sub, Steri-Strips, Mepitel (DO NOT REMOVE)  [x] Other: Graft in Place, Adaptic, Hydrofera blue    [x] Cover and Secure with:     [x] Gauze [x] Katty  [] Kerlix   [] Zetuvit Plus Silicone Border or Foam Border [] Super Absorbant [] ABD     [] Ace Wrap [] Other: Foam   Limit contact of tape with skin.    [x] Change dressing: [] Daily    [] Every Other Day   [] Twice per week   [x] Three times per week do not remove graft    [] Once a week [] Do Not Change Dressing   [] Other:     Pressure Relief:  [] When sitting, shift position or do seat lifts every 15 minutes.  [] Wheelchair cushion [] Specialty Bed/Mattress  [x] Avoid direct pressure on wound site.  [] Other     Edema Control:  Apply: [] Compression Stocking:  mmHg  []Right Leg []Left Leg   [x] Tubigrip []Right Leg Double Layer []Left Leg Double Layer      [x]Right Leg Single Layer []Left Leg Single Layer      [x] Elevate leg(s) above the level of the heart when sitting.    [x] Avoid prolonged standing in one place.     Off-Loading:   [x] Off-loading when: [x] walking  [] in bed [x] sitting  [] Total non-weight bearing  [] Right Leg  [] Left Leg   [x] Assistive Device [] Walker [x] Cane  [] Wheelchair  [] Crutches   [] Surgical shoe    [] Wedge Shoe  [] Foam Boot(s)  [] Knee Scooter    [] Cast Boot [] CROW Boot     [] Diabetic Shoes    [] Offloading heel boot  [x] Other: open shoe    Dietary:  [x] Diet as tolerated [] Diabetic Diet [] No Added Salt  [x] Increase Protein [] Other:     Activity:  [x] Activity as tolerated  [] Patient has no activity restrictions      [] Strict Bedrest   [] Remain off Work:       [] May return to full duty work                                    [] Return to work with restrictions:     Physician:  [] Dr. Cristal Magana  [] Dr. Mikki Thompson   [x] Dr. Clayton Alatorre  [] Tripp Manning PA-C  [] Dr. Donavon Torres  [] Dr. Singh Cardoso  [] Dr. Viktoria Cole    Nurse Case Manger:  [facility]

## 2025-02-10 NOTE — DISCHARGE INSTRUCTIONS
Wound Clinic Physician Orders and Discharge Instructions  [facility]  [address], [address]  [place] [address]  Telephone: [phone]     FAX [phone]    NAME:  Lex Naidu  YOB: 1945  MEDICAL RECORD NUMBER:  [id number]  DATE:  2/10/2025      Return Appointment:    [] Dressing Supply Provider:   [x] Home Healthcare:  [facility]   [x] Return Appointment: 1 Week(s)   [] Nurse Visit:     [] Discharge from [facility]: [] Healed        [] Refer to Provider:         [] Consult    Follow-up Information:    [] Ordered Tests:  [] Vascular/Arterial Testing [] Imaging (X-ray, MRI, or CT)   [] Please call [phone] to schedule any testing    [x] Referrals:    [] Infectious Disease  [] Vascular  [x] Other: Dermatology 4/7/25    [] Rx to pharmacy:   [x] Would Culture obtained in clinic:   [] Other:     Wound Cleansing:   Do not scrub or use excessive force.  Cleanse wound prior to applying a clean dressing with:    [x] Normal Saline   [] Mild Soap & Water     [] Keep Wound Dry in Shower  [] Wear a cast cover to shower  [] Other:       Topical Treatments:  Do not apply lotions, creams, or ointments to wound bed unless directed.     [] Apply moisturizing lotion to skin surrounding the wound prior to dressing change.  [] Apply antifungal ointment to skin surrounding the wound prior to dressing change.  [] Apply thin film of moisture barrier ointment (Zinc) to skin immediately around wound.  [] Apply Betadine to skin immediately around wound   [] Apply Skin Prep to skin immediately around wound  [] Other:      Dressings:           Wound Location Right Foot    [x] Apply Primary Dressing:       [] MediHoney Gel [] MediHoney Alginate  [] Calcium Alginate with Silver   [] Calcium Alginate without silver   [] Collagen with silver [] Collagen without Silver    [] Santyl with moist saline gauze     [] Hydrofera Blue (cut to size and moistened with normal saline)  [] Hydrofera

## 2025-02-13 LAB
BACTERIA SPEC CULT: ABNORMAL
GRAM STN SPEC: ABNORMAL
GRAM STN SPEC: ABNORMAL
Lab: ABNORMAL

## 2025-02-17 ENCOUNTER — HOSPITAL ENCOUNTER (OUTPATIENT)
Facility: HOSPITAL | Age: 80
Discharge: HOME | End: 2025-02-17
Attending: SURGERY
Payer: MEDICARE

## 2025-02-17 VITALS
SYSTOLIC BLOOD PRESSURE: 160 MMHG | HEART RATE: 72 BPM | RESPIRATION RATE: 18 BRPM | DIASTOLIC BLOOD PRESSURE: 83 MMHG | TEMPERATURE: 97.6 F

## 2025-02-17 PROCEDURE — 99213 OFFICE O/P EST LOW 20 MIN: CPT

## 2025-02-17 ASSESSMENT — PAIN SCALES - GENERAL: PAINLEVEL_OUTOF10: 4

## 2025-02-17 NOTE — PROGRESS NOTES
[facility]   Wound Care and Hyperbaric Oxygen Therapy Center   Medical Staff Note     Lex Naidu  MEDICAL RECORD NUMBER:  [id number]  AGE: 79 y.o.   GENDER: male  : 1945  EPISODE DATE:  2025      Chief Complaint:   Chief Complaint   Patient presents with    Wound Check     R foot       History of Present Illness:     Lex Naidu is a 79 y.o. male who presents today for wound/ulcer evaluation.  He underwent a wide excision of his right lateral foot basal cell carcinoma on 2025.  With application of skin substitute using Stravix PL. here for postoperative follow-up.  He has been experiencing some increased pain in his right foot with surrounding redness and edema.  According to his wife the intensity of the erythema is more pronounced today than it was before. no fever or chills.  No active drainage.    Wound culture was positive for Staphylococcus coagulase negative.  But the sensitivity has not been completed.    Ulcer Identification:  Ulcer Type:  Malignant ulcer, basal cell carcinoma     HEALTH FACTORS: none    Wound: Right lateral foot basal cell carcinoma    Past Medical History:       Diagnosis Date    Arthritis     Cancer (HCC)     colon,skin/colon - surgery/? unsure if he had chemotherapy (was experimental and he had a port)    Diabetes (HCC)     GERD (gastroesophageal reflux disease)     Gout     Hyperlipidemia     Hypertension     Ill-defined condition     obesity   BMI= 35.9 om 2016       Past Surgical History:   Past Surgical History:   Procedure Laterality Date    BACK SURGERY      BRAIN SURGERY      for tremors    COLONOSCOPY      multiple    COLONOSCOPY N/A 04/10/2024    COLONOSCOPY performed by Gerald Lott MD at [facility] ENDOSCOPY    COLONOSCOPY N/A 04/10/2024    COLONOSCOPY BIOPSY performed by Gerald Lott MD at [facility] ENDOSCOPY    FOOT SURGERY Right 2025    EXCISION OF RIGHT FOOT BASAL CELL CARCINOMA, APPLICATION OF SKIN SUBSTITUTE performed

## 2025-02-17 NOTE — WOUND CARE
Wound Clinic Physician Orders and Discharge Instructions  [facility]  [address], [address]  [place] [address]  Telephone: [phone]     FAX [phone]    NAME:  Lex Naidu  YOB: 1945  MEDICAL RECORD NUMBER:  [id number]  DATE:  2/17/2025      Return Appointment:    [] Dressing Supply Provider:   [x] Home Healthcare:  Rena SINGH   [x] Return Appointment: 1 Week(s)   [] Nurse Visit:     [] Discharge from [facility]: [] Healed        [] Refer to Provider:         [] Consult    Follow-up Information:    [] Ordered Tests:  [] Vascular/Arterial Testing [] Imaging (X-ray, MRI, or CT)   [] Please call [phone] to schedule any testing    [x] Referrals:    [] Infectious Disease  [] Vascular  [x] Other: Dermatology 4/7/25    [] Rx to pharmacy:   [] Would Culture obtained in clinic:   [] Other:     Wound Cleansing:   Do not scrub or use excessive force.  Cleanse wound prior to applying a clean dressing with:    [x] Normal Saline   [] Mild Soap & Water     [] Keep Wound Dry in Shower  [] Wear a cast cover to shower  [] Other:       Topical Treatments:  Do not apply lotions, creams, or ointments to wound bed unless directed.     [] Apply moisturizing lotion to skin surrounding the wound prior to dressing change.  [] Apply antifungal ointment to skin surrounding the wound prior to dressing change.  [] Apply thin film of moisture barrier ointment (Zinc) to skin immediately around wound.  [] Apply Betadine to skin immediately around wound   [] Apply Skin Prep to skin immediately around wound  [] Other:      Dressings:           Wound Location Right Foot (until vac arrives)   [x] Apply Primary Dressing:       [] MediHoney Gel [] MediHoney Alginate  [] Calcium Alginate with Silver   [] Calcium Alginate without silver   [] Collagen with silver [] Collagen without Silver    [] Santyl with moist saline gauze     [] Hydrofera Blue (cut to size and moistened with normal 
Wound culture reviewed with Dr. Alatorre, awaiting susceptibilities from the lab. Wound vac ordered and faxed.  
  Noemi-wound Assessment Intact;Other (Comment);Blanchable erythema 02/17/25 1054   Margins Attached edges 02/17/25 1054   Wound Thickness Description not for Pressure Injury Full thickness 02/17/25 1054   Number of days: 21       Electronically signed by Laura Dougherty RN on 2/17/2025 at 11:34 AM    Providers Information:      PROVIDER'S NAME: Dr. Clayton Alatorre    NPI: [id number]

## 2025-02-20 LAB
BACTERIA SPEC CULT: ABNORMAL
GRAM STN SPEC: ABNORMAL
GRAM STN SPEC: ABNORMAL
Lab: ABNORMAL

## 2025-02-24 ENCOUNTER — HOSPITAL ENCOUNTER (OUTPATIENT)
Facility: HOSPITAL | Age: 80
Discharge: HOME | End: 2025-02-24
Attending: SURGERY
Payer: MEDICARE

## 2025-02-24 VITALS
HEART RATE: 79 BPM | SYSTOLIC BLOOD PRESSURE: 158 MMHG | RESPIRATION RATE: 18 BRPM | TEMPERATURE: 97.9 F | DIASTOLIC BLOOD PRESSURE: 73 MMHG

## 2025-02-24 PROCEDURE — 97605 NEG PRS WND THER DME<=50SQCM: CPT

## 2025-02-24 PROCEDURE — 11043 DBRDMT MUSC&/FSCA 1ST 20/<: CPT

## 2025-02-24 RX ORDER — CIPROFLOXACIN 500 MG/1
500 TABLET, FILM COATED ORAL 2 TIMES DAILY
Qty: 28 TABLET | Refills: 0 | Status: SHIPPED | OUTPATIENT
Start: 2025-02-24 | End: 2025-03-10

## 2025-02-24 NOTE — WOUND CARE
Negative Pressure Wound Therapy    NAME:  Lex Naidu  YOB: 1945  MEDICAL RECORD NUMBER:  [id number]  DATE:  2/24/2025    Applied Negative Pressure to Right foot wound(s)/ulcer(s).  [x] Applied skin barrier prep to brain-wound.   [x] Cut strips of plastic drape to picture frame wound so that brain-wound is     covered with the drape.   [x] If bridging dressing to less prominent site, cover any intact skin that will come in contact with the Negative Pressure Therapy sponge, gauze or channel drain with plastic drape. The sponge should never touch intact skin.   [x] Cut sponge, gauze or channel drain to size which will fit into the wound/ulcer bed without being forced.   [x] Be sure the sponge is large enough to hold the entire round plastic flange which is attached to the tubing. Never allow flange to be larger than the sponge or it will produce suction damaging intact skin.  Total number of individual pieces of foam used within the wound bed: 1    [x] If bridging the dressing away from the primary site, be sure the bridge leads to a piece of sponge large enough to hold the entire flange without allowing any of the flange to overlap onto intact skin.   [x] Covered sponge, gauze or channel drain with plastic drape.   [x] Cut a hole in this plastic drape directly over the sponge the same size as the plastic drain tubing.   [x] Removed plastic liner from flange and apply it directly over the hole you cut.   [x] Removed the plastic cover from the flange.   [x] Attached the tubing to the wound/ulcer Negative Pressure Therapy and turn it on to be sure a vacuum is created and that there are no leaks.   [x] If air leaks occur, use plastic drape to patch them.   [x] Secured Negative Pressure Therapy dressing with ace wrap loosely if located on an extremity. Maintain tubing outside of ace wrap. Tubing must not exert pressure on intact skin.    Applied per  Guidelines      Electronically signed by

## 2025-02-24 NOTE — WOUND CARE
Wound Clinic Physician Orders and Discharge Instructions  [facility]  [address], [address]  [place] [address]  Telephone: [phone]     FAX [phone]    NAME:  Lex Naidu  YOB: 1945  MEDICAL RECORD NUMBER:  [id number]  DATE:  2/24/2025      Return Appointment:    [] Dressing Supply Provider:   [x] Home Healthcare:  Rena SINGH (wound vac applied today)  [x] Return Appointment: 1 Week(s)   [] Nurse Visit:     [] Discharge from [facility]: [] Healed        [] Refer to Provider:         [] Consult    Follow-up Information:    [] Ordered Tests:  [] Vascular/Arterial Testing [] Imaging (X-ray, MRI, or CT)   [] Please call [phone] to schedule any testing    [x] Referrals:    [] Infectious Disease  [] Vascular  [x] Other: Dermatology 4/7/25    [x] Rx to pharmacy: Cipro  [] Would Culture obtained in clinic:   [] Other:     Wound Cleansing:   Do not scrub or use excessive force.  Cleanse wound prior to applying a clean dressing with:    [x] Normal Saline   [] Mild Soap & Water     [] Keep Wound Dry in Shower  [] Wear a cast cover to shower  [] Other:       Topical Treatments:  Do not apply lotions, creams, or ointments to wound bed unless directed.     [] Apply moisturizing lotion to skin surrounding the wound prior to dressing change.  [] Apply antifungal ointment to skin surrounding the wound prior to dressing change.  [] Apply thin film of moisture barrier ointment (Zinc) to skin immediately around wound.  [] Apply Betadine to skin immediately around wound   [] Apply Skin Prep to skin immediately around wound  [] Other:      Dressings:           Wound Location Right Foot   [] Apply Primary Dressing:       [] MediHoney Gel [] MediHoney Alginate  [] Calcium Alginate with Silver   [] Calcium Alginate without silver   [] Collagen with silver [] Collagen without Silver    [] Santyl with moist saline gauze     [] Hydrofera Blue (cut to size and moistened with

## 2025-02-24 NOTE — PROGRESS NOTES
Calcium Alginate with Silver   [] Calcium Alginate without silver   [] Collagen with silver [] Collagen without Silver    [] Santyl with moist saline gauze     [] Hydrofera Blue (cut to size and moistened with normal saline)  [] Hydrofera Blue Ready 2nd (cut to size)      [] Normal Saline wet to dry  [] Betadine wet to dry    [] Hydrogel       [] Bactroban/Mupirocin applied 1st in clinic   [] Iodoform Packing Strip [] Plain Packing Strip   [] Skin Sub, Steri-Strips, Mepitel (DO NOT REMOVE)  [] Other: Graft in Place, Adaptic, Hydrofera blue    [] Cover and Secure with:     [] Gauze [] Katty  [] Kerlix   [] Zetuvit Plus Silicone Border or Foam Border [] Super Absorbant [] ABD     [] Ace Wrap [] Other: Foam   Limit contact of tape with skin.    [] Change dressing: [] Daily    [] Every Other Day   [] Twice per week   [] Three times per week do not remove graft    [] Once a week [] Do Not Change Dressing   [] Other:     Negative Pressure:   Started 2/24/25      Wound Location: R Foot  [x] Pressure @ 125 mm/Hg  [x]Continuous []Intermittent   [x] Black/Green Foam  [] White Foam to any tunnels and/or undermining [x] Bridge for comfort/pressure relief  [x] Change dressing 3 times per week   [x] Skin Prep to brain-wound  [x] Other: NS wet to dry if vac malfunctions    Pressure Relief:  [] When sitting, shift position or do seat lifts every 15 minutes.  [] Wheelchair cushion [] Specialty Bed/Mattress  [x] Avoid direct pressure on wound site.  [] Other     Edema Control:  Apply: [] Compression Stocking:  mmHg  []Right Leg []Left Leg   [x] Tubigrip []Right Leg Double Layer []Left Leg Double Layer      [x]Right Leg Single Layer []Left Leg Single Layer      [x] Elevate leg(s) above the level of the heart when sitting.    [x] Avoid prolonged standing in one place.     Off-Loading:   [x] Off-loading when: [x] walking  [] in bed [x] sitting  [] Total non-weight bearing  [] Right Leg  [] Left Leg   [x] Assistive Device [] Walker [x]

## 2025-02-24 NOTE — DISCHARGE INSTRUCTIONS
Wound Clinic Physician Orders and Discharge Instructions  [facility]  [address], [address]  [place] [address]  Telephone: [phone]     FAX [phone]    NAME:  Lex Naidu  YOB: 1945  MEDICAL RECORD NUMBER:  [id number]  DATE:  2/24/2025      Return Appointment:    [] Dressing Supply Provider:   [x] Home Healthcare:  Rena SINGH (wound vac applied today)  [x] Return Appointment: 1 Week(s)   [] Nurse Visit:     [] Discharge from [facility]: [] Healed        [] Refer to Provider:         [] Consult    Follow-up Information:    [] Ordered Tests:  [] Vascular/Arterial Testing [] Imaging (X-ray, MRI, or CT)   [] Please call [phone] to schedule any testing    [x] Referrals:    [] Infectious Disease  [] Vascular  [x] Other: Dermatology 4/7/25    [x] Rx to pharmacy: Cipro  [] Would Culture obtained in clinic:   [] Other:     Wound Cleansing:   Do not scrub or use excessive force.  Cleanse wound prior to applying a clean dressing with:    [x] Normal Saline   [] Mild Soap & Water     [] Keep Wound Dry in Shower  [] Wear a cast cover to shower  [] Other:       Topical Treatments:  Do not apply lotions, creams, or ointments to wound bed unless directed.     [] Apply moisturizing lotion to skin surrounding the wound prior to dressing change.  [] Apply antifungal ointment to skin surrounding the wound prior to dressing change.  [] Apply thin film of moisture barrier ointment (Zinc) to skin immediately around wound.  [] Apply Betadine to skin immediately around wound   [] Apply Skin Prep to skin immediately around wound  [] Other:      Dressings:           Wound Location Right Foot   [] Apply Primary Dressing:       [] MediHoney Gel [] MediHoney Alginate  [] Calcium Alginate with Silver   [] Calcium Alginate without silver   [] Collagen with silver [] Collagen without Silver    [] Santyl with moist saline gauze     [] Hydrofera Blue (cut to size and moistened with

## 2025-02-24 NOTE — OP NOTE
02/24/25 0934   Odor Mild 02/24/25 0934   Noemi-wound Assessment Intact;Blanchable erythema 02/24/25 0934   Margins Attached edges;Epibole (rolled edges) 02/24/25 0934   Wound Thickness Description not for Pressure Injury Full thickness 02/24/25 0934   Number of days: 28        Total Surface Area Debrided: 16.83 sq cm   Estimated Blood Loss: Minimal amount blood loss .  Hemostasis Achieved:  by pressure  Procedural Pain: 0  / 10   Post Procedural Pain: 0 / 10   Response to treatment: Patient tolerated procedure well with no complaints of pain.

## 2025-03-03 ENCOUNTER — HOSPITAL ENCOUNTER (OUTPATIENT)
Facility: HOSPITAL | Age: 80
Discharge: HOME | End: 2025-03-03
Attending: SURGERY
Payer: MEDICARE

## 2025-03-03 VITALS
TEMPERATURE: 98.1 F | SYSTOLIC BLOOD PRESSURE: 106 MMHG | DIASTOLIC BLOOD PRESSURE: 53 MMHG | RESPIRATION RATE: 18 BRPM | HEART RATE: 76 BPM

## 2025-03-03 PROCEDURE — 97605 NEG PRS WND THER DME<=50SQCM: CPT

## 2025-03-03 NOTE — DISCHARGE INSTRUCTIONS
Added Salt  [x] Increase Protein [] Other:     Activity:  [x] Activity as tolerated  [] Patient has no activity restrictions      [] Strict Bedrest   [] Remain off Work:       [] May return to full duty work                                    [] Return to work with restrictions:     Physician:  [] Dr. Cristal Magana  [] Dr. Mikki Thompson   [x] Dr. Clayton Alatorre  [] Tripp Manning PA-C  [] Dr. Donavon Torres  [] Dr. Singh Cardoso  [] Dr. Viktoria Cole    Nurse Case Manger:        Wound Care Center Information: Should you experience any significant changes in your wound(s) or have questions about your wound care, please contact the Wound Center at [phone]. Our hours are Monday - Friday 8am - 4:30pm, closed all major holidays. If you need help with your wound outside these hours and cannot wait until we are again available, contact your PCP or go to the hospital emergency room.     PLEASE NOTE: IF YOU ARE UNABLE TO OBTAIN WOUND SUPPLIES, CONTINUE TO USE THE SUPPLIES YOU HAVE AVAILABLE UNTIL YOU ARE ABLE TO REACH US. IT IS MOST IMPORTANT TO KEEP THE WOUND COVERED AT ALL TIMES.

## 2025-03-03 NOTE — WOUND CARE
Wound Clinic Physician Orders and Discharge Instructions  [facility]  [address], [address]  [place] [address]  Telephone: [phone]     FAX [phone]    NAME:  Lex Naidu  YOB: 1945  MEDICAL RECORD NUMBER:  [id number]  DATE:  3/3/2025      Return Appointment:    [] Dressing Supply Provider:   [x] Home Healthcare:  Rena SINGH   [x] Return Appointment: 2 Week(s)   [] Nurse Visit:     [] Discharge from [facility]: [] Healed        [] Refer to Provider:         [] Consult    Follow-up Information:    [] Ordered Tests:  [] Vascular/Arterial Testing [] Imaging (X-ray, MRI, or CT)   [] Please call [phone] to schedule any testing    [x] Referrals:    [] Infectious Disease  [] Vascular  [x] Other: Dermatology 3/5/25    [] Rx to pharmacy:   [] Would Culture obtained in clinic:   [] Other:     Wound Cleansing:   Do not scrub or use excessive force.  Cleanse wound prior to applying a clean dressing with:    [x] Normal Saline   [] Mild Soap & Water     [] Keep Wound Dry in Shower  [] Wear a cast cover to shower  [] Other:       Topical Treatments:  Do not apply lotions, creams, or ointments to wound bed unless directed.     [] Apply moisturizing lotion to skin surrounding the wound prior to dressing change.  [] Apply antifungal ointment to skin surrounding the wound prior to dressing change.  [] Apply thin film of moisture barrier ointment (Zinc) to skin immediately around wound.  [] Apply Betadine to skin immediately around wound   [] Apply Skin Prep to skin immediately around wound  [] Other:      Dressings:           Wound Location Right Foot   [] Apply Primary Dressing:       [] MediHoney Gel [] MediHoney Alginate  [] Calcium Alginate with Silver   [] Calcium Alginate without silver   [] Collagen with silver [] Collagen without Silver    [] Santyl with moist saline gauze     [] Hydrofera Blue (cut to size and moistened with normal saline)  [] Hydrofera Blue 
Pathology report given to patient to take to dermatology appointment this week.  
Estelita Paula RN on 3/3/2025 at 9:41 AM

## 2025-03-03 NOTE — PROGRESS NOTES
[] Apply moisturizing lotion to skin surrounding the wound prior to dressing change.  [] Apply antifungal ointment to skin surrounding the wound prior to dressing change.  [] Apply thin film of moisture barrier ointment (Zinc) to skin immediately around wound.  [] Apply Betadine to skin immediately around wound   [] Apply Skin Prep to skin immediately around wound  [] Other:      Dressings:           Wound Location Right Foot   [] Apply Primary Dressing:       [] MediHoney Gel [] MediHoney Alginate  [] Calcium Alginate with Silver   [] Calcium Alginate without silver   [] Collagen with silver [] Collagen without Silver    [] Santyl with moist saline gauze     [] Hydrofera Blue (cut to size and moistened with normal saline)  [] Hydrofera Blue Ready 2nd (cut to size)      [] Normal Saline wet to dry  [] Betadine wet to dry    [] Hydrogel       [] Bactroban/Mupirocin applied 1st in clinic   [] Iodoform Packing Strip [] Plain Packing Strip   [] Skin Sub, Steri-Strips, Mepitel (DO NOT REMOVE)  [] Other: Graft in Place, Adaptic, Hydrofera blue    [] Cover and Secure with:     [] Gauze [] Katty  [] Kerlix   [] Zetuvit Plus Silicone Border or Foam Border [] Super Absorbant [] ABD     [] Ace Wrap [] Other: Foam   Limit contact of tape with skin.    [] Change dressing: [] Daily    [] Every Other Day   [] Twice per week   [] Three times per week do not remove graft    [] Once a week [] Do Not Change Dressing   [] Other:     Negative Pressure:        Wound Location: R Foot  [x] Pressure @ 125 mm/Hg  [x]Continuous []Intermittent   [x] Black/Green Foam  [] White Foam to any tunnels and/or undermining [x] Bridge for comfort/pressure relief  [x] Change dressing 3 times per week   [x] Skin Prep to brain-wound  [x] Other: NS wet to dry if vac malfunctions    Pressure Relief:  [] When sitting, shift position or do seat lifts every 15 minutes.  [] Wheelchair cushion [] Specialty Bed/Mattress  [x] Avoid direct pressure on wound

## 2025-03-17 ENCOUNTER — HOSPITAL ENCOUNTER (OUTPATIENT)
Facility: HOSPITAL | Age: 80
Discharge: HOME | End: 2025-03-17
Attending: SURGERY
Payer: MEDICARE

## 2025-03-17 VITALS
HEART RATE: 78 BPM | SYSTOLIC BLOOD PRESSURE: 128 MMHG | DIASTOLIC BLOOD PRESSURE: 69 MMHG | RESPIRATION RATE: 18 BRPM | TEMPERATURE: 98.2 F

## 2025-03-17 PROCEDURE — 97605 NEG PRS WND THER DME<=50SQCM: CPT

## 2025-03-17 PROCEDURE — 11042 DBRDMT SUBQ TIS 1ST 20SQCM/<: CPT

## 2025-03-17 NOTE — WOUND CARE
Negative Pressure Wound Therapy    NAME:  Lex Naidu  YOB: 1945  MEDICAL RECORD NUMBER:  [id number]  DATE:  3/17/2025    Applied Negative Pressure to right foot wound(s)/ulcer(s).  [x] Applied skin barrier prep to brain-wound.   [x] Cut strips of plastic drape to picture frame wound so that brain-wound is     covered with the drape.   [x] If bridging dressing to less prominent site, cover any intact skin that will come in contact with the Negative Pressure Therapy sponge, gauze or channel drain with plastic drape. The sponge should never touch intact skin.   [x] Cut sponge, gauze or channel drain to size which will fit into the wound/ulcer bed without being forced.   [x] Be sure the sponge is large enough to hold the entire round plastic flange which is attached to the tubing. Never allow flange to be larger than the sponge or it will produce suction damaging intact skin.  Total number of individual pieces of foam used within the wound bed: 1    [x] If bridging the dressing away from the primary site, be sure the bridge leads to a piece of sponge large enough to hold the entire flange without allowing any of the flange to overlap onto intact skin.   [x] Covered sponge, gauze or channel drain with plastic drape.   [x] Cut a hole in this plastic drape directly over the sponge the same size as the plastic drain tubing.   [x] Removed plastic liner from flange and apply it directly over the hole you cut.   [x] Removed the plastic cover from the flange.   [x] Attached the tubing to the wound/ulcer Negative Pressure Therapy and turn it on to be sure a vacuum is created and that there are no leaks.   [x] If air leaks occur, use plastic drape to patch them.   [] Secured Negative Pressure Therapy dressing with ace wrap loosely if located on an extremity. Maintain tubing outside of ace wrap. Tubing must not exert pressure on intact skin.    Applied per  Guidelines      Electronically signed by Alexandra 
Salt  [x] Increase Protein [] Other:     Activity:  [x] Activity as tolerated  [] Patient has no activity restrictions      [] Strict Bedrest   [] Remain off Work:       [] May return to full duty work                                    [] Return to work with restrictions:     Physician:  [] Dr. Cristal Magana  [] Dr. Mikki Thompson   [x] Dr. Clayton Alatorre  [] Tripp Manning PA-C  [] Dr. Donavon Torres  [] Dr. Singh Cardoso  [] Dr. Viktoria Cole    Nurse Case Manger:        Wound Care Center Information: Should you experience any significant changes in your wound(s) or have questions about your wound care, please contact the Wound Center at [phone]. Our hours are Monday - Friday 8am - 4:30pm, closed all major holidays. If you need help with your wound outside these hours and cannot wait until we are again available, contact your PCP or go to the hospital emergency room.     PLEASE NOTE: IF YOU ARE UNABLE TO OBTAIN WOUND SUPPLIES, CONTINUE TO USE THE SUPPLIES YOU HAVE AVAILABLE UNTIL YOU ARE ABLE TO REACH US. IT IS MOST IMPORTANT TO KEEP THE WOUND COVERED AT ALL TIMES.

## 2025-03-17 NOTE — PROGRESS NOTES
Width (cm) 3 cm 03/17/25 0939   Post-Procedure Depth (cm) 0.5 cm 03/17/25 0939   Post-Procedure Surface Area (cm^2) 14.7 cm^2 03/17/25 0939   Post-Procedure Volume (cm^3) 7.35 cm^3 03/17/25 0939   Wound Assessment Slough;Granulation tissue;Exposed structure muscle 03/17/25 0922   Drainage Amount Large (50-75% saturated) 03/17/25 0922   Drainage Description Serosanguinous 03/17/25 0922   Odor None 03/17/25 0922   Noemi-wound Assessment Intact 03/17/25 0922   Margins Attached edges;Epibole (rolled edges) 03/17/25 0922   Wound Thickness Description not for Pressure Injury Full thickness 03/17/25 0922   Number of days: 49        Total Surface Area Debrided: 14.7 sq cm   Estimated Blood Loss: Minimal amount blood loss .  Hemostasis Achieved:  by pressure  Procedural Pain: 0  / 10   Post Procedural Pain: 0 / 10   Response to treatment: Patient tolerated procedure well with no complaints of pain.  
patient (eg, review of tests)  Obtaining and/or reviewing separately obtained history  Performing a medically necessary appropriate examination and/or evaluation  Counseling and educating the patient/family/caregiver  Ordering medications, tests, or procedures  Referring and communicating with other health care professionals as needed  Documenting clinical information in the electronic or other health record  Independently interpreting results (not reported separately) and communicating results to the patient/family/caregiver  Care coordination (not reported separately)    Electronically signed by Clayton Alatorre MD on 3/17/2025 at 9:57 AM

## 2025-03-17 NOTE — DISCHARGE INSTRUCTIONS
Wound Clinic Physician Orders and Discharge Instructions  [facility]  [address], [address]  [place] [address]  Telephone: [phone]     FAX [phone]    NAME:  Lex Naidu  YOB: 1945  MEDICAL RECORD NUMBER:  [id number]  DATE:  3/17/2025      Return Appointment:    [] Dressing Supply Provider:   [x] Home Healthcare:  Rena SINGH   [x] Return Appointment: 2 Week(s)   [] Nurse Visit:     [] Discharge from [facility]: [] Healed        [] Refer to Provider:         [] Consult    Follow-up Information:    [] Ordered Tests:  [] Vascular/Arterial Testing [] Imaging (X-ray, MRI, or CT)   [] Please call [phone] to schedule any testing    [x] Referrals:    [] Infectious Disease  [] Vascular  [x] Other: Dermatology     [] Rx to pharmacy:   [] Would Culture obtained in clinic:   [] Other:     Wound Cleansing:   Do not scrub or use excessive force.  Cleanse wound prior to applying a clean dressing with:    [x] Normal Saline   [] Mild Soap & Water     [] Keep Wound Dry in Shower  [] Wear a cast cover to shower  [] Other:       Topical Treatments:  Do not apply lotions, creams, or ointments to wound bed unless directed.     [] Apply moisturizing lotion to skin surrounding the wound prior to dressing change.  [] Apply antifungal ointment to skin surrounding the wound prior to dressing change.  [] Apply thin film of moisture barrier ointment (Zinc) to skin immediately around wound.  [] Apply Betadine to skin immediately around wound   [] Apply Skin Prep to skin immediately around wound  [] Other:      Dressings:           Wound Location Right Foot   [] Apply Primary Dressing:       [] MediHoney Gel [] MediHoney Alginate  [] Calcium Alginate with Silver   [] Calcium Alginate without silver   [] Collagen with silver [] Collagen without Silver    [] Santyl with moist saline gauze     [] Hydrofera Blue (cut to size and moistened with normal saline)  [] Hydrofera Blue Ready     07/22/19 0900   Group 1   Start Time 0830   Stop Time 0900   Length (min) 30 Min   Group Name Check In    Focus of Group Symptoms/goals   Attendance Present   Mood Bright   Affect Calm;Positive   Behavior/Socialization Cooperative;Engaged   Thought Process Focused;Hallucinations;Paranoid  (Paranoia 8/10)   Hallucinations Auditory;Visual  (AH 8/10, VH 5/10)   Patient Response Attentive;Interactive   Task Performance Follows directions   Safety Concerns Suicidal ideation;Urge to harm self;Other  (SI 8/10, SH 8/10, Sleep 9/10)   Degree Patient Ready for Discharge No   Group Notes Pt. reported improved worry and restlessness, concerned about sleep and Sh. Set goal to \"get a lot of sleep, try not to be as paranoid, and talk with MD.\"   Group 2   Start Time 1030   Stop Time 1100   Length (min) 30 min   Group Name Education    Focus of Group Sleep hygiene   Attendance Present   Mood Negative   Affect Calm   Behavior/Socialization Cooperative   Thought Process Focused;Tracking   Patient Response Attentive;Interactive   Task Performance Follows directions   Group Notes Pt. engaged in group discussion on sleep hygiene.   Aida Malik MS

## 2025-03-31 ENCOUNTER — HOSPITAL ENCOUNTER (OUTPATIENT)
Facility: HOSPITAL | Age: 80
Discharge: HOME | End: 2025-03-31
Attending: SURGERY
Payer: MEDICARE

## 2025-03-31 VITALS
TEMPERATURE: 98.1 F | SYSTOLIC BLOOD PRESSURE: 181 MMHG | RESPIRATION RATE: 18 BRPM | DIASTOLIC BLOOD PRESSURE: 81 MMHG | HEART RATE: 94 BPM

## 2025-03-31 DIAGNOSIS — E10.621 DIABETIC ULCER OF RIGHT FOOT ASSOCIATED WITH TYPE 1 DIABETES MELLITUS, WITH FAT LAYER EXPOSED, UNSPECIFIED PART OF FOOT: Primary | ICD-10-CM

## 2025-03-31 DIAGNOSIS — L97.512 DIABETIC ULCER OF RIGHT FOOT ASSOCIATED WITH TYPE 1 DIABETES MELLITUS, WITH FAT LAYER EXPOSED, UNSPECIFIED PART OF FOOT: Primary | ICD-10-CM

## 2025-03-31 PROCEDURE — 87186 SC STD MICRODIL/AGAR DIL: CPT

## 2025-03-31 PROCEDURE — 11042 DBRDMT SUBQ TIS 1ST 20SQCM/<: CPT

## 2025-03-31 PROCEDURE — 87205 SMEAR GRAM STAIN: CPT

## 2025-03-31 PROCEDURE — 97605 NEG PRS WND THER DME<=50SQCM: CPT

## 2025-03-31 PROCEDURE — 87077 CULTURE AEROBIC IDENTIFY: CPT

## 2025-03-31 PROCEDURE — 87070 CULTURE OTHR SPECIMN AEROBIC: CPT

## 2025-03-31 RX ORDER — CLOBETASOL PROPIONATE 0.5 MG/G
OINTMENT TOPICAL PRN
OUTPATIENT
Start: 2025-03-31

## 2025-03-31 RX ORDER — SODIUM CHLOR/HYPOCHLOROUS ACID 0.033 %
SOLUTION, IRRIGATION IRRIGATION PRN
OUTPATIENT
Start: 2025-03-31

## 2025-03-31 RX ORDER — NEOMYCIN/BACITRACIN/POLYMYXINB 3.5-400-5K
OINTMENT (GRAM) TOPICAL PRN
Status: CANCELLED | OUTPATIENT
Start: 2025-03-31

## 2025-03-31 RX ORDER — CLOBETASOL PROPIONATE 0.5 MG/G
OINTMENT TOPICAL PRN
Status: CANCELLED | OUTPATIENT
Start: 2025-03-31

## 2025-03-31 RX ORDER — LIDOCAINE 40 MG/G
CREAM TOPICAL PRN
Status: CANCELLED | OUTPATIENT
Start: 2025-03-31

## 2025-03-31 RX ORDER — LIDOCAINE HYDROCHLORIDE 20 MG/ML
JELLY TOPICAL PRN
Status: CANCELLED | OUTPATIENT
Start: 2025-03-31

## 2025-03-31 RX ORDER — NEOMYCIN/BACITRACIN/POLYMYXINB 3.5-400-5K
OINTMENT (GRAM) TOPICAL PRN
OUTPATIENT
Start: 2025-03-31

## 2025-03-31 RX ORDER — LIDOCAINE 50 MG/G
OINTMENT TOPICAL PRN
OUTPATIENT
Start: 2025-03-31

## 2025-03-31 RX ORDER — LIDOCAINE 40 MG/G
CREAM TOPICAL PRN
OUTPATIENT
Start: 2025-03-31

## 2025-03-31 RX ORDER — SODIUM CHLOR/HYPOCHLOROUS ACID 0.033 %
SOLUTION, IRRIGATION IRRIGATION PRN
Status: CANCELLED | OUTPATIENT
Start: 2025-03-31

## 2025-03-31 RX ORDER — SILVER SULFADIAZINE 10 MG/G
CREAM TOPICAL PRN
Status: CANCELLED | OUTPATIENT
Start: 2025-03-31

## 2025-03-31 RX ORDER — GENTAMICIN SULFATE 1 MG/G
OINTMENT TOPICAL PRN
OUTPATIENT
Start: 2025-03-31

## 2025-03-31 RX ORDER — LIDOCAINE HYDROCHLORIDE 40 MG/ML
SOLUTION TOPICAL PRN
OUTPATIENT
Start: 2025-03-31

## 2025-03-31 RX ORDER — BETAMETHASONE DIPROPIONATE 0.5 MG/G
CREAM TOPICAL PRN
OUTPATIENT
Start: 2025-03-31

## 2025-03-31 RX ORDER — BETAMETHASONE DIPROPIONATE 0.5 MG/G
CREAM TOPICAL PRN
Status: CANCELLED | OUTPATIENT
Start: 2025-03-31

## 2025-03-31 RX ORDER — BACITRACIN ZINC AND POLYMYXIN B SULFATE 500; 1000 [USP'U]/G; [USP'U]/G
OINTMENT TOPICAL PRN
Status: CANCELLED | OUTPATIENT
Start: 2025-03-31

## 2025-03-31 RX ORDER — MUPIROCIN 20 MG/G
OINTMENT TOPICAL PRN
OUTPATIENT
Start: 2025-03-31

## 2025-03-31 RX ORDER — LIDOCAINE HYDROCHLORIDE 20 MG/ML
JELLY TOPICAL PRN
OUTPATIENT
Start: 2025-03-31

## 2025-03-31 RX ORDER — TRIAMCINOLONE ACETONIDE 1 MG/G
OINTMENT TOPICAL PRN
Status: CANCELLED | OUTPATIENT
Start: 2025-03-31

## 2025-03-31 RX ORDER — LIDOCAINE HYDROCHLORIDE 40 MG/ML
SOLUTION TOPICAL PRN
Status: CANCELLED | OUTPATIENT
Start: 2025-03-31

## 2025-03-31 RX ORDER — TRIAMCINOLONE ACETONIDE 1 MG/G
OINTMENT TOPICAL PRN
OUTPATIENT
Start: 2025-03-31

## 2025-03-31 RX ORDER — LIDOCAINE HYDROCHLORIDE 40 MG/ML
SOLUTION TOPICAL ONCE
Status: COMPLETED | OUTPATIENT
Start: 2025-03-31 | End: 2025-03-31

## 2025-03-31 RX ORDER — GINSENG 100 MG
CAPSULE ORAL PRN
OUTPATIENT
Start: 2025-03-31

## 2025-03-31 RX ORDER — GINSENG 100 MG
CAPSULE ORAL PRN
Status: CANCELLED | OUTPATIENT
Start: 2025-03-31

## 2025-03-31 RX ORDER — MUPIROCIN 20 MG/G
OINTMENT TOPICAL PRN
Status: CANCELLED | OUTPATIENT
Start: 2025-03-31

## 2025-03-31 RX ORDER — BACITRACIN ZINC AND POLYMYXIN B SULFATE 500; 1000 [USP'U]/G; [USP'U]/G
OINTMENT TOPICAL PRN
OUTPATIENT
Start: 2025-03-31

## 2025-03-31 RX ORDER — LIDOCAINE 50 MG/G
OINTMENT TOPICAL PRN
Status: CANCELLED | OUTPATIENT
Start: 2025-03-31

## 2025-03-31 RX ORDER — SILVER SULFADIAZINE 10 MG/G
CREAM TOPICAL PRN
OUTPATIENT
Start: 2025-03-31

## 2025-03-31 RX ORDER — GENTAMICIN SULFATE 1 MG/G
OINTMENT TOPICAL PRN
Status: CANCELLED | OUTPATIENT
Start: 2025-03-31

## 2025-03-31 RX ADMIN — LIDOCAINE HYDROCHLORIDE: 40 SOLUTION TOPICAL at 08:58

## 2025-03-31 ASSESSMENT — PAIN SCALES - GENERAL: PAINLEVEL_OUTOF10: 2

## 2025-03-31 NOTE — DISCHARGE INSTRUCTIONS
Wound Clinic Physician Orders and Discharge Instructions  [facility] [place] [facility]  [address], [address]  [place] [address]  Telephone: [phone]     FAX [phone]    NAME:  Lex Naidu  YOB: 1945  MEDICAL RECORD NUMBER:  [id number]  DATE:  3/31/2025      Return Appointment:    [] Dressing Supply Provider:   [x] Home Healthcare:  Rena SINGH   [x] Return Appointment: 1 Week(s)   [] Nurse Visit:     [] Discharge from [facility]: [] Healed        [] Refer to Provider:         [] Consult    Follow-up Information:    [] Ordered Tests:  [] Vascular/Arterial Testing [] Imaging (X-ray, MRI, or CT)   [] Please call [phone] to schedule any testing    [x] Referrals:    [] Infectious Disease  [] Vascular  [x] Other: Dermatology     [] Rx to pharmacy:   [x] Would Culture obtained in clinic:   [] Other:     Wound Cleansing:   Do not scrub or use excessive force.  Cleanse wound prior to applying a clean dressing with:    [x] Normal Saline   [] Mild Soap & Water     [] Keep Wound Dry in Shower  [] Wear a cast cover to shower  [] Other:       Topical Treatments:  Do not apply lotions, creams, or ointments to wound bed unless directed.     [] Apply moisturizing lotion to skin surrounding the wound prior to dressing change.  [] Apply antifungal ointment to skin surrounding the wound prior to dressing change.  [] Apply thin film of moisture barrier ointment (Zinc) to skin immediately around wound.  [] Apply Betadine to skin immediately around wound   [] Apply Skin Prep to skin immediately around wound  [] Other:      Dressings:           Wound Location Right Foot   [] Apply Primary Dressing:       [] MediHoney Gel [] MediHoney Alginate  [] Calcium Alginate with Silver   [] Calcium Alginate without silver   [] Collagen with silver [] Collagen without Silver    [] Santyl with moist saline gauze     [] Hydrofera Blue (cut to size and moistened with normal saline)  [] Hydrofera Blue Ready 2nd

## 2025-03-31 NOTE — PROGRESS NOTES
[facility]   Wound Care and Hyperbaric Oxygen Therapy Center   Medical Staff Note     Lex Naidu  MEDICAL RECORD NUMBER:  [id number]  AGE: 79 y.o.   GENDER: male  : 1945  EPISODE DATE:  3/31/2025      Chief Complaint:   Chief Complaint   Patient presents with    Wound Check     R foot       History of Present Illness:     Lex Naidu is a 79 y.o. male who presents today for wound/ulcer evaluation.  He underwent a wide excision of his right lateral foot basal cell carcinoma on 2025.  With application of skin substitute using Stravix PL. here for postoperative follow-up.  He has been experiencing some increased pain in his right foot with surrounding redness and edema.  According to his wife the intensity of the erythema is more pronounced today than it was before. no fever or chills.  No active drainage.    He has been using wound VAC.  No new issues.    Ulcer Identification:  Ulcer Type:  Malignant ulcer, basal cell carcinoma     HEALTH FACTORS: none    Wound: Right lateral foot basal cell carcinoma    Past Medical History:       Diagnosis Date    Arthritis     Cancer (HCC)     colon,skin/colon - surgery/? unsure if he had chemotherapy (was experimental and he had a port)    Diabetes (HCC)     GERD (gastroesophageal reflux disease)     Gout     Hyperlipidemia     Hypertension     Ill-defined condition     obesity   BMI= 35.9 om 2016       Past Surgical History:   Past Surgical History:   Procedure Laterality Date    BACK SURGERY      BRAIN SURGERY      for tremors    COLONOSCOPY      multiple    COLONOSCOPY N/A 04/10/2024    COLONOSCOPY performed by Gerald Lott MD at [facility] ENDOSCOPY    COLONOSCOPY N/A 04/10/2024    COLONOSCOPY BIOPSY performed by Gerald Lott MD at [facility] ENDOSCOPY    FOOT SURGERY Right 2025    EXCISION OF RIGHT FOOT BASAL CELL CARCINOMA, APPLICATION OF SKIN SUBSTITUTE performed by Clayton Alatorre MD at [facility] MAIN OR    HEENT Bilateral

## 2025-03-31 NOTE — WOUND CARE
Wound Clinic Physician Orders and Discharge Instructions  [facility]  [address], [address]  [place] [address]  Telephone: [phone]     FAX [phone]    NAME:  Lex Naidu  YOB: 1945  MEDICAL RECORD NUMBER:  [id number]  DATE:  3/31/2025      Return Appointment:    [] Dressing Supply Provider:   [x] Home Healthcare:  Rena SINGH   [x] Return Appointment: 1 Week(s)   [] Nurse Visit:     [] Discharge from [facility]: [] Healed        [] Refer to Provider:         [] Consult    Follow-up Information:    [] Ordered Tests:  [] Vascular/Arterial Testing [] Imaging (X-ray, MRI, or CT)   [] Please call [phone] to schedule any testing    [x] Referrals:    [] Infectious Disease  [] Vascular  [x] Other: Dermatology     [] Rx to pharmacy:   [x] Would Culture obtained in clinic:   [] Other:     Wound Cleansing:   Do not scrub or use excessive force.  Cleanse wound prior to applying a clean dressing with:    [x] Normal Saline   [] Mild Soap & Water     [] Keep Wound Dry in Shower  [] Wear a cast cover to shower  [] Other:       Topical Treatments:  Do not apply lotions, creams, or ointments to wound bed unless directed.     [] Apply moisturizing lotion to skin surrounding the wound prior to dressing change.  [] Apply antifungal ointment to skin surrounding the wound prior to dressing change.  [] Apply thin film of moisture barrier ointment (Zinc) to skin immediately around wound.  [] Apply Betadine to skin immediately around wound   [] Apply Skin Prep to skin immediately around wound  [] Other:      Dressings:           Wound Location Right Foot   [] Apply Primary Dressing:       [] MediHoney Gel [] MediHoney Alginate  [] Calcium Alginate with Silver   [] Calcium Alginate without silver   [] Collagen with silver [] Collagen without Silver    [] Santyl with moist saline gauze     [] Hydrofera Blue (cut to size and moistened with normal saline)  [] Hydrofera Blue

## 2025-03-31 NOTE — OP NOTE
Procedure Note  Indications: Based on my examination of this patient's wound(s)/ulcer(s) today, debridement is required to promote healing and evaluate the wound base.    Debridement: Excisional Debridement    Using: curette the wound(s)/ulcer(s) was/were debrided down through and including the removal of epidermis, dermis, and subcutaneous tissue.  Performed by: Clayton Alatorre MD  Consent obtained: Yes  Time out taken: Yes  Pain Control:         Devitalized Tissue Debrided: fibrin, biofilm, and slough    Pre Debridement Measurements:  Are located in the Wound/Ulcer Documentation Flow Sheet    Diabetic/Pressure/Non Pressure Ulcers only:  Ulcer: Other: Right foot postsurgical open wound      Wound/Ulcer #: 1  Post Debridement Measurements:  Wound/Ulcer Descriptions are Pre Debridement except measurements:  Negative Pressure Wound Therapy Foot Right (Active)   Canister changed? Yes 03/17/25 1010   Unit Type kci 03/17/25 1010   Mode Continuous 03/17/25 1010   Target Pressure (mmHg) 125 03/17/25 1010   Intensity High 03/03/25 0941   $$ Dressing Changed & Charged $ Standard NPWT less than or equal to 50 sq cm PER TX 03/17/25 1010   Number of pieces placed 1 03/17/25 1010   Dressing Type Black foam 03/17/25 1010   Number of days: 34       Wound 01/27/25 Foot Right #1 (Active)   Wound Image   03/31/25 0902   Wound Etiology Surgical 03/31/25 0902   Dressing Status Clean;Dry;Intact 03/31/25 0902   Wound Cleansed Cleansed with saline 03/31/25 0902   Dressing/Treatment Negative pressure wound therapy;Tubular bandage 03/03/25 0941   Wound Length (cm) 4.3 cm 03/31/25 0902   Wound Width (cm) 2.7 cm 03/31/25 0902   Wound Depth (cm) 0.6 cm 03/31/25 0902   Wound Surface Area (cm^2) 11.61 cm^2 03/31/25 0902   Change in Wound Size % (l*w) 26.29 03/31/25 0902   Wound Volume (cm^3) 6.966 cm^3 03/31/25 0902   Wound Healing % 60 03/31/25 0902   Post-Procedure Length (cm) 4.4 cm 03/31/25 8503   Post-Procedure Width (cm) 2.8 cm

## 2025-03-31 NOTE — OP NOTE
Procedure Note  Indications: Based on my examination of this patient's wound(s)/ulcer(s) today, debridement is required to promote healing and evaluate the wound base.    Debridement: Excisional Debridement    Using: curette the wound(s)/ulcer(s) was/were debrided down through and including the removal of epidermis, dermis, and subcutaneous tissue.  Performed by: Clayton Alatorre MD  Consent obtained: Yes  Time out taken: Yes  Pain Control:         Devitalized Tissue Debrided: fibrin, biofilm, and slough    Pre Debridement Measurements:  Are located in the Wound/Ulcer Documentation Flow Sheet    Diabetic/Pressure/Non Pressure Ulcers only:  Ulcer: Other: Right lateral foot open wound following wide excision of basal cell carcinoma      Wound/Ulcer #: 1  Post Debridement Measurements:  Wound/Ulcer Descriptions are Pre Debridement except measurements:  Negative Pressure Wound Therapy Foot Right (Active)   Canister changed? Yes 03/17/25 1010   Unit Type kci 03/17/25 1010   Mode Continuous 03/17/25 1010   Target Pressure (mmHg) 125 03/17/25 1010   Intensity High 03/03/25 0941   $$ Dressing Changed & Charged $ Standard NPWT less than or equal to 50 sq cm PER TX 03/17/25 1010   Number of pieces placed 1 03/17/25 1010   Dressing Type Black foam 03/17/25 1010   Number of days: 34       Wound 01/27/25 Foot Right #1 (Active)   Wound Image   03/31/25 0902   Wound Etiology Surgical 03/31/25 0902   Dressing Status Clean;Dry;Intact 03/31/25 0902   Wound Cleansed Cleansed with saline 03/31/25 0902   Dressing/Treatment Negative pressure wound therapy;Tubular bandage 03/03/25 0941   Wound Length (cm) 4.3 cm 03/31/25 0902   Wound Width (cm) 2.7 cm 03/31/25 0902   Wound Depth (cm) 0.6 cm 03/31/25 0902   Wound Surface Area (cm^2) 11.61 cm^2 03/31/25 0902   Change in Wound Size % (l*w) 26.29 03/31/25 0902   Wound Volume (cm^3) 6.966 cm^3 03/31/25 0902   Wound Healing % 60 03/31/25 0902   Post-Procedure Length (cm) 4.4 cm 03/31/25

## 2025-04-03 LAB
BACTERIA SPEC CULT: ABNORMAL
BACTERIA SPEC CULT: ABNORMAL
GRAM STN SPEC: ABNORMAL
GRAM STN SPEC: ABNORMAL
Lab: ABNORMAL

## 2025-04-07 ENCOUNTER — HOSPITAL ENCOUNTER (OUTPATIENT)
Facility: HOSPITAL | Age: 80
Discharge: HOME | End: 2025-04-07
Attending: SURGERY
Payer: MEDICARE

## 2025-04-07 VITALS
TEMPERATURE: 97.9 F | SYSTOLIC BLOOD PRESSURE: 150 MMHG | DIASTOLIC BLOOD PRESSURE: 77 MMHG | HEART RATE: 70 BPM | RESPIRATION RATE: 18 BRPM

## 2025-04-07 DIAGNOSIS — E10.621 DIABETIC ULCER OF RIGHT FOOT ASSOCIATED WITH TYPE 1 DIABETES MELLITUS, WITH FAT LAYER EXPOSED, UNSPECIFIED PART OF FOOT: Primary | ICD-10-CM

## 2025-04-07 DIAGNOSIS — L97.512 DIABETIC ULCER OF RIGHT FOOT ASSOCIATED WITH TYPE 1 DIABETES MELLITUS, WITH FAT LAYER EXPOSED, UNSPECIFIED PART OF FOOT: Primary | ICD-10-CM

## 2025-04-07 PROCEDURE — 11042 DBRDMT SUBQ TIS 1ST 20SQCM/<: CPT

## 2025-04-07 PROCEDURE — 97605 NEG PRS WND THER DME<=50SQCM: CPT

## 2025-04-07 RX ORDER — LIDOCAINE 50 MG/G
OINTMENT TOPICAL PRN
OUTPATIENT
Start: 2025-04-07

## 2025-04-07 RX ORDER — LIDOCAINE 40 MG/G
CREAM TOPICAL PRN
OUTPATIENT
Start: 2025-04-07

## 2025-04-07 RX ORDER — BACITRACIN ZINC AND POLYMYXIN B SULFATE 500; 1000 [USP'U]/G; [USP'U]/G
OINTMENT TOPICAL PRN
OUTPATIENT
Start: 2025-04-07

## 2025-04-07 RX ORDER — MUPIROCIN 20 MG/G
OINTMENT TOPICAL PRN
OUTPATIENT
Start: 2025-04-07

## 2025-04-07 RX ORDER — SODIUM CHLOR/HYPOCHLOROUS ACID 0.033 %
SOLUTION, IRRIGATION IRRIGATION PRN
OUTPATIENT
Start: 2025-04-07

## 2025-04-07 RX ORDER — TRIAMCINOLONE ACETONIDE 1 MG/G
OINTMENT TOPICAL PRN
OUTPATIENT
Start: 2025-04-07

## 2025-04-07 RX ORDER — NEOMYCIN/BACITRACIN/POLYMYXINB 3.5-400-5K
OINTMENT (GRAM) TOPICAL PRN
OUTPATIENT
Start: 2025-04-07

## 2025-04-07 RX ORDER — SILVER SULFADIAZINE 10 MG/G
CREAM TOPICAL PRN
OUTPATIENT
Start: 2025-04-07

## 2025-04-07 RX ORDER — LIDOCAINE HYDROCHLORIDE 40 MG/ML
SOLUTION TOPICAL PRN
OUTPATIENT
Start: 2025-04-07

## 2025-04-07 RX ORDER — GINSENG 100 MG
CAPSULE ORAL PRN
OUTPATIENT
Start: 2025-04-07

## 2025-04-07 RX ORDER — LIDOCAINE HYDROCHLORIDE 20 MG/ML
JELLY TOPICAL PRN
OUTPATIENT
Start: 2025-04-07

## 2025-04-07 RX ORDER — SULFAMETHOXAZOLE AND TRIMETHOPRIM 800; 160 MG/1; MG/1
1 TABLET ORAL 2 TIMES DAILY
Qty: 20 TABLET | Refills: 0 | Status: SHIPPED | OUTPATIENT
Start: 2025-04-07 | End: 2025-04-17

## 2025-04-07 RX ORDER — CLOBETASOL PROPIONATE 0.5 MG/G
OINTMENT TOPICAL PRN
OUTPATIENT
Start: 2025-04-07

## 2025-04-07 RX ORDER — BETAMETHASONE DIPROPIONATE 0.5 MG/G
CREAM TOPICAL PRN
OUTPATIENT
Start: 2025-04-07

## 2025-04-07 RX ORDER — GENTAMICIN SULFATE 1 MG/G
OINTMENT TOPICAL PRN
OUTPATIENT
Start: 2025-04-07

## 2025-04-07 NOTE — WOUND CARE
Wound Clinic Physician Orders and Discharge Instructions  [facility]  [address], [address]  [place] [address]  Telephone: [phone]     FAX [phone]    NAME:  Lex Naidu  YOB: 1945  MEDICAL RECORD NUMBER:  [id number]  DATE:  4/7/2025      Return Appointment:    [] Dressing Supply Provider:   [x] Home Healthcare:  Rena SINGH   [x] Return Appointment: 2 Week(s)   [] Nurse Visit:     [] Discharge from [facility]: [] Healed        [] Refer to Provider:         [] Consult    Follow-up Information:    [] Ordered Tests:  [] Vascular/Arterial Testing [] Imaging (X-ray, MRI, or CT)   [] Please call [phone] to schedule any testing    [x] Referrals:    [] Infectious Disease  [] Vascular  [x] Other: Dermatology     [x] Rx to pharmacy: Levaquin and Bactrim   [] Would Culture obtained in clinic:   [] Other:     Wound Cleansing:   Do not scrub or use excessive force.  Cleanse wound prior to applying a clean dressing with:    [x] Normal Saline   [] Mild Soap & Water     [] Keep Wound Dry in Shower  [] Wear a cast cover to shower  [] Other:       Topical Treatments:  Do not apply lotions, creams, or ointments to wound bed unless directed.     [] Apply moisturizing lotion to skin surrounding the wound prior to dressing change.  [] Apply antifungal ointment to skin surrounding the wound prior to dressing change.  [] Apply thin film of moisture barrier ointment (Zinc) to skin immediately around wound.  [] Apply Betadine to skin immediately around wound   [] Apply Skin Prep to skin immediately around wound  [] Other:      Dressings:           Wound Location Right Foot   [] Apply Primary Dressing:       [] MediHoney Gel [] MediHoney Alginate  [] Calcium Alginate with Silver   [] Calcium Alginate without silver   [] Collagen with silver [] Collagen without Silver    [] Santyl with moist saline gauze     [] Hydrofera Blue (cut to size and moistened with normal saline)  []

## 2025-04-07 NOTE — DISCHARGE INSTRUCTIONS
Diabetic Diet [] No Added Salt  [x] Increase Protein [] Other:     Activity:  [x] Activity as tolerated  [] Patient has no activity restrictions      [] Strict Bedrest   [] Remain off Work:       [] May return to full duty work                                    [] Return to work with restrictions:     Physician:  [] Dr. Cristal Magana  [] Dr. Mikki Thompson   [x] Dr. Clayton Alatorre  [] Tripp Manning PA-C  [] Dr. Donavon Torres  [] Dr. Singh Cardoso  [] Dr. Viktoria Cole    Nurse Case Manger:        Wound Care Center Information: Should you experience any significant changes in your wound(s) or have questions about your wound care, please contact the Wound Center at [phone]. Our hours are Monday - Friday 8am - 4:30pm, closed all major holidays. If you need help with your wound outside these hours and cannot wait until we are again available, contact your PCP or go to the hospital emergency room.     PLEASE NOTE: IF YOU ARE UNABLE TO OBTAIN WOUND SUPPLIES, CONTINUE TO USE THE SUPPLIES YOU HAVE AVAILABLE UNTIL YOU ARE ABLE TO REACH US. IT IS MOST IMPORTANT TO KEEP THE WOUND COVERED AT ALL TIMES.

## 2025-04-07 NOTE — WOUND CARE
Wound culture reviewed with Dr. Alatorre and antibiotics e-prescribed.    Per Dr. Alatorre patient has completed 2 weeks of Cipro, will only order Bactrim this time.

## 2025-04-07 NOTE — PROGRESS NOTES
[facility]   Wound Care and Hyperbaric Oxygen Therapy Center   Medical Staff Note     Lex Naidu  MEDICAL RECORD NUMBER:  [id number]  AGE: 79 y.o.   GENDER: male  : 1945  EPISODE DATE:  2025      Chief Complaint:   Chief Complaint   Patient presents with    Wound Check     R foot       History of Present Illness:     Lex Naidu is a 79 y.o. male who presents today for wound/ulcer evaluation.  He underwent a wide excision of his right lateral foot basal cell carcinoma on 2025.  With application of skin substitute using Stravix PL. here for postoperative follow-up.  He has been experiencing some increased pain in his right foot with surrounding redness and edema.  According to his wife the intensity of the erythema is more pronounced today than it was before. no fever or chills.  No active drainage.    He has been using wound VAC.     Tissue culture was positive for stenotrophomonas maltophilia.  Sensitive to levofloxacin and Bactrim    Ulcer Identification:  Ulcer Type:  Malignant ulcer, basal cell carcinoma     HEALTH FACTORS: none    Wound: Right lateral foot basal cell carcinoma    Past Medical History:       Diagnosis Date    Arthritis     Cancer (HCC)     colon,skin/colon - surgery/? unsure if he had chemotherapy (was experimental and he had a port)    Diabetes (HCC)     GERD (gastroesophageal reflux disease)     Gout     Hyperlipidemia     Hypertension     Ill-defined condition     obesity   BMI= 35.9 om 2016       Past Surgical History:   Past Surgical History:   Procedure Laterality Date    BACK SURGERY      BRAIN SURGERY      for tremors    COLONOSCOPY      multiple    COLONOSCOPY N/A 04/10/2024    COLONOSCOPY performed by Gerald Lott MD at [facility] ENDOSCOPY    COLONOSCOPY N/A 04/10/2024    COLONOSCOPY BIOPSY performed by Gerald Lott MD at [facility] ENDOSCOPY    FOOT SURGERY Right 2025    EXCISION OF RIGHT FOOT BASAL CELL CARCINOMA, APPLICATION OF

## 2025-04-07 NOTE — WOUND CARE
Negative Pressure Wound Therapy    NAME:  Lex Naidu  YOB: 1945  MEDICAL RECORD NUMBER:  [id number]  DATE:  4/7/2025    Applied Negative Pressure to Right foot wound(s)/ulcer(s).  [x] Applied skin barrier prep to brain-wound.   [x] Cut strips of plastic drape to picture frame wound so that brain-wound is     covered with the drape.   [x] If bridging dressing to less prominent site, cover any intact skin that will come in contact with the Negative Pressure Therapy sponge, gauze or channel drain with plastic drape. The sponge should never touch intact skin.   [x] Cut sponge, gauze or channel drain to size which will fit into the wound/ulcer bed without being forced.   [x] Be sure the sponge is large enough to hold the entire round plastic flange which is attached to the tubing. Never allow flange to be larger than the sponge or it will produce suction damaging intact skin.  Total number of individual pieces of foam used within the wound bed: 2    [x] If bridging the dressing away from the primary site, be sure the bridge leads to a piece of sponge large enough to hold the entire flange without allowing any of the flange to overlap onto intact skin.   [x] Covered sponge, gauze or channel drain with plastic drape.   [x] Cut a hole in this plastic drape directly over the sponge the same size as the plastic drain tubing.   [x] Removed plastic liner from flange and apply it directly over the hole you cut.   [x] Removed the plastic cover from the flange.   [x] Attached the tubing to the wound/ulcer Negative Pressure Therapy and turn it on to be sure a vacuum is created and that there are no leaks.   [x] If air leaks occur, use plastic drape to patch them.   [x] Secured Negative Pressure Therapy dressing with ace wrap loosely if located on an extremity. Maintain tubing outside of ace wrap. Tubing must not exert pressure on intact skin.    Applied per  Guidelines      Electronically signed by

## 2025-04-07 NOTE — OP NOTE
Procedure Note  Indications: Based on my examination of this patient's wound(s)/ulcer(s) today, debridement is required to promote healing and evaluate the wound base.    Debridement: Excisional Debridement    Using: curette the wound(s)/ulcer(s) was/were debrided down through and including the removal of epidermis, dermis, and subcutaneous tissue.  Performed by: Clayton Alatorre MD  Consent obtained: Yes  Time out taken: Yes  Pain Control:         Devitalized Tissue Debrided: fibrin, biofilm, and slough    Pre Debridement Measurements:  Are located in the Wound/Ulcer Documentation Flow Sheet    Diabetic/Pressure/Non Pressure Ulcers only:  Ulcer: Other: Postsurgical open wound      Wound/Ulcer #: 1  Post Debridement Measurements:  Wound/Ulcer Descriptions are Pre Debridement except measurements:  Negative Pressure Wound Therapy Foot Right (Active)   Canister changed? Yes 04/07/25 1013   Unit Type kci 04/07/25 1013   Mode Continuous 04/07/25 1013   Target Pressure (mmHg) 125 04/07/25 1013   Intensity High 04/07/25 1013   $$ Dressing Changed & Charged $ Standard NPWT less than or equal to 50 sq cm PER TX 04/07/25 1013   Number of pieces placed 2 04/07/25 1013   Dressing Type Black foam 04/07/25 1013   Number of days: 41       Wound 01/27/25 Foot Right #1 (Active)   Wound Image   04/07/25 0933   Wound Etiology Surgical 04/07/25 0933   Dressing Status Clean;Dry;Intact 04/07/25 0933   Wound Cleansed Cleansed with saline 04/07/25 0933   Dressing/Treatment Negative pressure wound therapy;Tubular bandage 04/07/25 1013   Wound Length (cm) 4.2 cm 04/07/25 0933   Wound Width (cm) 2.7 cm 04/07/25 0933   Wound Depth (cm) 0.6 cm 04/07/25 0933   Wound Surface Area (cm^2) 11.34 cm^2 04/07/25 0933   Change in Wound Size % (l*w) 28 04/07/25 0933   Wound Volume (cm^3) 6.804 cm^3 04/07/25 0933   Wound Healing % 61 04/07/25 0933   Post-Procedure Length (cm) 4.3 cm 04/07/25 0950   Post-Procedure Width (cm) 2.8 cm 04/07/25 0910

## 2025-04-21 ENCOUNTER — HOSPITAL ENCOUNTER (OUTPATIENT)
Facility: HOSPITAL | Age: 80
Discharge: HOME | End: 2025-04-21
Attending: SURGERY
Payer: MEDICARE

## 2025-04-21 VITALS
HEART RATE: 73 BPM | SYSTOLIC BLOOD PRESSURE: 126 MMHG | RESPIRATION RATE: 18 BRPM | TEMPERATURE: 98.2 F | DIASTOLIC BLOOD PRESSURE: 75 MMHG

## 2025-04-21 DIAGNOSIS — L97.512 DIABETIC ULCER OF RIGHT FOOT ASSOCIATED WITH TYPE 1 DIABETES MELLITUS, WITH FAT LAYER EXPOSED, UNSPECIFIED PART OF FOOT (HCC): Primary | ICD-10-CM

## 2025-04-21 DIAGNOSIS — E10.621 DIABETIC ULCER OF RIGHT FOOT ASSOCIATED WITH TYPE 1 DIABETES MELLITUS, WITH FAT LAYER EXPOSED, UNSPECIFIED PART OF FOOT (HCC): Primary | ICD-10-CM

## 2025-04-21 PROCEDURE — 97605 NEG PRS WND THER DME<=50SQCM: CPT

## 2025-04-21 RX ORDER — LIDOCAINE HYDROCHLORIDE 20 MG/ML
JELLY TOPICAL PRN
OUTPATIENT
Start: 2025-04-21

## 2025-04-21 RX ORDER — GENTAMICIN SULFATE 1 MG/G
OINTMENT TOPICAL PRN
OUTPATIENT
Start: 2025-04-21

## 2025-04-21 RX ORDER — SODIUM CHLOR/HYPOCHLOROUS ACID 0.033 %
SOLUTION, IRRIGATION IRRIGATION PRN
OUTPATIENT
Start: 2025-04-21

## 2025-04-21 RX ORDER — BETAMETHASONE DIPROPIONATE 0.5 MG/G
CREAM TOPICAL PRN
OUTPATIENT
Start: 2025-04-21

## 2025-04-21 RX ORDER — LIDOCAINE 50 MG/G
OINTMENT TOPICAL PRN
OUTPATIENT
Start: 2025-04-21

## 2025-04-21 RX ORDER — SILVER SULFADIAZINE 10 MG/G
CREAM TOPICAL PRN
OUTPATIENT
Start: 2025-04-21

## 2025-04-21 RX ORDER — LIDOCAINE 40 MG/G
CREAM TOPICAL PRN
OUTPATIENT
Start: 2025-04-21

## 2025-04-21 RX ORDER — NEOMYCIN/BACITRACIN/POLYMYXINB 3.5-400-5K
OINTMENT (GRAM) TOPICAL PRN
OUTPATIENT
Start: 2025-04-21

## 2025-04-21 RX ORDER — CLOBETASOL PROPIONATE 0.5 MG/G
OINTMENT TOPICAL PRN
OUTPATIENT
Start: 2025-04-21

## 2025-04-21 RX ORDER — BACITRACIN ZINC AND POLYMYXIN B SULFATE 500; 1000 [USP'U]/G; [USP'U]/G
OINTMENT TOPICAL PRN
OUTPATIENT
Start: 2025-04-21

## 2025-04-21 RX ORDER — GINSENG 100 MG
CAPSULE ORAL PRN
OUTPATIENT
Start: 2025-04-21

## 2025-04-21 RX ORDER — LIDOCAINE HYDROCHLORIDE 40 MG/ML
SOLUTION TOPICAL PRN
OUTPATIENT
Start: 2025-04-21

## 2025-04-21 RX ORDER — MUPIROCIN 20 MG/G
OINTMENT TOPICAL PRN
OUTPATIENT
Start: 2025-04-21

## 2025-04-21 RX ORDER — TRIAMCINOLONE ACETONIDE 1 MG/G
OINTMENT TOPICAL PRN
OUTPATIENT
Start: 2025-04-21

## 2025-04-21 NOTE — WOUND CARE
Wound Clinic Physician Orders and Discharge Instructions  [facility]  [address], [address]  [place] [address]  Telephone: [phone]     FAX [phone]    NAME:  Lex Naidu  YOB: 1945  MEDICAL RECORD NUMBER:  [id number]  DATE:  4/21/2025      Return Appointment:    [] Dressing Supply Provider:   [x] Home Healthcare:  Rena SINGH   [x] Return Appointment: 2 Week(s)   [] Nurse Visit:     [] Discharge from [facility]: [] Healed        [] Refer to Provider:         [] Consult    Follow-up Information:    [] Ordered Tests:  [] Vascular/Arterial Testing [] Imaging (X-ray, MRI, or CT)   [] Please call [phone] to schedule any testing    [x] Referrals:    [] Infectious Disease  [] Vascular  [x] Other: Dermatology     [] Rx to pharmacy:   [] Would Culture obtained in clinic:   [] Other:     Wound Cleansing:   Do not scrub or use excessive force.  Cleanse wound prior to applying a clean dressing with:    [x] Normal Saline   [] Mild Soap & Water     [] Keep Wound Dry in Shower  [] Wear a cast cover to shower  [] Other:       Topical Treatments:  Do not apply lotions, creams, or ointments to wound bed unless directed.     [] Apply moisturizing lotion to skin surrounding the wound prior to dressing change.  [] Apply antifungal ointment to skin surrounding the wound prior to dressing change.  [] Apply thin film of moisture barrier ointment (Zinc) to skin immediately around wound.  [] Apply Betadine to skin immediately around wound   [] Apply Skin Prep to skin immediately around wound  [] Other:      Dressings:   Begin 4/28/25        Wound Location Right Foot   [x] Apply Primary Dressing:       [] MediHoney Gel [] MediHoney Alginate  [] Calcium Alginate with Silver   [] Calcium Alginate without silver   [] Collagen with silver [] Collagen without Silver    [] Santyl with moist saline gauze     [x] Hydrofera Blue (cut to size and moistened with normal saline)  []

## 2025-04-21 NOTE — PROGRESS NOTES
[facility]   Wound Care and Hyperbaric Oxygen Therapy Center   Medical Staff Note     Lex Naidu  MEDICAL RECORD NUMBER:  [id number]  AGE: 79 y.o.   GENDER: male  : 1945  EPISODE DATE:  2025      Chief Complaint:   Chief Complaint   Patient presents with    Wound Check     R foot       History of Present Illness:     Lex Naidu is a 79 y.o. male who presents today for wound/ulcer evaluation.  He underwent a wide excision of his right lateral foot basal cell carcinoma on 2025.  With application of skin substitute using Stravix PL. here for postoperative follow-up.  He has been experiencing some increased pain in his right foot with surrounding redness and edema.  According to his wife the intensity of the erythema is more pronounced today than it was before. no fever or chills.  No active drainage.    Use wound VAC for another week and stop using it.  Instead start applying Hydrofera Blue ready.      Ulcer Identification:  Ulcer Type:  Malignant ulcer, basal cell carcinoma     HEALTH FACTORS: none    Wound: Right lateral foot basal cell carcinoma    Past Medical History:       Diagnosis Date    Arthritis     Cancer (HCC)     colon,skin/colon - surgery/? unsure if he had chemotherapy (was experimental and he had a port)    Diabetes (HCC)     GERD (gastroesophageal reflux disease)     Gout     Hyperlipidemia     Hypertension     Ill-defined condition     obesity   BMI= 35.9 om 2016       Past Surgical History:   Past Surgical History:   Procedure Laterality Date    BACK SURGERY      BRAIN SURGERY      for tremors    COLONOSCOPY      multiple    COLONOSCOPY N/A 04/10/2024    COLONOSCOPY performed by Gerald Lott MD at [facility] ENDOSCOPY    COLONOSCOPY N/A 04/10/2024    COLONOSCOPY BIOPSY performed by Gerald Lott MD at [facility] ENDOSCOPY    FOOT SURGERY Right 2025    EXCISION OF RIGHT FOOT BASAL CELL CARCINOMA, APPLICATION OF SKIN SUBSTITUTE performed by [facility]

## 2025-04-21 NOTE — DISCHARGE INSTRUCTIONS
Wound Clinic Physician Orders and Discharge Instructions  [facility]  [address], [address]  [place] [address]  Telephone: [phone]     FAX [phone]    NAME:  Lex Naidu  YOB: 1945  MEDICAL RECORD NUMBER:  [id number]  DATE:  4/21/2025      Return Appointment:    [] Dressing Supply Provider:   [x] Home Healthcare:  Rena SINGH   [x] Return Appointment: 2 Week(s)   [] Nurse Visit:     [] Discharge from [facility]: [] Healed        [] Refer to Provider:         [] Consult    Follow-up Information:    [] Ordered Tests:  [] Vascular/Arterial Testing [] Imaging (X-ray, MRI, or CT)   [] Please call [phone] to schedule any testing    [x] Referrals:    [] Infectious Disease  [] Vascular  [x] Other: Dermatology     [] Rx to pharmacy:   [] Would Culture obtained in clinic:   [] Other:     Wound Cleansing:   Do not scrub or use excessive force.  Cleanse wound prior to applying a clean dressing with:    [x] Normal Saline   [] Mild Soap & Water     [] Keep Wound Dry in Shower  [] Wear a cast cover to shower  [] Other:       Topical Treatments:  Do not apply lotions, creams, or ointments to wound bed unless directed.     [] Apply moisturizing lotion to skin surrounding the wound prior to dressing change.  [] Apply antifungal ointment to skin surrounding the wound prior to dressing change.  [] Apply thin film of moisture barrier ointment (Zinc) to skin immediately around wound.  [] Apply Betadine to skin immediately around wound   [] Apply Skin Prep to skin immediately around wound  [] Other:      Dressings:   Begin 4/28/25        Wound Location Right Foot   [x] Apply Primary Dressing:       [] MediHoney Gel [] MediHoney Alginate  [] Calcium Alginate with Silver   [] Calcium Alginate without silver   [] Collagen with silver [] Collagen without Silver    [] Santyl with moist saline gauze     [x] Hydrofera Blue (cut to size and moistened with normal saline)  []

## 2025-04-21 NOTE — WOUND CARE
Negative Pressure Wound Therapy    NAME:  Lex Naidu  YOB: 1945  MEDICAL RECORD NUMBER:  [id number]  DATE:  4/21/2025    Applied Negative Pressure to Left foot wound(s)/ulcer(s).  [x] Applied skin barrier prep to brain-wound.   [x] Cut strips of plastic drape to picture frame wound so that brain-wound is     covered with the drape.   [x] If bridging dressing to less prominent site, cover any intact skin that will come in contact with the Negative Pressure Therapy sponge, gauze or channel drain with plastic drape. The sponge should never touch intact skin.   [x] Cut sponge, gauze or channel drain to size which will fit into the wound/ulcer bed without being forced.   [x] Be sure the sponge is large enough to hold the entire round plastic flange which is attached to the tubing. Never allow flange to be larger than the sponge or it will produce suction damaging intact skin.  Total number of individual pieces of foam used within the wound bed: 2    [x] If bridging the dressing away from the primary site, be sure the bridge leads to a piece of sponge large enough to hold the entire flange without allowing any of the flange to overlap onto intact skin.   [x] Covered sponge, gauze or channel drain with plastic drape.   [x] Cut a hole in this plastic drape directly over the sponge the same size as the plastic drain tubing.   [x] Removed plastic liner from flange and apply it directly over the hole you cut.   [x] Removed the plastic cover from the flange.   [x] Attached the tubing to the wound/ulcer Negative Pressure Therapy and turn it on to be sure a vacuum is created and that there are no leaks.   [x] If air leaks occur, use plastic drape to patch them.   [x] Secured Negative Pressure Therapy dressing with ace wrap loosely if located on an extremity. Maintain tubing outside of ace wrap. Tubing must not exert pressure on intact skin.    Applied per  Guidelines      Electronically signed by

## 2025-05-05 ENCOUNTER — HOSPITAL ENCOUNTER (OUTPATIENT)
Facility: HOSPITAL | Age: 80
Discharge: HOME | End: 2025-05-05
Attending: SURGERY
Payer: MEDICARE

## 2025-05-05 VITALS
DIASTOLIC BLOOD PRESSURE: 72 MMHG | RESPIRATION RATE: 18 BRPM | TEMPERATURE: 98.4 F | SYSTOLIC BLOOD PRESSURE: 150 MMHG | HEART RATE: 71 BPM

## 2025-05-05 DIAGNOSIS — L97.512 DIABETIC ULCER OF RIGHT FOOT ASSOCIATED WITH TYPE 1 DIABETES MELLITUS, WITH FAT LAYER EXPOSED, UNSPECIFIED PART OF FOOT (HCC): Primary | ICD-10-CM

## 2025-05-05 DIAGNOSIS — E10.621 DIABETIC ULCER OF RIGHT FOOT ASSOCIATED WITH TYPE 1 DIABETES MELLITUS, WITH FAT LAYER EXPOSED, UNSPECIFIED PART OF FOOT (HCC): Primary | ICD-10-CM

## 2025-05-05 PROCEDURE — 11042 DBRDMT SUBQ TIS 1ST 20SQCM/<: CPT

## 2025-05-05 RX ORDER — LIDOCAINE HYDROCHLORIDE 20 MG/ML
JELLY TOPICAL PRN
OUTPATIENT
Start: 2025-05-05

## 2025-05-05 RX ORDER — SILVER SULFADIAZINE 10 MG/G
CREAM TOPICAL PRN
OUTPATIENT
Start: 2025-05-05

## 2025-05-05 RX ORDER — LIDOCAINE 40 MG/G
CREAM TOPICAL PRN
OUTPATIENT
Start: 2025-05-05

## 2025-05-05 RX ORDER — MUPIROCIN 20 MG/G
OINTMENT TOPICAL PRN
OUTPATIENT
Start: 2025-05-05

## 2025-05-05 RX ORDER — GENTAMICIN SULFATE 1 MG/G
OINTMENT TOPICAL PRN
OUTPATIENT
Start: 2025-05-05

## 2025-05-05 RX ORDER — SODIUM CHLOR/HYPOCHLOROUS ACID 0.033 %
SOLUTION, IRRIGATION IRRIGATION PRN
OUTPATIENT
Start: 2025-05-05

## 2025-05-05 RX ORDER — LIDOCAINE HYDROCHLORIDE 40 MG/ML
SOLUTION TOPICAL PRN
OUTPATIENT
Start: 2025-05-05

## 2025-05-05 RX ORDER — BETAMETHASONE DIPROPIONATE 0.5 MG/G
CREAM TOPICAL PRN
OUTPATIENT
Start: 2025-05-05

## 2025-05-05 RX ORDER — NEOMYCIN/BACITRACIN/POLYMYXINB 3.5-400-5K
OINTMENT (GRAM) TOPICAL PRN
OUTPATIENT
Start: 2025-05-05

## 2025-05-05 RX ORDER — TRIAMCINOLONE ACETONIDE 1 MG/G
OINTMENT TOPICAL PRN
OUTPATIENT
Start: 2025-05-05

## 2025-05-05 RX ORDER — LIDOCAINE 50 MG/G
OINTMENT TOPICAL PRN
OUTPATIENT
Start: 2025-05-05

## 2025-05-05 RX ORDER — GINSENG 100 MG
CAPSULE ORAL PRN
OUTPATIENT
Start: 2025-05-05

## 2025-05-05 RX ORDER — CLOBETASOL PROPIONATE 0.5 MG/G
OINTMENT TOPICAL PRN
OUTPATIENT
Start: 2025-05-05

## 2025-05-05 RX ORDER — BACITRACIN ZINC AND POLYMYXIN B SULFATE 500; 1000 [USP'U]/G; [USP'U]/G
OINTMENT TOPICAL PRN
OUTPATIENT
Start: 2025-05-05

## 2025-05-05 NOTE — PROGRESS NOTES
[facility]   Wound Care and [facility] Oxygen Therapy Center   Medical Staff Note     Lex Naidu  MEDICAL RECORD NUMBER:  [id number]  AGE: 79 y.o.   GENDER: male  : 1945  EPISODE DATE:  2025      Chief Complaint:   Chief Complaint   Patient presents with    Wound Check     R foot       History of Present Illness:     Lex Naidu is a 79 y.o. male who presents today for wound/ulcer evaluation.  He underwent a wide excision of his right lateral foot basal cell carcinoma on 2025.  With application of skin substitute using Stravix PL. here for postoperative follow-up.  He has been experiencing some increased pain in his right foot with surrounding redness and edema.  According to his wife the intensity of the erythema is more pronounced today than it was before. no fever or chills.  No active drainage.    Has stopped using the wound VAC.    Currently applying Hydrofera Blue ready.      Ulcer Identification:  Ulcer Type:  Malignant ulcer, basal cell carcinoma     HEALTH FACTORS: none    Wound: Right lateral foot basal cell carcinoma    Past Medical History:       Diagnosis Date    Arthritis     Cancer (HCC)     colon,skin/colon - surgery/? unsure if he had chemotherapy (was experimental and he had a port)    Diabetes (HCC)     GERD (gastroesophageal reflux disease)     Gout     Hyperlipidemia     Hypertension     Ill-defined condition     obesity   BMI= 35.9 om 2016       Past Surgical History:   Past Surgical History:   Procedure Laterality Date    BACK SURGERY      BRAIN SURGERY      for tremors    COLONOSCOPY      multiple    COLONOSCOPY N/A 04/10/2024    COLONOSCOPY performed by Gerald Lott MD at [facility] ENDOSCOPY    COLONOSCOPY N/A 04/10/2024    COLONOSCOPY BIOPSY performed by Gerald Lott MD at [facility] ENDOSCOPY    FOOT SURGERY Right 2025    EXCISION OF RIGHT FOOT BASAL CELL CARCINOMA, APPLICATION OF SKIN SUBSTITUTE performed by Clayton Alatorre MD at

## 2025-05-05 NOTE — OP NOTE
Procedure Note  Indications: Based on my examination of this patient's wound(s)/ulcer(s) today, debridement is required to promote healing and evaluate the wound base.    Debridement: Excisional Debridement    Using: curette the wound(s)/ulcer(s) was/were debrided down through and including the removal of epidermis, dermis, and subcutaneous tissue.  Performed by: Clayton Alatorre MD  Consent obtained: Yes  Time out taken: Yes  Pain Control:         Devitalized Tissue Debrided: fibrin, biofilm, and slough    Pre Debridement Measurements:  Are located in the Wound/Ulcer Documentation Flow Sheet    Diabetic/Pressure/Non Pressure Ulcers only:  Ulcer: Other: Postsurgical      Wound/Ulcer #: 1  Post Debridement Measurements:  Wound/Ulcer Descriptions are Pre Debridement except measurements:  Negative Pressure Wound Therapy Foot Right (Active)   Canister changed? Yes 04/21/25 1020   Unit Type kci 04/21/25 1020   Mode Continuous 04/21/25 1020   Target Pressure (mmHg) 125 04/21/25 1020   Intensity High 04/21/25 1020   $$ Dressing Changed & Charged $ Standard NPWT less than or equal to 50 sq cm PER TX 04/21/25 1020   Number of pieces placed 2 04/21/25 1020   Dressing Type Black foam 04/21/25 1020   Number of days: 69       Wound 01/27/25 Foot Right #1 (Active)   Wound Image   05/05/25 0925   Wound Etiology Surgical 05/05/25 0925   Dressing Status Clean;Dry;Intact 05/05/25 0925   Wound Cleansed Cleansed with saline 05/05/25 0925   Dressing/Treatment Negative pressure wound therapy;Tubular bandage 04/21/25 1020   Wound Length (cm) 2.8 cm 05/05/25 0925   Wound Width (cm) 1.9 cm 05/05/25 0925   Wound Depth (cm) 0.3 cm 05/05/25 0925   Wound Surface Area (cm^2) 5.32 cm^2 05/05/25 0925   Change in Wound Size % (l*w) 66.22 05/05/25 0925   Wound Volume (cm^3) 1.596 cm^3 05/05/25 0925   Wound Healing % 91 05/05/25 0925   Post-Procedure Length (cm) 2.9 cm 05/05/25 1007   Post-Procedure Width (cm) 2 cm 05/05/25 1007   Post-Procedure

## 2025-05-05 NOTE — WOUND CARE
Wound Clinic Physician Orders and Discharge Instructions  [facility]  [address], [address]  [place] [address]  Telephone: [phone]     FAX [phone]    NAME:  Lex Naidu  YOB: 1945  MEDICAL RECORD NUMBER:  [id number]  DATE:  5/5/2025      Return Appointment:    [] Dressing Supply Provider:   [x] Home Healthcare:  Rena SINGH   [x] Return Appointment: 2 Week(s)   [] Nurse Visit:     [] Discharge from [facility]: [] Healed        [] Refer to Provider:         [] Consult    Follow-up Information:    [] Ordered Tests:  [] Vascular/Arterial Testing [] Imaging (X-ray, MRI, or CT)   [] Please call [phone] to schedule any testing    [x] Referrals:    [] Infectious Disease  [] Vascular  [x] Other: Dermatology     [] Rx to pharmacy:   [] Would Culture obtained in clinic:   [] Other:     Wound Cleansing:   Do not scrub or use excessive force.  Cleanse wound prior to applying a clean dressing with:    [x] Normal Saline   [] Mild Soap & Water     [] Keep Wound Dry in Shower  [] Wear a cast cover to shower  [] Other:       Topical Treatments:  Do not apply lotions, creams, or ointments to wound bed unless directed.     [] Apply moisturizing lotion to skin surrounding the wound prior to dressing change.  [] Apply antifungal ointment to skin surrounding the wound prior to dressing change.  [] Apply thin film of moisture barrier ointment (Zinc) to skin immediately around wound.  [] Apply Betadine to skin immediately around wound   [] Apply Skin Prep to skin immediately around wound  [] Other:      Dressings:   Begin 4/28/25        Wound Location Right Foot   [x] Apply Primary Dressing:       [] MediHoney Gel [] MediHoney Alginate  [] Calcium Alginate with Silver   [] Calcium Alginate without silver   [x] Collagen with silver 1st [] Collagen without Silver    [] Santyl with moist saline gauze     [x] Hydrofera Blue 2nd (cut to size and moistened with normal saline)

## 2025-05-05 NOTE — DISCHARGE INSTRUCTIONS
Wound Clinic Physician Orders and Discharge Instructions  [facility]  [address], [address]  [place] [address]  Telephone: [phone]     FAX [phone]    NAME:  Lex Naidu  YOB: 1945  MEDICAL RECORD NUMBER:  [id number]  DATE:  5/5/2025      Return Appointment:    [] Dressing Supply Provider:   [x] Home Healthcare:  Rena SINGH   [x] Return Appointment: 2 Week(s)   [] Nurse Visit:     [] Discharge from [facility]: [] Healed        [] Refer to Provider:         [] Consult    Follow-up Information:    [] Ordered Tests:  [] Vascular/Arterial Testing [] Imaging (X-ray, MRI, or CT)   [] Please call [phone] to schedule any testing    [x] Referrals:    [] Infectious Disease  [] Vascular  [x] Other: Dermatology     [] Rx to pharmacy:   [] Would Culture obtained in clinic:   [] Other:     Wound Cleansing:   Do not scrub or use excessive force.  Cleanse wound prior to applying a clean dressing with:    [x] Normal Saline   [] Mild Soap & Water     [] Keep Wound Dry in Shower  [] Wear a cast cover to shower  [] Other:       Topical Treatments:  Do not apply lotions, creams, or ointments to wound bed unless directed.     [] Apply moisturizing lotion to skin surrounding the wound prior to dressing change.  [] Apply antifungal ointment to skin surrounding the wound prior to dressing change.  [] Apply thin film of moisture barrier ointment (Zinc) to skin immediately around wound.  [] Apply Betadine to skin immediately around wound   [] Apply Skin Prep to skin immediately around wound  [] Other:      Dressings:   Begin 4/28/25        Wound Location Right Foot   [x] Apply Primary Dressing:       [] MediHoney Gel [] MediHoney Alginate  [] Calcium Alginate with Silver   [] Calcium Alginate without silver   [x] Collagen with silver 1st [] Collagen without Silver    [] Santyl with moist saline gauze     [x] Hydrofera Blue 2nd (cut to size and moistened with normal saline)  []

## 2025-05-19 ENCOUNTER — HOSPITAL ENCOUNTER (OUTPATIENT)
Facility: HOSPITAL | Age: 80
Discharge: HOME | End: 2025-05-19
Attending: SURGERY
Payer: MEDICARE

## 2025-05-19 VITALS
RESPIRATION RATE: 18 BRPM | TEMPERATURE: 97.9 F | SYSTOLIC BLOOD PRESSURE: 162 MMHG | HEART RATE: 64 BPM | DIASTOLIC BLOOD PRESSURE: 77 MMHG

## 2025-05-19 DIAGNOSIS — L97.512 DIABETIC ULCER OF RIGHT FOOT ASSOCIATED WITH TYPE 1 DIABETES MELLITUS, WITH FAT LAYER EXPOSED, UNSPECIFIED PART OF FOOT (HCC): Primary | ICD-10-CM

## 2025-05-19 DIAGNOSIS — E10.621 DIABETIC ULCER OF RIGHT FOOT ASSOCIATED WITH TYPE 1 DIABETES MELLITUS, WITH FAT LAYER EXPOSED, UNSPECIFIED PART OF FOOT (HCC): Primary | ICD-10-CM

## 2025-05-19 PROCEDURE — 11042 DBRDMT SUBQ TIS 1ST 20SQCM/<: CPT

## 2025-05-19 RX ORDER — CLOBETASOL PROPIONATE 0.5 MG/G
OINTMENT TOPICAL PRN
OUTPATIENT
Start: 2025-05-19

## 2025-05-19 RX ORDER — MUPIROCIN 20 MG/G
OINTMENT TOPICAL PRN
OUTPATIENT
Start: 2025-05-19

## 2025-05-19 RX ORDER — LIDOCAINE 40 MG/G
CREAM TOPICAL PRN
OUTPATIENT
Start: 2025-05-19

## 2025-05-19 RX ORDER — GINSENG 100 MG
CAPSULE ORAL PRN
OUTPATIENT
Start: 2025-05-19

## 2025-05-19 RX ORDER — TRIAMCINOLONE ACETONIDE 1 MG/G
OINTMENT TOPICAL PRN
OUTPATIENT
Start: 2025-05-19

## 2025-05-19 RX ORDER — NEOMYCIN/BACITRACIN/POLYMYXINB 3.5-400-5K
OINTMENT (GRAM) TOPICAL PRN
OUTPATIENT
Start: 2025-05-19

## 2025-05-19 RX ORDER — LIDOCAINE HYDROCHLORIDE 20 MG/ML
JELLY TOPICAL PRN
OUTPATIENT
Start: 2025-05-19

## 2025-05-19 RX ORDER — BACITRACIN ZINC AND POLYMYXIN B SULFATE 500; 1000 [USP'U]/G; [USP'U]/G
OINTMENT TOPICAL PRN
OUTPATIENT
Start: 2025-05-19

## 2025-05-19 RX ORDER — SILVER SULFADIAZINE 10 MG/G
CREAM TOPICAL PRN
OUTPATIENT
Start: 2025-05-19

## 2025-05-19 RX ORDER — GENTAMICIN SULFATE 1 MG/G
OINTMENT TOPICAL PRN
OUTPATIENT
Start: 2025-05-19

## 2025-05-19 RX ORDER — LIDOCAINE 50 MG/G
OINTMENT TOPICAL PRN
OUTPATIENT
Start: 2025-05-19

## 2025-05-19 RX ORDER — BETAMETHASONE DIPROPIONATE 0.5 MG/G
CREAM TOPICAL PRN
OUTPATIENT
Start: 2025-05-19

## 2025-05-19 RX ORDER — SODIUM CHLOR/HYPOCHLOROUS ACID 0.033 %
SOLUTION, IRRIGATION IRRIGATION PRN
OUTPATIENT
Start: 2025-05-19

## 2025-05-19 RX ORDER — LIDOCAINE HYDROCHLORIDE 40 MG/ML
SOLUTION TOPICAL PRN
OUTPATIENT
Start: 2025-05-19

## 2025-05-19 NOTE — WOUND CARE
Wound Clinic Physician Orders and Discharge Instructions  [facility]  [address], [address]  [place] [address]  Telephone: [phone]     FAX [phone]    NAME:  Lex Naidu  YOB: 1945  MEDICAL RECORD NUMBER:  [id number]  DATE:  5/19/2025      Return Appointment:    [] Dressing Supply Provider:   [x] Home Healthcare:  Rena SINGH   [x] Return Appointment: 3 Week(s)   [] Nurse Visit:     [] Discharge from [facility]: [] Healed        [] Refer to Provider:         [] Consult    Follow-up Information:    [] Ordered Tests:  [] Vascular/Arterial Testing [] Imaging (X-ray, MRI, or CT)   [] Please call [phone] to schedule any testing    [x] Referrals:    [] Infectious Disease  [] Vascular  [x] Other: Dermatology     [] Rx to pharmacy:   [] Would Culture obtained in clinic:   [] Other:     Wound Cleansing:   Do not scrub or use excessive force.  Cleanse wound prior to applying a clean dressing with:    [x] Normal Saline   [] Mild Soap & Water     [] Keep Wound Dry in Shower  [] Wear a cast cover to shower  [] Other:       Topical Treatments:  Do not apply lotions, creams, or ointments to wound bed unless directed.     [] Apply moisturizing lotion to skin surrounding the wound prior to dressing change.  [] Apply antifungal ointment to skin surrounding the wound prior to dressing change.  [] Apply thin film of moisture barrier ointment (Zinc) to skin immediately around wound.  [] Apply Betadine to skin immediately around wound   [] Apply Skin Prep to skin immediately around wound  [] Other:      Dressings:          Wound Location Right Foot   [x] Apply Primary Dressing:       [] MediHoney Gel [] MediHoney Alginate  [] Calcium Alginate with Silver   [] Calcium Alginate without silver   [x] Collagen with silver 1st [] Collagen without Silver    [] Santyl with moist saline gauze     [x] Hydrofera Blue 2nd (cut to size and moistened with normal saline)  [] Hydrofera

## 2025-05-19 NOTE — PROGRESS NOTES
Clayton ORNELAS MD at [facility] MAIN OR    HEENT Bilateral     Lasik    MALIGNANT SKIN LESION EXCISION      unsure of type of cancer    ORTHOPEDIC SURGERY Left     shoulder reconstruction and replacement    OTHER SURGICAL HISTORY      brain surgery for tremors    PORT SURGERY      port placement - then removed    TOTAL COLECTOMY      pt think it was only a partial colon removal    UPPER GASTROINTESTINAL ENDOSCOPY      UPPER GASTROINTESTINAL ENDOSCOPY N/A 07/15/2024    ESOPHAGOGASTRODUODENOSCOPY performed by Gerald Lott MD at [facility] ENDOSCOPY    UPPER GASTROINTESTINAL ENDOSCOPY N/A 07/15/2024    ESOPHAGOGASTRODUODENOSCOPY BIOPSY performed by Gerald Lott MD at [facility] ENDOSCOPY       Allergy:No Known Allergies    Social History:   Social History     Tobacco Use    Smoking status: Former     Current packs/day: 0.00     Types: Cigarettes, Pipe     Quit date: 1965     Years since quittin.4    Smokeless tobacco: Never    Tobacco comments:     Casual smoker when smoked in the past/quit cigarettes, cigars, pipe   Vaping Use    Vaping status: Never Used   Substance Use Topics    Alcohol use: Not Currently     Comment: none in 50+ yrs    Drug use: Never       Family History:   Family History   Problem Relation Age of Onset    Lupus Mother     Hypertension Father     Diabetes Father     Stroke Father     Lupus Sister     Rheum Arthritis Other        Current Medications:  Current Outpatient Medications on File Prior to Encounter   Medication Sig Dispense Refill    ketoconazole (NIZORAL) 2 % shampoo every other day      allopurinol (ZYLOPRIM) 300 MG tablet Take 1 tablet by mouth nightly      felodipine (PLENDIL) 10 MG extended release tablet Take 1 tablet by mouth daily      fenofibrate (TRICOR) 145 MG tablet Take 1 tablet by mouth nightly      gabapentin (NEURONTIN) 300 MG capsule Take 1 capsule by mouth 2 times daily.      lovastatin (MEVACOR) 20 MG tablet Take 1 tablet by mouth nightly      metFORMIN (GLUCOPHAGE) 500 MG

## 2025-05-19 NOTE — DISCHARGE INSTRUCTIONS
Clayton Alatorre  [] Tripp Manning PA-C  [] Dr. Donavon Torres  [] Dr. Singh Cardoso  [] Dr. Viktoria Cole    Nurse Case Manger:        Wound Care Center Information: Should you experience any significant changes in your wound(s) or have questions about your wound care, please contact the Wound Center at [phone]. Our hours are Monday - Friday 8am - 4:30pm, closed all major holidays. If you need help with your wound outside these hours and cannot wait until we are again available, contact your PCP or go to the hospital emergency room.     PLEASE NOTE: IF YOU ARE UNABLE TO OBTAIN WOUND SUPPLIES, CONTINUE TO USE THE SUPPLIES YOU HAVE AVAILABLE UNTIL YOU ARE ABLE TO REACH US. IT IS MOST IMPORTANT TO KEEP THE WOUND COVERED AT ALL TIMES.

## 2025-05-19 NOTE — OP NOTE
0946   Post-Procedure Width (cm) 1.6 cm 05/19/25 0946   Post-Procedure Depth (cm) 0.3 cm 05/19/25 0946   Post-Procedure Surface Area (cm^2) 3.68 cm^2 05/19/25 0946   Post-Procedure Volume (cm^3) 1.104 cm^3 05/19/25 0946   Wound Assessment Slough;Granulation tissue;Hyper granulation tissue 05/19/25 0919   Drainage Amount Moderate (25-50%) 05/19/25 0919   Drainage Description Serosanguinous 05/19/25 0919   Odor None 05/19/25 0919   Noemi-wound Assessment Maceration;Fragile 05/19/25 0919   Margins Attached edges 05/19/25 0919   Wound Thickness Description not for Pressure Injury Full thickness 05/19/25 0919   Number of days: 112        Total Surface Area Debrided: 3.68 sq cm   Estimated Blood Loss: Minimal amount blood loss .  Hemostasis Achieved: By pressure  Procedural Pain: 0  / 10   Post Procedural Pain: 0 / 10   Response to treatment: Patient tolerated procedure well with no complaints of pain.

## 2025-06-09 ENCOUNTER — HOSPITAL ENCOUNTER (OUTPATIENT)
Facility: HOSPITAL | Age: 80
Discharge: HOME | End: 2025-06-09
Attending: SURGERY
Payer: MEDICARE

## 2025-06-09 VITALS
RESPIRATION RATE: 18 BRPM | DIASTOLIC BLOOD PRESSURE: 73 MMHG | TEMPERATURE: 97.3 F | SYSTOLIC BLOOD PRESSURE: 140 MMHG | HEART RATE: 72 BPM

## 2025-06-09 DIAGNOSIS — E10.621 DIABETIC ULCER OF RIGHT FOOT ASSOCIATED WITH TYPE 1 DIABETES MELLITUS, WITH FAT LAYER EXPOSED, UNSPECIFIED PART OF FOOT (HCC): Primary | ICD-10-CM

## 2025-06-09 DIAGNOSIS — L97.512 DIABETIC ULCER OF RIGHT FOOT ASSOCIATED WITH TYPE 1 DIABETES MELLITUS, WITH FAT LAYER EXPOSED, UNSPECIFIED PART OF FOOT (HCC): Primary | ICD-10-CM

## 2025-06-09 PROCEDURE — 11042 DBRDMT SUBQ TIS 1ST 20SQCM/<: CPT

## 2025-06-09 RX ORDER — BACITRACIN ZINC AND POLYMYXIN B SULFATE 500; 1000 [USP'U]/G; [USP'U]/G
OINTMENT TOPICAL PRN
OUTPATIENT
Start: 2025-06-09

## 2025-06-09 RX ORDER — LIDOCAINE HYDROCHLORIDE 40 MG/ML
SOLUTION TOPICAL PRN
OUTPATIENT
Start: 2025-06-09

## 2025-06-09 RX ORDER — SODIUM CHLOR/HYPOCHLOROUS ACID 0.033 %
SOLUTION, IRRIGATION IRRIGATION PRN
OUTPATIENT
Start: 2025-06-09

## 2025-06-09 RX ORDER — GENTAMICIN SULFATE 1 MG/G
OINTMENT TOPICAL PRN
OUTPATIENT
Start: 2025-06-09

## 2025-06-09 RX ORDER — CLOBETASOL PROPIONATE 0.5 MG/G
OINTMENT TOPICAL PRN
OUTPATIENT
Start: 2025-06-09

## 2025-06-09 RX ORDER — MUPIROCIN 2 %
OINTMENT (GRAM) TOPICAL PRN
OUTPATIENT
Start: 2025-06-09

## 2025-06-09 RX ORDER — GINSENG 100 MG
CAPSULE ORAL PRN
OUTPATIENT
Start: 2025-06-09

## 2025-06-09 RX ORDER — TRIAMCINOLONE ACETONIDE 1 MG/G
OINTMENT TOPICAL PRN
OUTPATIENT
Start: 2025-06-09

## 2025-06-09 RX ORDER — LIDOCAINE HYDROCHLORIDE 20 MG/ML
JELLY TOPICAL PRN
OUTPATIENT
Start: 2025-06-09

## 2025-06-09 RX ORDER — LIDOCAINE 50 MG/G
OINTMENT TOPICAL PRN
OUTPATIENT
Start: 2025-06-09

## 2025-06-09 RX ORDER — NEOMYCIN/BACITRACIN/POLYMYXINB 3.5-400-5K
OINTMENT (GRAM) TOPICAL PRN
OUTPATIENT
Start: 2025-06-09

## 2025-06-09 RX ORDER — LIDOCAINE 40 MG/G
CREAM TOPICAL PRN
OUTPATIENT
Start: 2025-06-09

## 2025-06-09 RX ORDER — BETAMETHASONE DIPROPIONATE 0.5 MG/G
CREAM TOPICAL PRN
OUTPATIENT
Start: 2025-06-09

## 2025-06-09 RX ORDER — SILVER SULFADIAZINE 10 MG/G
CREAM TOPICAL PRN
OUTPATIENT
Start: 2025-06-09

## 2025-06-09 NOTE — PROGRESS NOTES
Dr. Clayton Alatorre  [] Tripp Manning PA-C  [] Dr. Donavon Torres  [] Dr. Singh Cardoso  [] Dr. Viktoria Cole    Nurse Case Manger:        Wound Care Center Information: Should you experience any significant changes in your wound(s) or have questions about your wound care, please contact the Wound Center at [phone]. Our hours are Monday - Friday 8am - 4:30pm, closed all major holidays. If you need help with your wound outside these hours and cannot wait until we are again available, contact your PCP or go to the hospital emergency room.     PLEASE NOTE: IF YOU ARE UNABLE TO OBTAIN WOUND SUPPLIES, CONTINUE TO USE THE SUPPLIES YOU HAVE AVAILABLE UNTIL YOU ARE ABLE TO REACH US. IT IS MOST IMPORTANT TO KEEP THE WOUND COVERED AT ALL TIMES.        TIME: E/M Time spent with patient and/or patient care issues: [] 15-20 min  [] 21-30 min  [] 31-44 min  [] 45 min or more.   This is above the usual time needed to address patient's chief complaint today: [] Yes  [] No  This time includes physician non-face-to-face service time visit on the date of service such as  Preparing to see the patient (eg, review of tests)  Obtaining and/or reviewing separately obtained history  Performing a medically necessary appropriate examination and/or evaluation  Counseling and educating the patient/family/caregiver  Ordering medications, tests, or procedures  Referring and communicating with other health care professionals as needed  Documenting clinical information in the electronic or other health record  Independently interpreting results (not reported separately) and communicating results to the patient/family/caregiver  Care coordination (not reported separately)    Electronically signed by Clayton Alatorre MD on 6/9/2025 at 10:01 AM

## 2025-06-09 NOTE — OP NOTE
Procedure Note  Indications: Based on my examination of this patient's wound(s)/ulcer(s) today, debridement is required to promote healing and evaluate the wound base.    Debridement: Excisional Debridement    Using: curette the wound(s)/ulcer(s) was/were debrided down through and including the removal of epidermis, dermis, and subcutaneous tissue.  Performed by: Clayton Alatorre MD  Consent obtained: Yes  Time out taken: Yes  Pain Control:         Devitalized Tissue Debrided: fibrin, biofilm, and slough    Pre Debridement Measurements:  Are located in the Wound/Ulcer Documentation Flow Sheet    Diabetic/Pressure/Non Pressure Ulcers only:  Ulcer: Non-Pressure ulcer, fat layer exposed     Wound/Ulcer #: 1  Post Debridement Measurements:  Wound/Ulcer Descriptions are Pre Debridement except measurements:  Wound 01/27/25 Foot Right #1 (Active)   Wound Image   06/09/25 0918   Wound Etiology Other 06/09/25 0918   Dressing Status Clean;Dry;Intact 06/09/25 0918   Wound Cleansed Cleansed with saline 06/09/25 0918   Dressing/Treatment Negative pressure wound therapy;Tubular bandage 04/21/25 1020   Wound Length (cm) 1.7 cm 06/09/25 0918   Wound Width (cm) 1.1 cm 06/09/25 0918   Wound Depth (cm) 0.1 cm 06/09/25 0918   Wound Surface Area (cm^2) 1.87 cm^2 06/09/25 0918   Change in Wound Size % (l*w) 88.13 06/09/25 0918   Wound Volume (cm^3) 0.187 cm^3 06/09/25 0918   Wound Healing % 99 06/09/25 0918   Post-Procedure Length (cm) 1.8 cm 06/09/25 0947   Post-Procedure Width (cm) 1.2 cm 06/09/25 0947   Post-Procedure Depth (cm) 0.1 cm 06/09/25 0947   Post-Procedure Surface Area (cm^2) 2.16 cm^2 06/09/25 0947   Post-Procedure Volume (cm^3) 0.216 cm^3 06/09/25 0947   Wound Assessment Slough;Granulation tissue;Hyper granulation tissue 06/09/25 0918   Drainage Amount Moderate (25-50%) 06/09/25 0918   Drainage Description Serosanguinous 06/09/25 0918   Odor None 06/09/25 0918   Noemi-wound Assessment Intact 06/09/25 0918   Margins

## 2025-06-09 NOTE — DISCHARGE INSTRUCTIONS
Wound Clinic Physician Orders and Discharge Instructions  [facility]  [address], [address]  [place] [address]  Telephone: [phone]     FAX [phone]    NAME:  Lex Naidu  YOB: 1945  MEDICAL RECORD NUMBER:  [id number]  DATE:  6/9/2025      Return Appointment:    [] Dressing Supply Provider:   [x] Home Healthcare:  Rena SINGH   [x] Return Appointment: 3 Week(s)   [] Nurse Visit:     [] Discharge from [facility]: [] Healed        [] Refer to Provider:         [] Consult    Follow-up Information:    [] Ordered Tests:  [] Vascular/Arterial Testing [] Imaging (X-ray, MRI, or CT)   [] Please call [phone] to schedule any testing    [x] Referrals:    [] Infectious Disease  [] Vascular  [x] Other: Dermatology     [] Rx to pharmacy:   [] Would Culture obtained in clinic:   [] Other:     Wound Cleansing:   Do not scrub or use excessive force.  Cleanse wound prior to applying a clean dressing with:    [x] Normal Saline   [] Mild Soap & Water     [] Keep Wound Dry in Shower  [] Wear a cast cover to shower  [] Other:       Topical Treatments:  Do not apply lotions, creams, or ointments to wound bed unless directed.     [] Apply moisturizing lotion to skin surrounding the wound prior to dressing change.  [] Apply antifungal ointment to skin surrounding the wound prior to dressing change.  [] Apply thin film of moisture barrier ointment (Zinc) to skin immediately around wound.  [] Apply Betadine to skin immediately around wound   [] Apply Skin Prep to skin immediately around wound  [] Other:      Dressings:          Wound Location Right Foot   [x] Apply Primary Dressing:       [] MediHoney Gel [] MediHoney Alginate  [] Calcium Alginate with Silver   [] Calcium Alginate without silver   [x] Collagen with silver 1st [] Collagen without Silver    [] Santyl with moist saline gauze     [x] Hydrofera Blue 2nd (cut to size and moistened with normal saline)  [] Hydrofera

## 2025-06-09 NOTE — WOUND CARE
Wound Clinic Physician Orders and Discharge Instructions  [facility]  [address], [address]  [place] [address]  Telephone: [phone]     FAX [phone]    NAME:  Lex Naidu  YOB: 1945  MEDICAL RECORD NUMBER:  [id number]  DATE:  6/9/2025      Return Appointment:    [] Dressing Supply Provider:   [x] Home Healthcare:  Rena SINGH   [x] Return Appointment: 3 Week(s)   [] Nurse Visit:     [] Discharge from [facility]: [] Healed        [] Refer to Provider:         [] Consult    Follow-up Information:    [] Ordered Tests:  [] Vascular/Arterial Testing [] Imaging (X-ray, MRI, or CT)   [] Please call [phone] to schedule any testing    [x] Referrals:    [] Infectious Disease  [] Vascular  [x] Other: Dermatology     [] Rx to pharmacy:   [] Would Culture obtained in clinic:   [] Other:     Wound Cleansing:   Do not scrub or use excessive force.  Cleanse wound prior to applying a clean dressing with:    [x] Normal Saline   [] Mild Soap & Water     [] Keep Wound Dry in Shower  [] Wear a cast cover to shower  [] Other:       Topical Treatments:  Do not apply lotions, creams, or ointments to wound bed unless directed.     [] Apply moisturizing lotion to skin surrounding the wound prior to dressing change.  [] Apply antifungal ointment to skin surrounding the wound prior to dressing change.  [] Apply thin film of moisture barrier ointment (Zinc) to skin immediately around wound.  [] Apply Betadine to skin immediately around wound   [] Apply Skin Prep to skin immediately around wound  [] Other:      Dressings:          Wound Location Right Foot   [x] Apply Primary Dressing:       [] MediHoney Gel [] MediHoney Alginate  [] Calcium Alginate with Silver   [] Calcium Alginate without silver   [x] Collagen with silver 1st [] Collagen without Silver    [] Santyl with moist saline gauze     [x] Hydrofera Blue 2nd (cut to size and moistened with normal saline)  [] Hydrofera

## 2025-06-30 ENCOUNTER — HOSPITAL ENCOUNTER (OUTPATIENT)
Facility: HOSPITAL | Age: 80
Discharge: HOME | End: 2025-06-30
Attending: SURGERY
Payer: MEDICARE

## 2025-06-30 VITALS
TEMPERATURE: 97.9 F | DIASTOLIC BLOOD PRESSURE: 86 MMHG | RESPIRATION RATE: 18 BRPM | HEART RATE: 68 BPM | SYSTOLIC BLOOD PRESSURE: 162 MMHG

## 2025-06-30 DIAGNOSIS — L97.512 DIABETIC ULCER OF RIGHT FOOT ASSOCIATED WITH TYPE 1 DIABETES MELLITUS, WITH FAT LAYER EXPOSED, UNSPECIFIED PART OF FOOT (HCC): Primary | ICD-10-CM

## 2025-06-30 DIAGNOSIS — E10.621 DIABETIC ULCER OF RIGHT FOOT ASSOCIATED WITH TYPE 1 DIABETES MELLITUS, WITH FAT LAYER EXPOSED, UNSPECIFIED PART OF FOOT (HCC): Primary | ICD-10-CM

## 2025-06-30 PROCEDURE — 11042 DBRDMT SUBQ TIS 1ST 20SQCM/<: CPT

## 2025-06-30 RX ORDER — LIDOCAINE HYDROCHLORIDE 20 MG/ML
JELLY TOPICAL PRN
OUTPATIENT
Start: 2025-06-30

## 2025-06-30 RX ORDER — LIDOCAINE 50 MG/G
OINTMENT TOPICAL PRN
OUTPATIENT
Start: 2025-06-30

## 2025-06-30 RX ORDER — SODIUM CHLOR/HYPOCHLOROUS ACID 0.033 %
SOLUTION, IRRIGATION IRRIGATION PRN
OUTPATIENT
Start: 2025-06-30

## 2025-06-30 RX ORDER — NEOMYCIN/BACITRACIN/POLYMYXINB 3.5-400-5K
OINTMENT (GRAM) TOPICAL PRN
OUTPATIENT
Start: 2025-06-30

## 2025-06-30 RX ORDER — LIDOCAINE 40 MG/G
CREAM TOPICAL PRN
OUTPATIENT
Start: 2025-06-30

## 2025-06-30 RX ORDER — SILVER SULFADIAZINE 10 MG/G
CREAM TOPICAL PRN
OUTPATIENT
Start: 2025-06-30

## 2025-06-30 RX ORDER — TRIAMCINOLONE ACETONIDE 1 MG/G
OINTMENT TOPICAL PRN
OUTPATIENT
Start: 2025-06-30

## 2025-06-30 RX ORDER — BETAMETHASONE DIPROPIONATE 0.5 MG/G
CREAM TOPICAL PRN
OUTPATIENT
Start: 2025-06-30

## 2025-06-30 RX ORDER — GINSENG 100 MG
CAPSULE ORAL PRN
OUTPATIENT
Start: 2025-06-30

## 2025-06-30 RX ORDER — CLOBETASOL PROPIONATE 0.5 MG/G
OINTMENT TOPICAL PRN
OUTPATIENT
Start: 2025-06-30

## 2025-06-30 RX ORDER — LIDOCAINE HYDROCHLORIDE 40 MG/ML
SOLUTION TOPICAL PRN
OUTPATIENT
Start: 2025-06-30

## 2025-06-30 RX ORDER — GENTAMICIN SULFATE 1 MG/G
OINTMENT TOPICAL PRN
OUTPATIENT
Start: 2025-06-30

## 2025-06-30 RX ORDER — MUPIROCIN 2 %
OINTMENT (GRAM) TOPICAL PRN
OUTPATIENT
Start: 2025-06-30

## 2025-06-30 RX ORDER — BACITRACIN ZINC AND POLYMYXIN B SULFATE 500; 1000 [USP'U]/G; [USP'U]/G
OINTMENT TOPICAL PRN
OUTPATIENT
Start: 2025-06-30

## 2025-06-30 NOTE — PROGRESS NOTES
[facility]   Wound Care and Hyperbaric Oxygen Therapy Center   Medical Staff Note     Lex Naidu  MEDICAL RECORD NUMBER:  [id number]  AGE: 80 y.o.   GENDER: male  : 1945  EPISODE DATE:  2025      Chief Complaint:   Chief Complaint   Patient presents with    Wound Check     R foot       History of Present Illness:     Lex Naidu is a 80 y.o. male who presents today for wound/ulcer evaluation.  He underwent a wide excision of his right lateral foot basal cell carcinoma on 2025.  With application of skin substitute using Stravix PL. here for postoperative follow-up.  He has been experiencing some increased pain in his right foot with surrounding redness and edema.  According to his wife the intensity of the erythema is more pronounced today than it was before. no fever or chills.  No active drainage.    Currently applying Hydrofera Blue ready.    No new issues      Ulcer Identification:  Ulcer Type:  Malignant ulcer, basal cell carcinoma     HEALTH FACTORS: none    Wound: Right lateral foot basal cell carcinoma    Past Medical History:       Diagnosis Date    Arthritis     Cancer (HCC)     colon,skin/colon - surgery/? unsure if he had chemotherapy (was experimental and he had a port)    Diabetes (HCC)     GERD (gastroesophageal reflux disease)     Gout     Hyperlipidemia     Hypertension     Ill-defined condition     obesity   BMI= 35.9 om 2016       Past Surgical History:   Past Surgical History:   Procedure Laterality Date    BACK SURGERY      BRAIN SURGERY      for tremors    COLONOSCOPY      multiple    COLONOSCOPY N/A 04/10/2024    COLONOSCOPY performed by Gerald Lott MD at [facility] ENDOSCOPY    COLONOSCOPY N/A 04/10/2024    COLONOSCOPY BIOPSY performed by Gerald Lott MD at [facility] ENDOSCOPY    FOOT SURGERY Right 2025    EXCISION OF RIGHT FOOT BASAL CELL CARCINOMA, APPLICATION OF SKIN SUBSTITUTE performed by Clayton Alatorre MD at [facility] MAIN OR

## 2025-06-30 NOTE — OP NOTE
Procedure Note  Indications: Based on my examination of this patient's wound(s)/ulcer(s) today, debridement is required to promote healing and evaluate the wound base.    Debridement: Excisional Debridement    Using: curette the wound(s)/ulcer(s) was/were debrided down through and including the removal of epidermis, dermis, and subcutaneous tissue.  Performed by: Clayton Alatorre MD  Consent obtained: Yes  Time out taken: Yes  Pain Control:         Devitalized Tissue Debrided: fibrin, biofilm, and slough    Pre Debridement Measurements:  Are located in the Wound/Ulcer Documentation Flow Sheet    Diabetic/Pressure/Non Pressure Ulcers only:  Ulcer: Other: Right dorsal foot open wound     Wound/Ulcer #: 1  Post Debridement Measurements:  Wound/Ulcer Descriptions are Pre Debridement except measurements:  Wound 01/27/25 Foot Right #1 (Active)   Wound Image   06/30/25 0911   Wound Etiology Other 06/30/25 0911   Dressing Status Clean;Dry;Intact 06/30/25 0911   Wound Cleansed Cleansed with saline 06/30/25 0911   Dressing/Treatment Collagen with Ag;Hydrofera blue;Dry dressing 06/09/25 0947   Wound Length (cm) 1.4 cm 06/30/25 0911   Wound Width (cm) 0.9 cm 06/30/25 0911   Wound Depth (cm) 0.1 cm 06/30/25 0911   Wound Surface Area (cm^2) 1.26 cm^2 06/30/25 0911   Change in Wound Size % (l*w) 92 06/30/25 0911   Wound Volume (cm^3) 0.126 cm^3 06/30/25 0911   Wound Healing % 99 06/30/25 0911   Post-Procedure Length (cm) 1.5 cm 06/30/25 0951   Post-Procedure Width (cm) 1 cm 06/30/25 0951   Post-Procedure Depth (cm) 0.2 cm 06/30/25 0951   Post-Procedure Surface Area (cm^2) 1.5 cm^2 06/30/25 0951   Post-Procedure Volume (cm^3) 0.3 cm^3 06/30/25 0951   Wound Assessment Slough;Granulation tissue;Hyper granulation tissue 06/30/25 0911   Drainage Amount Small (< 25%) 06/30/25 0911   Drainage Description Yellow 06/30/25 0911   Odor None 06/30/25 0911   Noemi-wound Assessment Intact 06/30/25 0911   Margins Attached edges 06/30/25 0911

## 2025-06-30 NOTE — WOUND CARE
Wound Clinic Physician Orders and Discharge Instructions  [facility]  [address], [address]  [place] [address]  Telephone: [phone]     FAX [phone]    NAME:  Lex Naidu  YOB: 1945  MEDICAL RECORD NUMBER:  [id number]  DATE:  6/30/2025      Return Appointment:    [] Dressing Supply Provider:   [x] Home Healthcare:  Rena SINGH   [x] Return Appointment: 2 Week(s)   [] Nurse Visit:     [] Discharge from [facility]: [] Healed        [] Refer to Provider:         [] Consult    Follow-up Information:    [] Ordered Tests:  [] Vascular/Arterial Testing [] Imaging (X-ray, MRI, or CT)   [] Please call [phone] to schedule any testing    [x] Referrals:    [] Infectious Disease  [] Vascular  [x] Other: Dermatology     [] Rx to pharmacy:   [] Would Culture obtained in clinic:   [] Other:     Wound Cleansing:   Do not scrub or use excessive force.  Cleanse wound prior to applying a clean dressing with:    [x] Normal Saline or   [x] Mild Soap & Water     [] Keep Wound Dry in Shower  [] Wear a cast cover to shower  [] Other:       Topical Treatments:  Do not apply lotions, creams, or ointments to wound bed unless directed.     [] Apply moisturizing lotion to skin surrounding the wound prior to dressing change.  [] Apply antifungal ointment to skin surrounding the wound prior to dressing change.  [] Apply thin film of moisture barrier ointment (Zinc) to skin immediately around wound.  [] Apply Betadine to skin immediately around wound   [] Apply Skin Prep to skin immediately around wound  [] Other:      Dressings:          Wound Location Right Foot   [x] Apply Primary Dressing:       [] MediHoney Gel [] MediHoney Alginate  [] Calcium Alginate with Silver   [] Calcium Alginate without silver   [x] Collagen with silver 1st [] Collagen without Silver    [] Santyl with moist saline gauze     [x] Hydrofera Blue 2nd (cut to size and moistened with normal saline)  []

## 2025-06-30 NOTE — DISCHARGE INSTRUCTIONS
Wound Clinic Physician Orders and Discharge Instructions  [facility]  [address], [address]  [place] [address]  Telephone: [phone]     FAX [phone]    NAME:  Lex Naidu  YOB: 1945  MEDICAL RECORD NUMBER:  [id number]  DATE:  6/30/2025      Return Appointment:    [] Dressing Supply Provider:   [x] Home Healthcare:  Rena SINGH   [x] Return Appointment: 2 Week(s)   [] Nurse Visit:     [] Discharge from [facility]: [] Healed        [] Refer to Provider:         [] Consult    Follow-up Information:    [] Ordered Tests:  [] Vascular/Arterial Testing [] Imaging (X-ray, MRI, or CT)   [] Please call [phone] to schedule any testing    [x] Referrals:    [] Infectious Disease  [] Vascular  [x] Other: Dermatology     [] Rx to pharmacy:   [] Would Culture obtained in clinic:   [] Other:     Wound Cleansing:   Do not scrub or use excessive force.  Cleanse wound prior to applying a clean dressing with:    [x] Normal Saline or   [x] Mild Soap & Water     [] Keep Wound Dry in Shower  [] Wear a cast cover to shower  [] Other:       Topical Treatments:  Do not apply lotions, creams, or ointments to wound bed unless directed.     [] Apply moisturizing lotion to skin surrounding the wound prior to dressing change.  [] Apply antifungal ointment to skin surrounding the wound prior to dressing change.  [] Apply thin film of moisture barrier ointment (Zinc) to skin immediately around wound.  [] Apply Betadine to skin immediately around wound   [] Apply Skin Prep to skin immediately around wound  [] Other:      Dressings:          Wound Location Right Foot   [x] Apply Primary Dressing:       [] MediHoney Gel [] MediHoney Alginate  [] Calcium Alginate with Silver   [] Calcium Alginate without silver   [x] Collagen with silver 1st [] Collagen without Silver    [] Santyl with moist saline gauze     [x] Hydrofera Blue 2nd (cut to size and moistened with normal saline)  []

## 2025-07-14 ENCOUNTER — HOSPITAL ENCOUNTER (OUTPATIENT)
Facility: HOSPITAL | Age: 80
Discharge: HOME | End: 2025-07-14
Attending: SURGERY
Payer: MEDICARE

## 2025-07-14 VITALS
RESPIRATION RATE: 18 BRPM | DIASTOLIC BLOOD PRESSURE: 75 MMHG | SYSTOLIC BLOOD PRESSURE: 153 MMHG | HEART RATE: 83 BPM | TEMPERATURE: 98.2 F

## 2025-07-14 DIAGNOSIS — E10.621 DIABETIC ULCER OF RIGHT FOOT ASSOCIATED WITH TYPE 1 DIABETES MELLITUS, WITH FAT LAYER EXPOSED, UNSPECIFIED PART OF FOOT (HCC): Primary | ICD-10-CM

## 2025-07-14 DIAGNOSIS — L97.512 DIABETIC ULCER OF RIGHT FOOT ASSOCIATED WITH TYPE 1 DIABETES MELLITUS, WITH FAT LAYER EXPOSED, UNSPECIFIED PART OF FOOT (HCC): Primary | ICD-10-CM

## 2025-07-14 PROCEDURE — 99213 OFFICE O/P EST LOW 20 MIN: CPT

## 2025-07-14 RX ORDER — TRIAMCINOLONE ACETONIDE 1 MG/G
OINTMENT TOPICAL PRN
OUTPATIENT
Start: 2025-07-14

## 2025-07-14 RX ORDER — GINSENG 100 MG
CAPSULE ORAL PRN
OUTPATIENT
Start: 2025-07-14

## 2025-07-14 RX ORDER — LIDOCAINE HYDROCHLORIDE 20 MG/ML
JELLY TOPICAL PRN
OUTPATIENT
Start: 2025-07-14

## 2025-07-14 RX ORDER — LIDOCAINE 40 MG/G
CREAM TOPICAL PRN
OUTPATIENT
Start: 2025-07-14

## 2025-07-14 RX ORDER — CLOBETASOL PROPIONATE 0.5 MG/G
OINTMENT TOPICAL PRN
OUTPATIENT
Start: 2025-07-14

## 2025-07-14 RX ORDER — NEOMYCIN/BACITRACIN/POLYMYXINB 3.5-400-5K
OINTMENT (GRAM) TOPICAL PRN
OUTPATIENT
Start: 2025-07-14

## 2025-07-14 RX ORDER — SILVER SULFADIAZINE 10 MG/G
CREAM TOPICAL PRN
OUTPATIENT
Start: 2025-07-14

## 2025-07-14 RX ORDER — GENTAMICIN SULFATE 1 MG/G
OINTMENT TOPICAL PRN
OUTPATIENT
Start: 2025-07-14

## 2025-07-14 RX ORDER — MUPIROCIN 2 %
OINTMENT (GRAM) TOPICAL PRN
OUTPATIENT
Start: 2025-07-14

## 2025-07-14 RX ORDER — BACITRACIN ZINC AND POLYMYXIN B SULFATE 500; 1000 [USP'U]/G; [USP'U]/G
OINTMENT TOPICAL PRN
OUTPATIENT
Start: 2025-07-14

## 2025-07-14 RX ORDER — SODIUM CHLOR/HYPOCHLOROUS ACID 0.033 %
SOLUTION, IRRIGATION IRRIGATION PRN
OUTPATIENT
Start: 2025-07-14

## 2025-07-14 RX ORDER — FUROSEMIDE 20 MG/1
TABLET ORAL
COMMUNITY
Start: 2025-07-06

## 2025-07-14 RX ORDER — LIDOCAINE 50 MG/G
OINTMENT TOPICAL PRN
OUTPATIENT
Start: 2025-07-14

## 2025-07-14 RX ORDER — LIDOCAINE HYDROCHLORIDE 40 MG/ML
SOLUTION TOPICAL PRN
OUTPATIENT
Start: 2025-07-14

## 2025-07-14 RX ORDER — BETAMETHASONE DIPROPIONATE 0.5 MG/G
CREAM TOPICAL PRN
OUTPATIENT
Start: 2025-07-14

## 2025-07-14 RX ORDER — POTASSIUM CHLORIDE 750 MG/1
TABLET, EXTENDED RELEASE ORAL
COMMUNITY
Start: 2025-07-06

## 2025-07-14 NOTE — WOUND CARE
Wound Clinic Physician Orders and Discharge Instructions  [facility]  [address], [address]  [place] [address]  Telephone: [phone]     FAX [phone]    NAME:  Lex Naidu  YOB: 1945  MEDICAL RECORD NUMBER:  [id number]  DATE:  7/14/2025      Return Appointment:    [] Dressing Supply Provider:   [x] Home Healthcare:  Rena SINGH   [x] Return Appointment: 3 Week(s)   [] Nurse Visit:     [] Discharge from [facility]: [] Healed        [] Refer to Provider:         [] Consult    Follow-up Information:    [] Ordered Tests:  [] Vascular/Arterial Testing [] Imaging (X-ray, MRI, or CT)   [] Please call [phone] to schedule any testing    [x] Referrals:    [] Infectious Disease  [] Vascular  [x] Other: Dermatology     [] Rx to pharmacy:   [] Would Culture obtained in clinic:   [] Other:     Wound Cleansing:   Do not scrub or use excessive force.  Cleanse wound prior to applying a clean dressing with:    [x] Normal Saline or   [x] Mild Soap & Water     [] Keep Wound Dry in Shower  [] Wear a cast cover to shower  [] Other:       Topical Treatments:  Do not apply lotions, creams, or ointments to wound bed unless directed.     [] Apply moisturizing lotion to skin surrounding the wound prior to dressing change.  [] Apply antifungal ointment to skin surrounding the wound prior to dressing change.  [] Apply thin film of moisture barrier ointment (Zinc) to skin immediately around wound.  [] Apply Betadine to skin immediately around wound   [] Apply Skin Prep to skin immediately around wound  [] Other:      Dressings:          Wound Location Right Foot   [x] Apply Primary Dressing:       [] MediHoney Gel [] MediHoney Alginate  [] Calcium Alginate with Silver   [] Calcium Alginate without silver   [x] Collagen with silver 1st [] Collagen without Silver    [] Santyl with moist saline gauze     [x] Hydrofera Blue 2nd (cut to size and moistened with normal saline)  []

## 2025-07-14 NOTE — PROGRESS NOTES
[facility]   Wound Care and Hyperbaric Oxygen Therapy Center   Medical Staff Note     Lex Naidu  MEDICAL RECORD NUMBER:  [id number]  AGE: 80 y.o.   GENDER: male  : 1945  EPISODE DATE:  2025      Chief Complaint:   Chief Complaint   Patient presents with    Wound Check     Right foot       History of Present Illness:     Lex Naidu is a 80 y.o. male who presents today for wound/ulcer evaluation.  He underwent a wide excision of his right lateral foot basal cell carcinoma on 2025.  With application of skin substitute using Stravix PL. here for postoperative follow-up.  He has been experiencing some increased pain in his right foot with surrounding redness and edema.  According to his wife the intensity of the erythema is more pronounced today than it was before. no fever or chills.  No active drainage.    Currently applying Hydrofera Blue ready.    No new issues today.      Ulcer Identification:  Ulcer Type:  Malignant ulcer, basal cell carcinoma     HEALTH FACTORS: none    Wound: Right lateral foot basal cell carcinoma    Past Medical History:       Diagnosis Date    Arthritis     Cancer (HCC)     colon,skin/colon - surgery/? unsure if he had chemotherapy (was experimental and he had a port)    Diabetes (HCC)     GERD (gastroesophageal reflux disease)     Gout     Hyperlipidemia     Hypertension     Ill-defined condition     obesity   BMI= 35.9 om 2016       Past Surgical History:   Past Surgical History:   Procedure Laterality Date    BACK SURGERY      BRAIN SURGERY      for tremors    COLONOSCOPY      multiple    COLONOSCOPY N/A 04/10/2024    COLONOSCOPY performed by Gerald Lott MD at [facility] ENDOSCOPY    COLONOSCOPY N/A 04/10/2024    COLONOSCOPY BIOPSY performed by Gerald Lott MD at [facility] ENDOSCOPY    FOOT SURGERY Right 2025    EXCISION OF RIGHT FOOT BASAL CELL CARCINOMA, APPLICATION OF SKIN SUBSTITUTE performed by Clayton Alatorre MD at [facility]

## 2025-07-14 NOTE — DISCHARGE INSTRUCTIONS
Wound Clinic Physician Orders and Discharge Instructions  [facility]  [address], [address]  [place] [address]  Telephone: [phone]     FAX [phone]    NAME:  Lex Naidu  YOB: 1945  MEDICAL RECORD NUMBER:  [id number]  DATE:  7/14/2025      Return Appointment:    [] Dressing Supply Provider:   [x] Home Healthcare:  Rena SINGH   [x] Return Appointment: 3 Week(s)   [] Nurse Visit:     [] Discharge from [facility]: [] Healed        [] Refer to Provider:         [] Consult    Follow-up Information:    [] Ordered Tests:  [] Vascular/Arterial Testing [] Imaging (X-ray, MRI, or CT)   [] Please call [phone] to schedule any testing    [x] Referrals:    [] Infectious Disease  [] Vascular  [x] Other: Dermatology     [] Rx to pharmacy:   [] Would Culture obtained in clinic:   [] Other:     Wound Cleansing:   Do not scrub or use excessive force.  Cleanse wound prior to applying a clean dressing with:    [x] Normal Saline or   [x] Mild Soap & Water     [] Keep Wound Dry in Shower  [] Wear a cast cover to shower  [] Other:       Topical Treatments:  Do not apply lotions, creams, or ointments to wound bed unless directed.     [] Apply moisturizing lotion to skin surrounding the wound prior to dressing change.  [] Apply antifungal ointment to skin surrounding the wound prior to dressing change.  [] Apply thin film of moisture barrier ointment (Zinc) to skin immediately around wound.  [] Apply Betadine to skin immediately around wound   [] Apply Skin Prep to skin immediately around wound  [] Other:      Dressings:          Wound Location Right Foot   [x] Apply Primary Dressing:       [] MediHoney Gel [] MediHoney Alginate  [] Calcium Alginate with Silver   [] Calcium Alginate without silver   [x] Collagen with silver 1st [] Collagen without Silver    [] Santyl with moist saline gauze     [x] Hydrofera Blue 2nd (cut to size and moistened with normal saline)  []

## 2025-08-04 ENCOUNTER — HOSPITAL ENCOUNTER (OUTPATIENT)
Facility: HOSPITAL | Age: 80
Discharge: HOME | End: 2025-08-04
Attending: SURGERY
Payer: MEDICARE

## 2025-08-04 VITALS
TEMPERATURE: 97.5 F | HEART RATE: 73 BPM | SYSTOLIC BLOOD PRESSURE: 145 MMHG | DIASTOLIC BLOOD PRESSURE: 75 MMHG | RESPIRATION RATE: 18 BRPM

## 2025-08-04 DIAGNOSIS — L97.512 DIABETIC ULCER OF RIGHT FOOT ASSOCIATED WITH TYPE 1 DIABETES MELLITUS, WITH FAT LAYER EXPOSED, UNSPECIFIED PART OF FOOT (HCC): Primary | ICD-10-CM

## 2025-08-04 DIAGNOSIS — E10.621 DIABETIC ULCER OF RIGHT FOOT ASSOCIATED WITH TYPE 1 DIABETES MELLITUS, WITH FAT LAYER EXPOSED, UNSPECIFIED PART OF FOOT (HCC): Primary | ICD-10-CM

## 2025-08-04 PROCEDURE — 99212 OFFICE O/P EST SF 10 MIN: CPT

## 2025-08-04 RX ORDER — MUPIROCIN 2 %
OINTMENT (GRAM) TOPICAL PRN
Status: CANCELLED | OUTPATIENT
Start: 2025-08-04

## 2025-08-04 RX ORDER — GENTAMICIN SULFATE 1 MG/G
OINTMENT TOPICAL PRN
Status: CANCELLED | OUTPATIENT
Start: 2025-08-04

## 2025-08-04 RX ORDER — BACITRACIN ZINC AND POLYMYXIN B SULFATE 500; 1000 [USP'U]/G; [USP'U]/G
OINTMENT TOPICAL PRN
Status: CANCELLED | OUTPATIENT
Start: 2025-08-04

## 2025-08-04 RX ORDER — CLOBETASOL PROPIONATE 0.5 MG/G
OINTMENT TOPICAL PRN
Status: CANCELLED | OUTPATIENT
Start: 2025-08-04

## 2025-08-04 RX ORDER — LIDOCAINE HYDROCHLORIDE 20 MG/ML
JELLY TOPICAL PRN
Status: CANCELLED | OUTPATIENT
Start: 2025-08-04

## 2025-08-04 RX ORDER — NEOMYCIN/BACITRACIN/POLYMYXINB 3.5-400-5K
OINTMENT (GRAM) TOPICAL PRN
Status: CANCELLED | OUTPATIENT
Start: 2025-08-04

## 2025-08-04 RX ORDER — CARBOXYMETHYLCELLULOSE SODIUM 5 MG/ML
1 SOLUTION/ DROPS OPHTHALMIC 3 TIMES DAILY
COMMUNITY

## 2025-08-04 RX ORDER — LIDOCAINE 40 MG/G
CREAM TOPICAL PRN
Status: CANCELLED | OUTPATIENT
Start: 2025-08-04

## 2025-08-04 RX ORDER — BETAMETHASONE DIPROPIONATE 0.5 MG/G
CREAM TOPICAL PRN
Status: CANCELLED | OUTPATIENT
Start: 2025-08-04

## 2025-08-04 RX ORDER — GINSENG 100 MG
CAPSULE ORAL PRN
Status: CANCELLED | OUTPATIENT
Start: 2025-08-04

## 2025-08-04 RX ORDER — LIDOCAINE 50 MG/G
OINTMENT TOPICAL PRN
Status: CANCELLED | OUTPATIENT
Start: 2025-08-04

## 2025-08-04 RX ORDER — TRIAMCINOLONE ACETONIDE 1 MG/G
OINTMENT TOPICAL PRN
Status: CANCELLED | OUTPATIENT
Start: 2025-08-04

## 2025-08-04 RX ORDER — LIDOCAINE HYDROCHLORIDE 40 MG/ML
SOLUTION TOPICAL PRN
Status: CANCELLED | OUTPATIENT
Start: 2025-08-04

## 2025-08-04 RX ORDER — SODIUM CHLOR/HYPOCHLOROUS ACID 0.033 %
SOLUTION, IRRIGATION IRRIGATION PRN
Status: CANCELLED | OUTPATIENT
Start: 2025-08-04

## 2025-08-04 RX ORDER — SILVER SULFADIAZINE 10 MG/G
CREAM TOPICAL PRN
Status: CANCELLED | OUTPATIENT
Start: 2025-08-04

## (undated) DEVICE — SUT VCRL + 1 27IN OS6 VIO --

## (undated) DEVICE — 3M™ STERI-STRIP™ REINFORCED ADHESIVE SKIN CLOSURES, R1547, 1/2 IN X 4 IN (12 MM X 100 MM), 6 STRIPS/ENVELOPE: Brand: 3M™ STERI-STRIP™

## (undated) DEVICE — BLUNTFILL: Brand: MONOJECT

## (undated) DEVICE — SUT MCRYL + 3-0 27IN PS1 UD --

## (undated) DEVICE — SUTURE VCRL + SZ 2-0 L27IN ABSRB UD CP-1 1/2 CIR REV CUT VCP266H

## (undated) DEVICE — TIBURON UNIVERSAL SPINE DRAPE: Brand: CONVERTORS

## (undated) DEVICE — STERILE POLYISOPRENE POWDER-FREE SURGICAL GLOVES: Brand: PROTEXIS

## (undated) DEVICE — SOLUTION IRRIG 500ML 0.9% SOD CHLO USP POUR PLAS BTL

## (undated) DEVICE — SYRINGE IRRIG 60ML SFT PLIABLE BLB EZ TO GRP 1 HND USE W/

## (undated) DEVICE — TURNOVER KIT A GEN SURG

## (undated) DEVICE — BLUNTFILL WITH FILTER: Brand: MONOJECT

## (undated) DEVICE — SUT PROL 6-0 30IN C1 DA BLU --

## (undated) DEVICE — DRAPE C-ARMOUR C-ARM KIT --

## (undated) DEVICE — 3M™ IOBAN™ 2 ANTIMICROBIAL INCISE DRAPE 6650EZ: Brand: IOBAN™ 2

## (undated) DEVICE — CATHETER IV 22GA L1IN OD0.8382-0.9144MM ID0.6096-0.6858MM 382523

## (undated) DEVICE — DRESSING GZ W3XL16IN CELOS ACETT OIL EMUL N ADH

## (undated) DEVICE — MINOR GENERAL: Brand: MEDLINE INDUSTRIES, INC.

## (undated) DEVICE — GAUZE,SPONGE,4"X4",16PLY,STRL,LF,10/TRAY: Brand: MEDLINE

## (undated) DEVICE — 1200 GUARD II KIT W/5MM TUBE W/O VAC TUBE: Brand: GUARDIAN

## (undated) DEVICE — DRAPE, HEAVY DUTY, STERILE. FOR A 6' BIG CASE BACK TABLE MODEL 429: Brand: OR SPECIFIC

## (undated) DEVICE — MARKER SURG SKIN UTIL REGULAR/FINE 2 TIP W/ RUL AND 9 LBL

## (undated) DEVICE — DRAPE EQUIP C ARM 74X42 IN MOB XR W/ TIE RUBBER BND LF

## (undated) DEVICE — SOLIDIFIER FLD 2OZ 1500CC N DISINF IN BTL DISP SAFESORB

## (undated) DEVICE — INSERT CUSH HDRST PRONE AD LG --

## (undated) DEVICE — DRAPE,REIN 53X77,STERILE: Brand: MEDLINE

## (undated) DEVICE — BLADE ULTRASONIC L20MM BLNT W/ SIL SL SHT EXTN DISP FOR HRD

## (undated) DEVICE — SOL INJ SOD CL 0.9% 1000ML BG --

## (undated) DEVICE — HEX-LOCKING BLADE ELECTRODE: Brand: EDGE

## (undated) DEVICE — IV STRT KT 3282] LSL INDUSTRIES INC]

## (undated) DEVICE — SUTURE CHROMIC GUT SZ 3-0 L27IN ABSRB BRN L26MM SH 1/2 CIR G122H

## (undated) DEVICE — GLOVE SURG SZ 7 L12IN FNGR THK79MIL GRN LTX FREE

## (undated) DEVICE — HEAD FRAME WITH SOFT LAYER FOAM POSITIONER: Brand: CARDINAL HEALTH

## (undated) DEVICE — BITEBLOCK ENDOSCP 60FR MAXI WHT POLYETH STURDY W/ VELC WVN

## (undated) DEVICE — CORD BPLR 12FT SGL USE CLR

## (undated) DEVICE — BLADE,CARBON-STEEL,15,STRL,DISPOSABLE,TB: Brand: MEDLINE

## (undated) DEVICE — SET IRRIG TB DISP FOR BONESCALPEL

## (undated) DEVICE — MICROMYST APPLICATOR (14CM) 5 PACK - FOR USE WITH FLOW REGULATOR: Brand: MICROMYST

## (undated) DEVICE — TURNOVER KIT OR CUST CMB FLD GEN LF

## (undated) DEVICE — SET ADMIN 16ML TBNG L100IN 2 Y INJ SITE IV PIGGY BK DISP (ORDER IN MULIPLES OF 48)

## (undated) DEVICE — CANNULA CUSH AD W/ 14FT TBG

## (undated) DEVICE — SUTURE PERMA-HAND SZ 2-0 L30IN NONABSORBABLE BLK L26MM SH K833H

## (undated) DEVICE — DRAPE SURG TOWEL SM 18X12 IN INVISISHIELD MP W/ ADH PLAS NS

## (undated) DEVICE — TRAY URIN CATH PED 16FR BLLN 5CC INDWL STR TIP INF CTRL

## (undated) DEVICE — INTENDED FOR TISSUE SEPARATION, AND OTHER PROCEDURES THAT REQUIRE A SHARP SURGICAL BLADE TO PUNCTURE OR CUT.: Brand: BARD-PARKER ® CARBON RIB-BACK BLADES

## (undated) DEVICE — KIT COLON W/ 1.1OZ LUB AND 2 END

## (undated) DEVICE — REM POLYHESIVE ADULT PATIENT RETURN ELECTRODE: Brand: VALLEYLAB

## (undated) DEVICE — SOL IRR SOD CL 0.9% 1000ML BTL --

## (undated) DEVICE — DURASEAL® DURAL SEALANT SYSTEM 5ML 5 PACK: Brand: DURASEAL®

## (undated) DEVICE — COVER LT HNDL BLU PLAS

## (undated) DEVICE — SYRINGE MED 5ML STD CLR PLAS LUERLOCK TIP N CTRL DISP

## (undated) DEVICE — MAGNETIC INSTRUMENT PAD 16" X 20"; LARGE; DISPOSABLE: Brand: CARDINAL HEALTH

## (undated) DEVICE — PREP SKN DURAPREP 26ML APPL --

## (undated) DEVICE — GARMENT CMPR STD UNV CALF FLWT -- RN VENTILATE NS DISP 17- IN

## (undated) DEVICE — 3M™ COBAN™ NL STERILE NON-LATEX SELF-ADHERENT WRAP, 2084S, 4 IN X 5 YD (10 CM X 4,5 M), 18 ROLLS/CASE: Brand: 3M™ COBAN™

## (undated) DEVICE — INTENDED FOR TISSUE SEPARATION, AND OTHER PROCEDURES THAT REQUIRE A SHARP SURGICAL BLADE TO PUNCTURE OR CUT.: Brand: BARD-PARKER SAFETY BLADES SIZE 10, STERILE

## (undated) DEVICE — BLADE ELECTRODE: Brand: EDGE

## (undated) DEVICE — GLOVE ORANGE PI 7   MSG9070

## (undated) DEVICE — PREP PAD BNS: Brand: CONVERTORS

## (undated) DEVICE — SEALANT HEMOSTAT W/THROM 8ML -- SURGIFLO MATRIX

## (undated) DEVICE — SYRINGE MEDICAL 3ML CLEAR PLASTIC STANDARD NON CONTROL LUERLOCK TIP DISPOSABLE

## (undated) DEVICE — SPONGE LAP PREWASHED 4X18 IN X RAY DETECTABLE SURG COUNT

## (undated) DEVICE — ELECTRODE,RADIOTRANSLUCENT,FOAM,3PK: Brand: MEDLINE

## (undated) DEVICE — BASIC SINGLE BASIN-LF: Brand: MEDLINE INDUSTRIES, INC.

## (undated) DEVICE — CHS NEURO PLUS 1: Brand: MEDLINE INDUSTRIES, INC.

## (undated) DEVICE — CONTAINER,SPECIMEN,PNEU TUBE,4OZ,OR STRL: Brand: MEDLINE

## (undated) DEVICE — NEURO SPONGES: Brand: DEROYAL

## (undated) DEVICE — TIP SUCTION FLANGE YANKAUER FLX NO VENT FN CAP STRL

## (undated) DEVICE — INTENDED FOR TISSUE SEPARATION, AND OTHER PROCEDURES THAT REQUIRE A SHARP SURGICAL BLADE TO PUNCTURE OR CUT.: Brand: BARD-PARKER SAFETY BLADES SIZE 15, STERILE

## (undated) DEVICE — GOWN,PREVENTION PLUS,XLN/XL,ST,24/CS: Brand: MEDLINE